# Patient Record
Sex: MALE | Race: ASIAN | NOT HISPANIC OR LATINO | URBAN - METROPOLITAN AREA
[De-identification: names, ages, dates, MRNs, and addresses within clinical notes are randomized per-mention and may not be internally consistent; named-entity substitution may affect disease eponyms.]

---

## 2018-11-01 ENCOUNTER — OUTPATIENT (OUTPATIENT)
Dept: OUTPATIENT SERVICES | Facility: HOSPITAL | Age: 58
LOS: 1 days | End: 2018-11-01
Payer: MEDICAID

## 2018-11-01 PROCEDURE — G9001: CPT

## 2018-11-04 ENCOUNTER — INPATIENT (INPATIENT)
Facility: HOSPITAL | Age: 58
LOS: 1 days | Discharge: HOME | End: 2018-11-06
Attending: INTERNAL MEDICINE | Admitting: INTERNAL MEDICINE
Payer: MEDICAID

## 2018-11-04 VITALS
SYSTOLIC BLOOD PRESSURE: 158 MMHG | DIASTOLIC BLOOD PRESSURE: 63 MMHG | RESPIRATION RATE: 16 BRPM | HEART RATE: 96 BPM | OXYGEN SATURATION: 100 % | TEMPERATURE: 98 F

## 2018-11-04 LAB
ALBUMIN SERPL ELPH-MCNC: 3.8 G/DL — SIGNIFICANT CHANGE UP (ref 3.3–5)
ALP SERPL-CCNC: 128 U/L — HIGH (ref 40–120)
ALT FLD-CCNC: 15 U/L — SIGNIFICANT CHANGE UP (ref 4–41)
APTT BLD: 30.6 SEC — SIGNIFICANT CHANGE UP (ref 27.5–36.3)
AST SERPL-CCNC: 21 U/L — SIGNIFICANT CHANGE UP (ref 4–40)
BASE EXCESS BLDV CALC-SCNC: 0.9 MMOL/L — SIGNIFICANT CHANGE UP
BASOPHILS # BLD AUTO: 0.03 K/UL — SIGNIFICANT CHANGE UP (ref 0–0.2)
BASOPHILS NFR BLD AUTO: 0.3 % — SIGNIFICANT CHANGE UP (ref 0–2)
BILIRUB SERPL-MCNC: 0.6 MG/DL — SIGNIFICANT CHANGE UP (ref 0.2–1.2)
BLOOD GAS VENOUS - CREATININE: 0.65 MG/DL — SIGNIFICANT CHANGE UP (ref 0.5–1.3)
BUN SERPL-MCNC: 19 MG/DL — SIGNIFICANT CHANGE UP (ref 7–23)
CALCIUM SERPL-MCNC: 10.1 MG/DL — SIGNIFICANT CHANGE UP (ref 8.4–10.5)
CHLORIDE BLDV-SCNC: 105 MMOL/L — SIGNIFICANT CHANGE UP (ref 96–108)
CHLORIDE SERPL-SCNC: 101 MMOL/L — SIGNIFICANT CHANGE UP (ref 98–107)
CO2 SERPL-SCNC: 22 MMOL/L — SIGNIFICANT CHANGE UP (ref 22–31)
CREAT SERPL-MCNC: 0.73 MG/DL — SIGNIFICANT CHANGE UP (ref 0.5–1.3)
EOSINOPHIL # BLD AUTO: 0.29 K/UL — SIGNIFICANT CHANGE UP (ref 0–0.5)
EOSINOPHIL NFR BLD AUTO: 2.9 % — SIGNIFICANT CHANGE UP (ref 0–6)
GAS PNL BLDV: 135 MMOL/L — LOW (ref 136–146)
GLUCOSE BLDV-MCNC: 108 — HIGH (ref 70–99)
GLUCOSE SERPL-MCNC: 110 MG/DL — HIGH (ref 70–99)
HCO3 BLDV-SCNC: 24 MMOL/L — SIGNIFICANT CHANGE UP (ref 20–27)
HCT VFR BLD CALC: 37.4 % — LOW (ref 39–50)
HCT VFR BLDV CALC: 36.8 % — LOW (ref 39–51)
HGB BLD-MCNC: 11.9 G/DL — LOW (ref 13–17)
HGB BLDV-MCNC: 12 G/DL — LOW (ref 13–17)
IMM GRANULOCYTES # BLD AUTO: 0.04 # — SIGNIFICANT CHANGE UP
IMM GRANULOCYTES NFR BLD AUTO: 0.4 % — SIGNIFICANT CHANGE UP (ref 0–1.5)
INR BLD: 1.42 — HIGH (ref 0.88–1.17)
LACTATE BLDV-MCNC: 2.6 MMOL/L — HIGH (ref 0.5–2)
LYMPHOCYTES # BLD AUTO: 2.04 K/UL — SIGNIFICANT CHANGE UP (ref 1–3.3)
LYMPHOCYTES # BLD AUTO: 20.2 % — SIGNIFICANT CHANGE UP (ref 13–44)
MCHC RBC-ENTMCNC: 27.3 PG — SIGNIFICANT CHANGE UP (ref 27–34)
MCHC RBC-ENTMCNC: 31.8 % — LOW (ref 32–36)
MCV RBC AUTO: 85.8 FL — SIGNIFICANT CHANGE UP (ref 80–100)
MONOCYTES # BLD AUTO: 0.83 K/UL — SIGNIFICANT CHANGE UP (ref 0–0.9)
MONOCYTES NFR BLD AUTO: 8.2 % — SIGNIFICANT CHANGE UP (ref 2–14)
NEUTROPHILS # BLD AUTO: 6.88 K/UL — SIGNIFICANT CHANGE UP (ref 1.8–7.4)
NEUTROPHILS NFR BLD AUTO: 68 % — SIGNIFICANT CHANGE UP (ref 43–77)
NRBC # FLD: 0 — SIGNIFICANT CHANGE UP
PCO2 BLDV: 39 MMHG — LOW (ref 41–51)
PH BLDV: 7.42 PH — SIGNIFICANT CHANGE UP (ref 7.32–7.43)
PLATELET # BLD AUTO: 189 K/UL — SIGNIFICANT CHANGE UP (ref 150–400)
PMV BLD: 10.1 FL — SIGNIFICANT CHANGE UP (ref 7–13)
PO2 BLDV: 22 MMHG — LOW (ref 35–40)
POTASSIUM BLDV-SCNC: 3.8 MMOL/L — SIGNIFICANT CHANGE UP (ref 3.4–4.5)
POTASSIUM SERPL-MCNC: 4.1 MMOL/L — SIGNIFICANT CHANGE UP (ref 3.5–5.3)
POTASSIUM SERPL-SCNC: 4.1 MMOL/L — SIGNIFICANT CHANGE UP (ref 3.5–5.3)
PROT SERPL-MCNC: 8.6 G/DL — HIGH (ref 6–8.3)
PROTHROM AB SERPL-ACNC: 15.9 SEC — HIGH (ref 9.8–13.1)
RBC # BLD: 4.36 M/UL — SIGNIFICANT CHANGE UP (ref 4.2–5.8)
RBC # FLD: 13.5 % — SIGNIFICANT CHANGE UP (ref 10.3–14.5)
SAO2 % BLDV: 29.4 % — LOW (ref 60–85)
SODIUM SERPL-SCNC: 137 MMOL/L — SIGNIFICANT CHANGE UP (ref 135–145)
TROPONIN T, HIGH SENSITIVITY: 7 NG/L — SIGNIFICANT CHANGE UP (ref ?–14)
TROPONIN T, HIGH SENSITIVITY: 8 NG/L — SIGNIFICANT CHANGE UP (ref ?–14)
TSH SERPL-MCNC: < 0.1 UIU/ML — LOW (ref 0.27–4.2)
WBC # BLD: 10.11 K/UL — SIGNIFICANT CHANGE UP (ref 3.8–10.5)
WBC # FLD AUTO: 10.11 K/UL — SIGNIFICANT CHANGE UP (ref 3.8–10.5)

## 2018-11-04 PROCEDURE — 70486 CT MAXILLOFACIAL W/O DYE: CPT | Mod: 26

## 2018-11-04 PROCEDURE — 70450 CT HEAD/BRAIN W/O DYE: CPT | Mod: 26

## 2018-11-04 PROCEDURE — 71046 X-RAY EXAM CHEST 2 VIEWS: CPT | Mod: 26

## 2018-11-04 PROCEDURE — 72170 X-RAY EXAM OF PELVIS: CPT | Mod: 26

## 2018-11-04 RX ORDER — KETOROLAC TROMETHAMINE 30 MG/ML
15 SYRINGE (ML) INJECTION ONCE
Qty: 0 | Refills: 0 | Status: DISCONTINUED | OUTPATIENT
Start: 2018-11-04 | End: 2018-11-04

## 2018-11-04 RX ORDER — SODIUM CHLORIDE 9 MG/ML
1000 INJECTION INTRAMUSCULAR; INTRAVENOUS; SUBCUTANEOUS ONCE
Qty: 0 | Refills: 0 | Status: COMPLETED | OUTPATIENT
Start: 2018-11-04 | End: 2018-11-04

## 2018-11-04 RX ADMIN — Medication 15 MILLIGRAM(S): at 23:24

## 2018-11-04 RX ADMIN — SODIUM CHLORIDE 1000 MILLILITER(S): 9 INJECTION INTRAMUSCULAR; INTRAVENOUS; SUBCUTANEOUS at 23:24

## 2018-11-04 RX ADMIN — Medication 15 MILLIGRAM(S): at 23:45

## 2018-11-04 NOTE — ED PROVIDER NOTE - OBJECTIVE STATEMENT
[age] yo M from [place] arrived with mechanical fall after tripping on curb.  Pt noted to hit face on ground with ? LOC according to daughter who witnessed the fall.  Patient was able to get up noted to sustain laceration on the lip and abrasion on the nose.  Pt noted to has short term memory loss, with repetitive questioning of the same questions.  Denies nausea, vomiting, chest pain, abdominal pain, knee or leg pain.    Of note patient has been having increasing weakness and lethargy over the past few weeks.  Was brought here by children for medical eval.  Has undergone multiple evaluations by neurology as eval for possible Parkinson's which has been ruled out.  Pt also noted to have ?anemia of unknown etiology.

## 2018-11-04 NOTE — ED ADULT NURSE NOTE - CHIEF COMPLAINT QUOTE
pt here for a mechanical fall and change in mental status. daughter was with pt when he fell. states fall was mechanical, questionable loc, not on any a/c. pt does not remember the fall, has short term memory loss, pt A&O2-3 but repeating the same phrases over and over in triage. has abrasion to nose and small lac to forehead. fs 106

## 2018-11-04 NOTE — ED ADULT TRIAGE NOTE - CHIEF COMPLAINT QUOTE
pt here for a mechanical fall and change in mental status. daughter was with pt when he fell. states fall was mechanical, questionable loc, not on any a/c. pt does not remember the fall, has short term memory loss, pt A&O2-3 but repeating the same phrases over and over in triage. fs 106 pt here for a mechanical fall and change in mental status. daughter was with pt when he fell. states fall was mechanical, questionable loc, not on any a/c. pt does not remember the fall, has short term memory loss, pt A&O2-3 but repeating the same phrases over and over in triage. has abrasion to nose and small lac to forehead. fs 106

## 2018-11-04 NOTE — ED PROVIDER NOTE - CARE PLAN
Principal Discharge DX:	Hyperthyroidism  Secondary Diagnosis:	Concussion without loss of consciousness, initial encounter

## 2018-11-04 NOTE — ED PROVIDER NOTE - MEDICAL DECISION MAKING DETAILS
Gong: s/p mechanical fall on no anticoagulants concern for possible sdh vs sah, vs concussion, given amnesia, will check labs, ct head and max facial, chest xray and pelvic xray  - re-eval  Discussed plan with family

## 2018-11-04 NOTE — ED ADULT NURSE NOTE - OBJECTIVE STATEMENT
pt on bed aox2, baseline aox3 per relatives, reports witnessed fall, claimed tumbled into wired fence and landed on his face, unknown LOC, noted abrasion, swelling on the nose,  lips, dry blood on the face and nasal nares, Pt unaware of the fall. noted 4 extremity unpurposeful tremors, repetitively asking if he fall, denies taking blood thinners on cm sinus rhythm PMHx Sleep Apnea Asthma, Parkinsons??, MD at bedside eval the pt, will monitor

## 2018-11-05 DIAGNOSIS — Z29.9 ENCOUNTER FOR PROPHYLACTIC MEASURES, UNSPECIFIED: ICD-10-CM

## 2018-11-05 DIAGNOSIS — E05.90 THYROTOXICOSIS, UNSPECIFIED WITHOUT THYROTOXIC CRISIS OR STORM: ICD-10-CM

## 2018-11-05 DIAGNOSIS — S06.0X9A CONCUSSION WITH LOSS OF CONSCIOUSNESS OF UNSPECIFIED DURATION, INITIAL ENCOUNTER: ICD-10-CM

## 2018-11-05 DIAGNOSIS — S02.2XXA FRACTURE OF NASAL BONES, INITIAL ENCOUNTER FOR CLOSED FRACTURE: ICD-10-CM

## 2018-11-05 DIAGNOSIS — I10 ESSENTIAL (PRIMARY) HYPERTENSION: ICD-10-CM

## 2018-11-05 DIAGNOSIS — K21.9 GASTRO-ESOPHAGEAL REFLUX DISEASE WITHOUT ESOPHAGITIS: ICD-10-CM
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
## 2018-11-05 DIAGNOSIS — R53.83 OTHER FATIGUE: ICD-10-CM

## 2018-11-05 DIAGNOSIS — J45.909 UNSPECIFIED ASTHMA, UNCOMPLICATED: ICD-10-CM

## 2018-11-05 DIAGNOSIS — D64.9 ANEMIA, UNSPECIFIED: ICD-10-CM

## 2018-11-05 DIAGNOSIS — Z90.49 ACQUIRED ABSENCE OF OTHER SPECIFIED PARTS OF DIGESTIVE TRACT: Chronic | ICD-10-CM

## 2018-11-05 LAB
APPEARANCE UR: SIGNIFICANT CHANGE UP
BACTERIA # UR AUTO: NEGATIVE — SIGNIFICANT CHANGE UP
BASE EXCESS BLDV CALC-SCNC: -0.5 MMOL/L — SIGNIFICANT CHANGE UP
BILIRUB UR-MCNC: NEGATIVE — SIGNIFICANT CHANGE UP
BLOOD GAS VENOUS - CREATININE: 0.56 MG/DL — SIGNIFICANT CHANGE UP (ref 0.5–1.3)
BLOOD UR QL VISUAL: NEGATIVE — SIGNIFICANT CHANGE UP
BUN SERPL-MCNC: 19 MG/DL — SIGNIFICANT CHANGE UP (ref 7–23)
CALCIUM SERPL-MCNC: 9.7 MG/DL — SIGNIFICANT CHANGE UP (ref 8.4–10.5)
CHLORIDE BLDV-SCNC: 110 MMOL/L — HIGH (ref 96–108)
CHLORIDE SERPL-SCNC: 104 MMOL/L — SIGNIFICANT CHANGE UP (ref 98–107)
CO2 SERPL-SCNC: 23 MMOL/L — SIGNIFICANT CHANGE UP (ref 22–31)
COLOR SPEC: YELLOW — SIGNIFICANT CHANGE UP
CREAT SERPL-MCNC: 0.67 MG/DL — SIGNIFICANT CHANGE UP (ref 0.5–1.3)
FERRITIN SERPL-MCNC: 515.5 NG/ML — HIGH (ref 30–400)
GAS PNL BLDV: 135 MMOL/L — LOW (ref 136–146)
GLUCOSE BLDV-MCNC: 98 — SIGNIFICANT CHANGE UP (ref 70–99)
GLUCOSE SERPL-MCNC: 97 MG/DL — SIGNIFICANT CHANGE UP (ref 70–99)
GLUCOSE UR-MCNC: NEGATIVE — SIGNIFICANT CHANGE UP
HBA1C BLD-MCNC: 5 % — SIGNIFICANT CHANGE UP (ref 4–5.6)
HCO3 BLDV-SCNC: 24 MMOL/L — SIGNIFICANT CHANGE UP (ref 20–27)
HCT VFR BLDV CALC: 31.5 % — LOW (ref 39–51)
HGB BLDV-MCNC: 10.2 G/DL — LOW (ref 13–17)
HYALINE CASTS # UR AUTO: SIGNIFICANT CHANGE UP
IRON SATN MFR SERPL: 195 UG/DL — SIGNIFICANT CHANGE UP (ref 155–535)
IRON SATN MFR SERPL: 47 UG/DL — SIGNIFICANT CHANGE UP (ref 45–165)
KETONES UR-MCNC: NEGATIVE — SIGNIFICANT CHANGE UP
LACTATE BLDV-MCNC: 1.4 MMOL/L — SIGNIFICANT CHANGE UP (ref 0.5–2)
LEUKOCYTE ESTERASE UR-ACNC: SIGNIFICANT CHANGE UP
NITRITE UR-MCNC: NEGATIVE — SIGNIFICANT CHANGE UP
PCO2 BLDV: 36 MMHG — LOW (ref 41–51)
PH BLDV: 7.43 PH — SIGNIFICANT CHANGE UP (ref 7.32–7.43)
PH UR: 6 — SIGNIFICANT CHANGE UP (ref 5–8)
PO2 BLDV: 49 MMHG — HIGH (ref 35–40)
POTASSIUM BLDV-SCNC: 3.7 MMOL/L — SIGNIFICANT CHANGE UP (ref 3.4–4.5)
POTASSIUM SERPL-MCNC: 4.2 MMOL/L — SIGNIFICANT CHANGE UP (ref 3.5–5.3)
POTASSIUM SERPL-SCNC: 4.2 MMOL/L — SIGNIFICANT CHANGE UP (ref 3.5–5.3)
PROT UR-MCNC: 20 — SIGNIFICANT CHANGE UP
RBC CASTS # UR COMP ASSIST: HIGH (ref 0–?)
SAO2 % BLDV: 82.1 % — SIGNIFICANT CHANGE UP (ref 60–85)
SODIUM SERPL-SCNC: 139 MMOL/L — SIGNIFICANT CHANGE UP (ref 135–145)
SP GR SPEC: 1.02 — SIGNIFICANT CHANGE UP (ref 1–1.04)
SQUAMOUS # UR AUTO: SIGNIFICANT CHANGE UP
T3 SERPL-MCNC: 413.5 NG/DL — HIGH (ref 80–200)
T4 AB SER-ACNC: 22.66 UG/DL — HIGH (ref 5.1–13)
T4 FREE SERPL-MCNC: 5.57 NG/DL — HIGH (ref 0.9–1.8)
UIBC SERPL-MCNC: 147.5 UG/DL — SIGNIFICANT CHANGE UP (ref 110–370)
UROBILINOGEN FLD QL: NORMAL — SIGNIFICANT CHANGE UP
WBC UR QL: HIGH (ref 0–?)

## 2018-11-05 PROCEDURE — 99222 1ST HOSP IP/OBS MODERATE 55: CPT | Mod: GC

## 2018-11-05 PROCEDURE — 70450 CT HEAD/BRAIN W/O DYE: CPT | Mod: 26

## 2018-11-05 PROCEDURE — 99223 1ST HOSP IP/OBS HIGH 75: CPT | Mod: GC

## 2018-11-05 RX ORDER — LISINOPRIL 2.5 MG/1
20 TABLET ORAL DAILY
Qty: 0 | Refills: 0 | Status: DISCONTINUED | OUTPATIENT
Start: 2018-11-05 | End: 2018-11-06

## 2018-11-05 RX ORDER — FOLIC ACID 0.8 MG
1 TABLET ORAL DAILY
Qty: 0 | Refills: 0 | Status: DISCONTINUED | OUTPATIENT
Start: 2018-11-05 | End: 2018-11-06

## 2018-11-05 RX ORDER — ALBUTEROL 90 UG/1
1 AEROSOL, METERED ORAL EVERY 4 HOURS
Qty: 0 | Refills: 0 | Status: DISCONTINUED | OUTPATIENT
Start: 2018-11-05 | End: 2018-11-06

## 2018-11-05 RX ORDER — HEPARIN SODIUM 5000 [USP'U]/ML
5000 INJECTION INTRAVENOUS; SUBCUTANEOUS EVERY 8 HOURS
Qty: 0 | Refills: 0 | Status: DISCONTINUED | OUTPATIENT
Start: 2018-11-05 | End: 2018-11-06

## 2018-11-05 RX ORDER — PANTOPRAZOLE SODIUM 20 MG/1
40 TABLET, DELAYED RELEASE ORAL
Qty: 0 | Refills: 0 | Status: DISCONTINUED | OUTPATIENT
Start: 2018-11-05 | End: 2018-11-06

## 2018-11-05 RX ORDER — ATENOLOL 25 MG/1
25 TABLET ORAL
Qty: 0 | Refills: 0 | Status: DISCONTINUED | OUTPATIENT
Start: 2018-11-05 | End: 2018-11-05

## 2018-11-05 RX ORDER — ACETAMINOPHEN 500 MG
650 TABLET ORAL EVERY 6 HOURS
Qty: 0 | Refills: 0 | Status: DISCONTINUED | OUTPATIENT
Start: 2018-11-05 | End: 2018-11-06

## 2018-11-05 RX ORDER — ATENOLOL 25 MG/1
50 TABLET ORAL DAILY
Qty: 0 | Refills: 0 | Status: DISCONTINUED | OUTPATIENT
Start: 2018-11-06 | End: 2018-11-06

## 2018-11-05 RX ORDER — BUDESONIDE AND FORMOTEROL FUMARATE DIHYDRATE 160; 4.5 UG/1; UG/1
2 AEROSOL RESPIRATORY (INHALATION)
Qty: 0 | Refills: 0 | Status: DISCONTINUED | OUTPATIENT
Start: 2018-11-05 | End: 2018-11-06

## 2018-11-05 RX ADMIN — Medication 650 MILLIGRAM(S): at 14:10

## 2018-11-05 RX ADMIN — HEPARIN SODIUM 5000 UNIT(S): 5000 INJECTION INTRAVENOUS; SUBCUTANEOUS at 06:43

## 2018-11-05 RX ADMIN — PANTOPRAZOLE SODIUM 40 MILLIGRAM(S): 20 TABLET, DELAYED RELEASE ORAL at 10:02

## 2018-11-05 RX ADMIN — LISINOPRIL 20 MILLIGRAM(S): 2.5 TABLET ORAL at 06:44

## 2018-11-05 RX ADMIN — BUDESONIDE AND FORMOTEROL FUMARATE DIHYDRATE 2 PUFF(S): 160; 4.5 AEROSOL RESPIRATORY (INHALATION) at 12:10

## 2018-11-05 RX ADMIN — HEPARIN SODIUM 5000 UNIT(S): 5000 INJECTION INTRAVENOUS; SUBCUTANEOUS at 14:11

## 2018-11-05 RX ADMIN — Medication 650 MILLIGRAM(S): at 14:50

## 2018-11-05 RX ADMIN — ATENOLOL 25 MILLIGRAM(S): 25 TABLET ORAL at 06:44

## 2018-11-05 RX ADMIN — Medication 1 MILLIGRAM(S): at 12:10

## 2018-11-05 NOTE — CONSULT NOTE ADULT - ATTENDING COMMENTS
Patient seen and examined with neurology team and above note reviewed and edited and I agree with assessment and plan as outlined. Patient presents after a fall and he cannot recall the events that led to the fall. He just remembers that his daughter and son in law took him to the hospital ER. He reports that over the past several months he has been feeling more tremulous and having difficulty with balance and gait. He was concerned that he may be developing Parkinson's and went to be evaluated by a neurologist and had a lot f testing done which we donot have or have access to. He has been having decreased appetite and weight loss the past year.   Exam shows that he has no signs of resting tremor, bradykinesia or rigidity or cogwheeling. He has no focal motor or sensory deficits. He does have a left> right postural tremor of his arms and hands. CT head showed no acute findings.  Post concussive amnesia vs structural lesion vs seizure. Will proceed with prolonged EEG and MRI brain without contrast.  Please obtain orthostatic TON, and send B12, folate, thiamine and RPR, TSH.   PT evaluation and continue medical management and supportive care. All questions answered.
58M with recently diagnosed hyperthyroidism (about 2 weeks ago by pcp) - he was sent for a "nuclear medicine scan" which turns out to be a sestamibi scan (parathyroid scan) which was normal. He did not have an uptake and scan. He was on atenolol prior to admission but states this is not a new medication. Patient with significant weight loss and tremors. Check thyroid antibodies, thyroid ultrasound. Would hold off on nuclear scan for now. Start methimazole 30mg daily, atenolol 50mg daily.

## 2018-11-05 NOTE — CONSULT NOTE ADULT - ASSESSMENT
58 M PMH of Asthma, HTN, ?hyperthyroidism, sleep apnea, GERD presented to ED s/p witness fall s/p mechanical fall vs syncope. Pt was also c/p fatigue, hands tremor, anxiety/ depression, wt loss 70lbs (From 270lbs to 200lbs since 11/2017) with loss of appetite.Poly hx of hyperthyroidism(mother and brother ). Pt denies palpitation, heat intolerance, hyperdefecation , urinary frequency. 58 M PMH of Asthma, HTN, ?hyperthyroidism, sleep apnea, GERD presented to ED s/p witness fall s/p mechanical fall vs syncope. Pt was also c/p fatigue, hands tremor, anxiety/ depression, wt loss 70lbs (From 270lbs to 200lbs since 11/2017) with loss of appetite. Positive family hx of hyperthyroidism (mother and brother ). Pt denies palpitation, heat intolerance, hyperdefecation , urinary frequency. Pt visited his PCP DR. Mcclellan ([phone] office ) and was sent nuclear thyroids scan 2 wks ago, unknown test report. 58 M PMH of Asthma, HTN, ?hyperthyroidism, sleep apnea, GERD presented to ED s/p witness fall s/p mechanical fall vs syncope. Pt also c/O fatigue, hands tremor, anxiety/ depression, wt loss 70lbs (From 270lbs to 200lbs since 11/2017) with loss of appetite. Positive family hx of hyperthyroidism (mother and brother ). Pt denies palpitation, heat intolerance, hyperdefecation , urinary frequency. Pt visited his PCP DR. Mcclellan ([phone] office ) and was sent to nuclear thyroid scan 2 wks ago, unknown test report. 58 M PMH of Asthma, HTN, ?hyperthyroidism, sleep apnea, GERD presented to ED s/p witness fall s/p mechanical fall vs syncope. Pt also c/O fatigue, hands tremor, anxiety/ depression, wt loss 70lbs (From 270lbs to 200lbs since 11/2017) with loss of appetite. Positive family hx of hyperthyroidism (mother and brother ). Pt denies palpitation, heat intolerance, radiation exposure during childhood,  hyperdefecation , urinary frequency.     Pt visited his PCP DR. Mcclellan ([phone] office ) and was sent to nuclear thyroid scan 2 wks ago, unknown test report. Called Dr. Mcclellan office, left message for getting report, primary care team please follow up. 58 M PMH of Asthma, HTN, ?hyperthyroidism, sleep apnea, GERD presented to ED s/p witness fall s/p mechanical fall vs syncope. Pt also c/o fatigue, hands tremor, sob, dyspnea on exertion, anxiety/ depression, wt loss 70lbs (From 270lbs to 200lbs since 11/2017) with loss of appetite. Positive family hx of hyperthyroidism (mother and brother ). Pt denies palpitation, heat intolerance, radiation exposure during childhood,  hyperdefecation , urinary frequency.     Pt visited his PCP DR. Mcclellan ([phone] office ) and was sent to nuclear thyroid scan 2 wks ago, unknown test report. Called Dr. Mcclellan office, left message for getting report, primary care team please follow up. 58 M PMH of Asthma, HTN, ?hyperthyroidism, sleep apnea, GERD presented to ED s/p witness fall s/p mechanical fall vs syncope. Pt also c/o fatigue, hands tremor, sob, dyspnea on exertion, anxiety/ depression, wt loss 70lbs (From 270lbs to 200lbs since 11/2017) with loss of appetite. Positive family hx of hyperthyroidism (mother and brother ). Pt denies palpitation, heat intolerance, radiation exposure during childhood,  hyperdefecation , urinary frequency.     Pt visited his PCP DR. Mcclellan ([phone] office ) and was sent to nuclear scan 2 wks ago. Pt has report noted for parathyroids scan on 10/17/18, result noted normal. 58 M PMH of Asthma, HTN, hyperthyroidism, sleep apnea, GERD presented to ED s/p witness fall s/p mechanical fall vs syncope. Pt also c/o fatigue, hands tremor, sob, dyspnea on exertion, anxiety/ depression, wt loss 70lbs (From 270lbs to 200lbs since 11/2017) with loss of appetite. Positive family hx of hyperthyroidism (mother and brother ). Pt denies palpitation, heat intolerance, radiation exposure during childhood,  hyperdefecation , urinary frequency.     Pt visited his PCP DR. Mcclellan ([phone] office ) and was sent to nuclear scan 2 wks ago. Pt has report noted for parathyroids scan on 10/17/18, result noted normal.

## 2018-11-05 NOTE — H&P ADULT - HISTORY OF PRESENT ILLNESS
History obtained from daughter and patient.  58M with PMhx of HTN, asthma, ?hyperthyroid and sleep apnea presents to the ED after a witness fall. Patient was walking on the sidewalk, and daughter is unsure if he tripped over uneven pavement or passed out. He was on the ground for 1 minute before he was helped up by his family. After the fall, patient could not remember how he got down there, and was disoriented to where he was. He repeatedly asked his daughter what happened and could not recall the events of the day. Patient also stated to his daughter he thought he broke is nose.    After coming the the emergency room, daughter states patient continued to ask the same questions over and over again, how he got to the hospital and what is going on, and after answering the questions, daughter states he would ask again. After she left the ED, he called her 3 times in 30 mins to ask where she went and if he took her wallet.    Daughter states her dad has been very sick for the past few months. He was living in [place] alone, until 4 months ago where he came to the [place]. She reports he has been complaining about fatigue and lethargy, and also rendorse getting tired and short of breath on exertion. [place], he has been evaluated by multiple doctors who have not come to the conclusion of what is going on. She says he even becomes shaky sometimes and was evaluated for parkinson's which was ruled out. Recently, his primary care stated he may have some problem with the thyroid, but no medications were started. Daughter states her brother also has hyperthyroid but the does not know the etiology. She also thinks patient may be depressed. Unclear at this time if he has had any cardiac workup. History obtained from daughter and patient.  58M with PMhx of HTN, asthma, ?hyperthyroid and sleep apnea presents to the ED after a witness fall. Patient was walking on the sidewalk, and daughter is unsure if he tripped over uneven pavement or passed out. He was on the ground for 1 minute before he was helped up by his family. After the fall, patient could not remember how he got down there, and was disoriented to where he was. He repeatedly asked his daughter what happened and could not recall the events of the day. Patient also stated to his daughter he thought he broke is nose.    After coming the the emergency room, daughter states patient continued to ask the same questions over and over again, how he got to the hospital and what is going on, and after answering the questions, daughter states he would ask again. After she left the ED, he called her 3 times in 30 mins to ask where she went and if he took her wallet.    Daughter states her dad has been very sick for the past few months. He was living in [place] alone, until 4 months ago where he came to the [place]. She reports he has been complaining about fatigue and lethargy, and also rendorse getting tired and short of breath on exertion. [place], he has been evaluated by multiple doctors who have not come to the conclusion of what is going on. She says he even becomes shaky sometimes and was evaluated for parkinson's which was ruled out. Recently, his primary care stated he may have some problem with the thyroid, but no medications were started. Daughter states her brother also has hyperthyroid but the does not know the etiology. She also thinks patient may be depressed. Unclear at this time if he has had any cardiac workup.    During evaluation by me, patient unclear how he fell and does not remember the events of the day. He continues to ask repetitive question.    In the ED, VS, 158/62; HR 96; RR16; T 98.4, O2 100% on room air. Patient underwent CT head which showed left nasal fracture deformity. History obtained from daughter and patient.  58M with PMhx of HTN, asthma, ?hyperthyroid and sleep apnea presents to the ED after a witness fall. Patient was walking on the sidewalk, and daughter is unsure if he tripped over uneven pavement or passed out. He was on the ground for 1 minute before he was helped up by his family. After the fall, patient could not remember how he got down there, and was disoriented to where he was. He repeatedly asked his daughter what happened and could not recall the events of the day. Patient also stated to his daughter he thought he broke is nose.    After coming the the emergency room, daughter states patient continued to ask the same questions over and over again, how he got to the hospital and what is going on, and after answering the questions, daughter states he would ask again. After she left the ED, he called her 3 times in 30 mins to ask where she went and if he took her wallet.    Daughter states her dad has been very sick for the past few months. He was living in [place] alone, until 4 months ago where he came to the [place]. She reports he has been complaining about fatigue and lethargy, and also rendorse getting tired and short of breath on exertion. [place] to [place], he has been evaluated by multiple doctors who have not come to the conclusion of what is going on. She says he even becomes shaky sometimes and was evaluated for parkinson's which was ruled out. Recently, his primary care stated he may have some problem with the thyroid, but no medications were started. Daughter states her brother also has hyperthyroid but the does not know the etiology. She also thinks patient may be depressed. Unclear at this time if he has had any cardiac workup.    During evaluation by me, patient unclear how he fell and does not remember the events of the day. He continues to ask repetitive questions. Patient showed a list of medications to provider, and 5 minutes later asked if I would like to see a list of his medications again. In addition, levofloxacin noted on the list, and patient states he was given it because he was having some difficulty breathing    In the ED, VS, 158/62; HR 96; RR16; T 98.4, O2 100% on room air. Patient underwent CT head which showed left nasal fracture deformity.

## 2018-11-05 NOTE — H&P ADULT - PROBLEM SELECTOR PLAN 3
- patient with TSH of .01 and elevated T3 and T4 (pending free T4)  - not on medications for thyroid, but was told he has borederline high thyroid  - will obtain free T4, TPO ab, Tg ab and TSI  - patient will require radionucleotide uptake scan  - unclear if graves vs hot nodule; no pretibial edema or proptosis on exam  - Schulte-Wartofsky Point Scale (BWPS) for Thyrotoxicosis score = 15- unlikely to be in thyroid storm   - endocrine consult in AM

## 2018-11-05 NOTE — H&P ADULT - PROBLEM SELECTOR PLAN 2
- unclear if fall was mechanical; pt with downtime on the ground as well  - will observe on tele to evaluate for arrythmia as unclear if patient with LOC and given uncontrolled hyperthyroidism, at risk for arrythmia  - EKG shows sinus tachycardia

## 2018-11-05 NOTE — CONSULT NOTE ADULT - ASSESSMENT
Patient is a 58 year old male, recently emigrated from [place], with PMHx of hyperthyroidism who was BIBEMS after mechanical fall. Patient does not remember incident, history obtained from daughter at bedside. Per daughter he was walking, tripped on sidewalk and fell face first, unknown if patient had LOC, was able to get up after short period of time, with abrasion on nose and lac on lip. He is not on any anticoagulation, and is currently being worked up for anemia of unknown etiology. He has short term memory loss, cannot remember details today from before or after the fall, repeatedly asking the same questions. Per family patient has also had increased weakness and lethargy for the past few weeks. No history of falls or instability.   CT head and facial were negative.   Neurologic exam notable to anterograde and retrograde amnesia, which per daughter is new and began right after the fall.  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 Patient does not remember incident, history obtained from daughter at bedside. Per daughter he was walking, tripped on sidewalk and fell face first, unknown if patient had LOC, was able to get up after short period of time, with abrasion on nose and lac on lip. He is not on any anticoagulation, and is currently being worked up for anemia of unknown etiology. He has short term memory loss, cannot remember details today from before or after the fall, repeatedly asking the same questions. Per family patient has also had increased weakness and lethargy for the past few weeks. No history of falls or instability.   CT head and facial were negative.   Neurologic exam notable to anterograde and retrograde amnesia, which per daughter is new and began right after the fall.  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

## 2018-11-05 NOTE — H&P ADULT - NSHPLABSRESULTS_GEN_ALL_CORE
11.9   10.11 )-----------( 189      ( 04 Nov 2018 20:50 )             37.4       11-04    137  |  101  |  19  ----------------------------<  110<H>  4.1   |  22  |  0.73    Ca    10.1      04 Nov 2018 20:50    TPro  8.6<H>  /  Alb  3.8  /  TBili  0.6  /  DBili  x   /  AST  21  /  ALT  15  /  AlkPhos  128<H>  11-04      PT/INR - ( 04 Nov 2018 20:50 )   PT: 15.9 SEC;   INR: 1.42          PTT - ( 04 Nov 2018 20:50 )  PTT:30.6 SEC          00:15 - VBG - pH: 7.43  | pCO2: 36    | pO2: 49    | Lactate: 1.4    20:50 - VBG - pH: 7.42  | pCO2: 39    | pO2: 22    | Lactate: 2.6            EKG:  sinus tachycardia    < from: CT Head No Cont (11.04.18 @ 22:56) >    MPRESSION:     CT BRAIN: No acute intracranial bleeding, mass effect, or shift.     CT FACE: Age-indeterminate left nasal fracture deformity. Correlate with   exam.

## 2018-11-05 NOTE — H&P ADULT - NSHPPHYSICALEXAM_GEN_ALL_CORE
General: appears stated age, laying in bed NAD  Neurology: A&Ox2, 5/5 strength in upper and lower extremities, CN II- XII in tact  Head:  Normocephalic, bruising over nasal bone and upper lip; left upper lip with swelling  ENT:  Mucosa moist, no ulcerations  Neck:  Supple, palpable thyroid- mildly enlarged  Lymphatic:  No palpable cervical, supraclavicular, axillary or inguinal adenopathy  Respiratory: clear to auscultation bilaterally  CV: RRR, S1S2, no murmurs or rubs  Abdominal: Soft, NT, ND no palpable mass  MSK: No edema, + peripheral pulses T(C): 36.7 (11-05-18 @ 06:10), Max: 37.8 (11-04-18 @ 21:24)  HR: 98 (11-05-18 @ 06:08) (96 - 102)  BP: 147/85 (11-05-18 @ 06:08) (147/85 - 158/63)  RR: 17 (11-05-18 @ 06:08) (16 - 18)  SpO2: 100% (11-05-18 @ 06:08) (99% - 100%)    General: appears stated age, laying in bed NAD  Neurology: A&Ox2, 5/5 strength in upper and lower extremities, CN II- XII in tact  Head:  Normocephalic, bruising over nasal bone and upper lip; left upper lip with swelling  ENT:  Mucosa moist, no ulcerations  Neck:  Supple, palpable thyroid- mildly enlarged  Lymphatic:  No palpable cervical, supraclavicular, axillary or inguinal adenopathy  Respiratory: clear to auscultation bilaterally  CV: RRR, S1S2, no murmurs or rubs  Abdominal: Soft, NT, ND no palpable mass  MSK: No edema, + peripheral pulses

## 2018-11-05 NOTE — H&P ADULT - PROBLEM SELECTOR PLAN 5
- patient fatigue and lethargy and generally feeling weak for months  - ddx includes untreated hyperthyroid vs depression vs sleep apnea vs other etiologies including malignancy or cardiac disease  - clarify outpatient workup with PMD in the AM as he has been worked up by a number of doctors

## 2018-11-05 NOTE — CONSULT NOTE ADULT - SUBJECTIVE AND OBJECTIVE BOX
Reason For Consult: Hyperthyroidism    HPI:  History obtained from daughter and patient.  58M with PMhx of HTN, asthma, ?hyperthyroid and sleep apnea presents to the ED after a witness fall. Patient was walking on the sidewalk, and daughter is unsure if he tripped over uneven pavement or passed out. He was on the ground for 1 minute before he was helped up by his family. After the fall, patient could not remember how he got down there, and was disoriented to where he was. He repeatedly asked his daughter what happened and could not recall the events of the day. Patient also stated to his daughter he thought he broke is nose.    After coming the the emergency room, daughter states patient continued to ask the same questions over and over again, how he got to the hospital and what is going on, and after answering the questions, daughter states he would ask again. After she left the ED, he called her 3 times in 30 mins to ask where she went and if he took her wallet.    Daughter states her dad has been very sick for the past few months. He was living in [place] alone, until 4 months ago where he came to the [place]. She reports he has been complaining about fatigue and lethargy, and also rendorse getting tired and short of breath on exertion. After coming to [place], he has been evaluated by multiple doctors who have not come to the conclusion of what is going on. She says he even becomes shaky sometimes and was evaluated for parkinson's which was ruled out. Recently, his primary care stated he may have some problem with the thyroid, but no medications were started. Daughter states her brother also has hyperthyroid but the does not know the etiology. She also thinks patient may be depressed. Unclear at this time if he has had any cardiac workup.    During evaluation by me, patient unclear how he fell and does not remember the events of the day. He continues to ask repetitive questions. Patient showed a list of medications to provider, and 5 minutes later asked if I would like to see a list of his medications again. In addition, levofloxacin noted on the list, and patient states he was given it because he was having some difficulty breathing    In the ED, VS, 158/62; HR 96; RR16; T 98.4, O2 100% on room air. Patient underwent CT head which showed left nasal fracture deformity. (05 Nov 2018 02:57)    Endocrine History:        PAST MEDICAL & SURGICAL HISTORY:  Sleep apnea  GERD (gastroesophageal reflux disease)  HTN (hypertension)  Asthma  History of appendectomy      FAMILY HISTORY:  Family history of hyperthyroidism (Child)      Social History:    Outpatient Medications:    Home Medications:   * Patient Currently Takes Medications as of 05-Nov-2018 03:12 documented in Structured Notes  · 	folic acid 0.4 mg oral tablet: 1 tab(s) orally once a day  · 	atenolol: 25 milligram(s) orally 2 times a day  · 	lisinopril 20 mg oral tablet: 1 tab(s) orally once a day  · 	Symbicort 80 mcg-4.5 mcg/inh inhalation aerosol: 2 puff(s) inhaled 2 times a day  · 	Ventolin: 1 puff(s) orally every 4 hours  · 	Vitamin D3 10,000 intl units oral capsule: 1 cap(s) orally once a week  · 	levoFLOXacin 750 mg oral tablet: 1 tab(s) orally every 24 hours    MEDICATIONS  (STANDING):  ATENolol  Tablet 25 milliGRAM(s) Oral two times a day  buDESOnide  80 MICROgram(s)/formoterol 4.5 MICROgram(s) Inhaler 2 Puff(s) Inhalation two times a day  folic acid 1 milliGRAM(s) Oral daily  heparin  Injectable 5000 Unit(s) SubCutaneous every 8 hours  lisinopril 20 milliGRAM(s) Oral daily  pantoprazole    Tablet 40 milliGRAM(s) Oral before breakfast    MEDICATIONS  (PRN):  acetaminophen   Tablet .. 650 milliGRAM(s) Oral every 6 hours PRN Mild Pain (1 - 3), Moderate Pain (4 - 6)  ALBUTerol    90 MICROgram(s) HFA Inhaler 1 Puff(s) Inhalation every 4 hours PRN Shortness of Breath and/or Wheezing      Allergies  No Known Allergies    Review of Systems:  Constitutional: No fever  Eyes: No blurry vision  Neuro: No tremors  HEENT: No pain  Cardiovascular: No chest pain, palpitations  Respiratory: No SOB, no cough  GI: No nausea, vomiting, abdominal pain  : No dysuria  Skin: no rash  Psych: no depression  Endocrine: no polyuria, polydipsia  Hem/lymph: no swelling  Osteoporosis: no fractures    ALL OTHER SYSTEMS REVIEWED AND NEGATIVE    PHYSICAL EXAM:  VITALS: T(C): 36.4 (11-05-18 @ 10:05)  T(F): 97.5 (11-05-18 @ 10:05), Max: 100.1 (11-04-18 @ 21:24)  HR: 77 (11-05-18 @ 10:05) (77 - 102)  BP: 120/56 (11-05-18 @ 10:05) (120/56 - 158/63)  RR:  (16 - 22)  SpO2:  (99% - 100%)  Wt(kg): --  GENERAL: NAD, well-groomed, well-developed  EYES: No proptosis, no lid lag, anicteric  HEENT:  Atraumatic, Normocephalic, moist mucous membranes  THYROID: Normal size, no palpable nodules  RESPIRATORY: Clear to auscultation bilaterally; No rales, rhonchi, wheezing, or rubs  CARDIOVASCULAR: Regular rate and rhythm; No murmurs; no peripheral edema  GI: Soft, nontender, non distended, normal bowel sounds  SKIN: Dry, intact, No rashes or lesions  MUSCULOSKELETAL: Full range of motion, normal strength  NEURO: sensation intact, extraocular movements intact, no tremor, normal reflexes  PSYCH: Alert and oriented x 3, normal affect, normal mood  CUSHING'S SIGNS: no striae    POCT Blood Glucose.: 106 mg/dL (11-04-18 @ 19:56)                            11.9   10.11 )-----------( 189      ( 04 Nov 2018 20:50 )             37.4       11-05    139  |  104  |  19  ----------------------------<  97  4.2   |  23  |  0.67    EGFR if : 122  EGFR if non : 105    Ca    9.7      11-05    TPro  8.6<H>  /  Alb  3.8  /  TBili  0.6  /  DBili  x   /  AST  21  /  ALT  15  /  AlkPhos  128<H>  11-04      Thyroid Function Tests:  11-04 @ 20:50   TSH < 0.10   FreeT4 5.57   T3 413.5   Anti TPO -- Anti Thyroglobulin Ab -- TSI --      Hemoglobin A1C, Whole Blood: 5.0 % [4.0 - 5.6] (11-05-18 @ 06:50)

## 2018-11-05 NOTE — H&P ADULT - PROBLEM SELECTOR PLAN 1
- patient s/p fall today hitting head on ground  - currently with anterograde and retrograde amnesia suggestive of post concussive  amnesia  - CTH without evidence of ICH- will repeat in AM per neuro recs  - neuro checks q4

## 2018-11-05 NOTE — OCCUPATIONAL THERAPY INITIAL EVALUATION ADULT - PERTINENT HX OF CURRENT PROBLEM, REHAB EVAL
58M with PMhx of HTN, asthma, ?hyperthyroid and sleep apnea presents to the ED after a witness fall, found to have markedly elevated free t3 and t4, and waxing and waning mental status, admitted for hyperthyroidism and likely post concussive amnesia.

## 2018-11-05 NOTE — H&P ADULT - PROBLEM SELECTOR PLAN 9
- c/w home symbicort  - has also been diagnosed with sleep apnea but does not use CPAP (per daughter- no sleep study was done, clarify with pmd in AM)

## 2018-11-05 NOTE — CONSULT NOTE ADULT - PROBLEM SELECTOR RECOMMENDATION 9
could be toxic adenoma vs toxic multinodular goiter vs Graves disease   TSH <0.1, free T4 5.57, total T3 413  f/u TPO Ab , TRAb, TSH Ig, Thyroglobulin Ab  f/u repeat TSH, free T4, free T3  primary care team please get thyroids scan report form PCP office   will start methimazole 10mg q8hrs could be toxic adenoma vs toxic multinodular goiter vs Graves disease   TSH <0.1, free T4 5.57, total T3 413  f/u TPO Ab , TRAb, TSI, TG Ab  f/u repeat TSH, free T4, free T3  primary care team please get thyroids scan report form PCP office   will start methimazole 10mg q8hrs DDx: toxic adenoma vs toxic multinodular goiter vs Graves disease vs thyroid Ca  TSH <0.1, free T4 5.57, total T3 413  f/u TPO Ab , TRAb, TSI, TG Ab  f/u repeat TSH, free T4, free T3  primary care team please get thyroids scan report form PCP office   will start methimazole 10mg q8hrs DDx: toxic adenoma vs toxic multinodular goiter vs Graves disease   TSH <0.1, free T4 5.57, total T3 413  f/u TPO Ab , TRAb, TSI, TG Ab  f/u repeat TSH, free T4, free T3  primary care team please get thyroids scan report form PCP office   will start methimazole 10mg q8hrs most likely Graves disease, could be toxic adenoma or toxic multinodular goiter  TSH <0.1, free T4 5.57, total T3 413  f/u TPO Ab , TRAb, TSI, TG Ab  f/u PTH level   f/u repeat TSH, free T4, free T3  started methimazole 30mg daily   changed atenolol to 50mg daily   f/u US thyroid  pt will need nuclear thyroid scan, can f/u at outpatient most likely Graves disease, could be toxic adenoma or toxic multinodular goiter  TSH <0.1, free T4 5.57, total T3 413  f/u TPO Ab , TRAb, TSI, TG Ab  f/u PTH level   f/u repeat TSH, free T4, free T3  started methimazole 30mg daily   changed atenolol to 50mg daily   f/u US thyroid  pt may need nuclear thyroid scan, can f/u at outpatient

## 2018-11-05 NOTE — H&P ADULT - PROBLEM SELECTOR PLAN 6
- normocytic anemia- may be 2/2 to thyroid disease  - iron studies in AM  - clarify colonoscopy status in AM

## 2018-11-05 NOTE — H&P ADULT - NSHPSOCIALHISTORY_GEN_ALL_CORE
Non smoker  Non drinker  Lives with brother  Not working at this time  Recently moved from [place]  ** clarify in AM as patient not reliable"

## 2018-11-05 NOTE — CONSULT NOTE ADULT - SUBJECTIVE AND OBJECTIVE BOX
HPI:  Patient is a 58 year old male, recently emigrated from [place], with PMHx of hyperthyroidism who was BIBEMS after mechanical fall. Patient does not remember incident, history obtained from daughter at bedside. Per daughter he was walking, tripped on sidewalk and fell face first, unknown if patient had LOC, was able to get up after short period of time, with abrasion on nose and lac on lip. He is not on any anticoagulation, and is currently being worked up for anemia of unknown etiology. He has short term memory loss, cannot remember details today from before or after the fall, repeatedly asking the same questions. Per family patient has also had increased weakness and lethargy for the past few weeks. No history of falls or instability.   CT head and facial were negative.     PAST MEDICAL & SURGICAL HISTORY:  Asthma    Allergies  No Known Allergies    SHx - No smoking, No ETOH, No drug abuse    Review of Systems:  CONSTITUTIONAL:  No weight loss, fever, chills, weakness or fatigue.  HEENT:  Eyes:  No visual loss, blurred vision, double vision or yellow sclerae. Ears, Nose, Throat:  No hearing loss, sneezing, congestion, runny nose or sore throat.  CARDIOVASCULAR:  No chest pain, chest pressure or chest discomfort. No palpitations or edema.  GASTROINTESTINAL:  No anorexia, nausea, vomiting or diarrhea. No abdominal pain or blood.  NEUROLOGICAL: See HPI  MUSCULOSKELETAL:  No muscle, back pain, joint pain or stiffness.  PSYCHIATRIC:  No history of depression or anxiety.    Vital Signs Last 24 Hrs  T(C): 37.8 (04 Nov 2018 21:24), Max: 37.8 (04 Nov 2018 21:24)  T(F): 100.1 (04 Nov 2018 21:24), Max: 100.1 (04 Nov 2018 21:24)  HR: 102 (04 Nov 2018 20:40) (96 - 102)  BP: 151/65 (04 Nov 2018 20:40) (151/65 - 158/63)  BP(mean): --  RR: 18 (04 Nov 2018 20:40) (16 - 18)  SpO2: 99% (04 Nov 2018 20:40) (99% - 100%)    General Exam:   General appearance: No acute distress, lac and abrasion on face as described above.                 Neurological Exam:  Mental Status: Orientated to self, date and place. Cannot recall events or symptoms prior to or after fall. does not know if he passed out.   No dysarthria, aphasia or neglect.      Cranial Nerves: CN I - not tested.  PERRL, EOMI, VFF, no nystagmus or diplopia.  No APD.    Fundi not visualized bilaterally.    No facial asymmetry.  Hearing intact to finger rub bilaterally.     Motor:   Tone: normal.                  Strength:     [] Upper extremity                      Delt       Bicep    Tricep                                                  R         5/5 5/5        5/5       5/5                                               L          5/5 5/5 5/5 5/5  [] Lower extremity                       HF          KE          KF        DF         PF                                               R        5/5 5/5        5/5 5/5       5/5                                               L         5/5 5/5 5/5 5/5        5/5  Pronator drift: none                 Dysmetria: BL NL FTN  No truncal ataxia.    Tremor: Mild action tremor on right hand, ?parkinsons has been worked up.     Sensation: intact to light touch    Deep Tendon Reflexes:   Right 2+ : BC, TC, BRD   Left 2+ : BC, TC, BRD  Right 2+ Knee, 1+ ankle  Left 2+ Knee, 1+ ankle    Toes flexor bilaterally  Gait: normal and stable.      Other:  Radiology  CT head and facial: negative

## 2018-11-05 NOTE — ED ADULT NURSE REASSESSMENT NOTE - NS ED NURSE REASSESS COMMENT FT1
Report rcvd from prior RN. Pt awake/alert, Aox3. Bruising/abrasion observed to bridge of nose. Pt ambulatory. 18g IV observed in place to R ac. NSR on cardiac monitor. Will continue to monitor.

## 2018-11-05 NOTE — CONSULT NOTE ADULT - SUBJECTIVE AND OBJECTIVE BOX
Patient is a 58y old  Male who presents with a chief complaint of Fall (05 Nov 2018 02:57)      HPI:  History obtained from daughter and patient.  58M with PMhx of HTN, asthma, ?hyperthyroid and sleep apnea presents to the ED after a witness fall. Patient was walking on the sidewalk, and daughter is unsure if he tripped over uneven pavement or passed out. He was on the ground for 1 minute before he was helped up by his family. After the fall, patient could not remember how he got down there, and was disoriented to where he was. He repeatedly asked his daughter what happened and could not recall the events of the day. Patient also stated to his daughter he thought he broke is nose.    After coming the the emergency room, daughter states patient continued to ask the same questions over and over again, how he got to the hospital and what is going on, and after answering the questions, daughter states he would ask again. After she left the ED, he called her 3 times in 30 mins to ask where she went and if he took her wallet.    Daughter states her dad has been very sick for the past few months. He was living in [place] alone, until 4 months ago where he came [place] the [place]. She reports he has been complaining about fatigue and lethargy, and also rendorse getting tired and short of breath on exertion. [place], he has been evaluated by multiple doctors who have not come to the conclusion of what is going on. She says he even becomes shaky sometimes and was evaluated for parkinson's which was ruled out. Recently, his primary care stated he may have some problem with the thyroid, but no medications were started. Daughter states her brother also has hyperthyroid but the does not know the etiology. She also thinks patient may be depressed. Unclear at this time if he has had any cardiac workup.    During evaluation by me, patient unclear how he fell and does not remember the events of the day. He continues to ask repetitive questions. Patient showed a list of medications to provider, and 5 minutes later asked if I would like to see a list of his medications again. In addition, levofloxacin noted on the list, and patient states he was given it because he was having some difficulty breathing    In the ED, VS, 158/62; HR 96; RR16; T 98.4, O2 100% on room air. Patient underwent CT head which showed left nasal fracture deformity. (05 Nov 2018 02:57)      PAST MEDICAL & SURGICAL HISTORY:  Sleep apnea  GERD (gastroesophageal reflux disease)  HTN (hypertension)  Asthma  History of appendectomy         MEDICATIONS  (STANDING):  ATENolol  Tablet 25 milliGRAM(s) Oral two times a day  buDESOnide  80 MICROgram(s)/formoterol 4.5 MICROgram(s) Inhaler 2 Puff(s) Inhalation two times a day  folic acid 1 milliGRAM(s) Oral daily  heparin  Injectable 5000 Unit(s) SubCutaneous every 8 hours  lisinopril 20 milliGRAM(s) Oral daily  pantoprazole    Tablet 40 milliGRAM(s) Oral before breakfast    MEDICATIONS  (PRN):  acetaminophen   Tablet .. 650 milliGRAM(s) Oral every 6 hours PRN Mild Pain (1 - 3), Moderate Pain (4 - 6)  ALBUTerol    90 MICROgram(s) HFA Inhaler 1 Puff(s) Inhalation every 4 hours PRN Shortness of Breath and/or Wheezing      FAMILY HISTORY:  Family history of hyperthyroidism (Child)      SOCIAL HISTORY:      REVIEW OF SYSTEMS:  CONSTITUTIONAL: weakness, fatigue  EYES: No eye pain, visual disturbances, or discharge  ENT:  No difficulty hearing, tinnitus, vertigo; No sinus or throat pain  NECK: No pain or stiffness  RESPIRATORY: No cough, wheezing, chills or hemoptysis; No Shortness of Breath  CARDIOVASCULAR: No chest pain, palpitations, passing out, dizziness, or leg swelling  GASTROINTESTINAL: No abdominal or epigastric pain. No nausea, vomiting, or hematemesis; No diarrhea or constipation. No melena or hematochezia.  GENITOURINARY: No dysuria, frequency, hematuria, or incontinence  NEUROLOGICAL: No headaches, memory loss, loss of strength, numbness, or tremors  SKIN: No itching, burning, rashes, or lesions   LYMPH Nodes: No enlarged glands  ENDOCRINE: No heat or cold intolerance; No hair loss  MUSCULOSKELETAL: No joint pain or swelling; No muscle, back, or extremity pain  PSYCHIATRIC: depression, anxiety  HEME/LYMPH: No easy bruising, or bleeding gums  ALLERGY AND IMMUNOLOGIC: No hives or eczema	        Vital Signs Last 24 Hrs  T(C): 36.4 (05 Nov 2018 10:05), Max: 37.8 (04 Nov 2018 21:24)  T(F): 97.5 (05 Nov 2018 10:05), Max: 100.1 (04 Nov 2018 21:24)  HR: 77 (05 Nov 2018 10:05) (77 - 102)  BP: 120/56 (05 Nov 2018 10:05) (120/56 - 158/63)  BP(mean): --  RR: 22 (05 Nov 2018 10:05) (16 - 22)  SpO2: 99% (05 Nov 2018 10:05) (99% - 100%)      Constitutional: NAD    HEENT: No Lid retraction, no lid lag    Neck:  thyromegaly b/l, right side thyroid nodule, No JVD    Respiratory:  Clear without rales or rhonchi    Cardiovascular:  RR without murmur, rub or gallop.    Gastrointestinal: Soft without hepatosplenomegaly.    Extremities: without cyanosis, clubbing or edema. b/l hands tremors     Skin: normal color, warm     Neurological:  AAOX3 . No gross sensory or motor defects.        LABS:                        11.9   10.11 )-----------( 189      ( 04 Nov 2018 20:50 )             37.4     11-05    139  |  104  |  19  ----------------------------<  97  4.2   |  23  |  0.67    Ca    9.7      05 Nov 2018 06:50    TPro  8.6<H>  /  Alb  3.8  /  TBili  0.6  /  DBili  x   /  AST  21  /  ALT  15  /  AlkPhos  128<H>  11-04        PT/INR - ( 04 Nov 2018 20:50 )   PT: 15.9 SEC;   INR: 1.42          PTT - ( 04 Nov 2018 20:50 )  PTT:30.6 SEC    CAPILLARY BLOOD GLUCOSE      POCT Blood Glucose.: 106 mg/dL (04 Nov 2018 19:56)    Thyroid Function Tests:  11-04 @ 20:50   TSH < 0.10   FreeT4 5.57   T3 413.5   Anti TPO -- Anti Thyroglobulin Ab -- TSI --      Hemoglobin A1C, Whole Blood: 5.0 % [4.0 - 5.6] (11-05-18 @ 06:50)      RADIOLOGY & ADDITIONAL STUDIES: Patient is a 58y old  Male who presents with a chief complaint of Fall (05 Nov 2018 02:57)      HPI:  History obtained from daughter and patient.  58M with PMhx of HTN, asthma, ?hyperthyroid and sleep apnea presents to the ED after a witness fall. Patient was walking on the sidewalk, and daughter is unsure if he tripped over uneven pavement or passed out. He was on the ground for 1 minute before he was helped up by his family. After the fall, patient could not remember how he got down there, and was disoriented to where he was. He repeatedly asked his daughter what happened and could not recall the events of the day. Patient also stated to his daughter he thought he broke is nose.    After coming the the emergency room, daughter states patient continued to ask the same questions over and over again, how he got to the hospital and what is going on, and after answering the questions, daughter states he would ask again. After she left the ED, he called her 3 times in 30 mins to ask where she went and if he took her wallet.    Daughter states her dad has been very sick for the past few months. He was living in [place] alone, until 4 months ago where he came to the [place]. She reports he has been complaining about fatigue and lethargy, and also rendorse getting tired and short of breath on exertion. [place], he has been evaluated by multiple doctors who have not come to the conclusion of what is going on. She says he even becomes shaky sometimes and was evaluated for parkinson's which was ruled out. Recently, his primary care stated he may have some problem with the thyroid, but no medications were started. Daughter states her brother also has hyperthyroid but the does not know the etiology. She also thinks patient may be depressed. Unclear at this time if he has had any cardiac workup.    During evaluation by me, patient unclear how he fell and does not remember the events of the day. He continues to ask repetitive questions. Patient showed a list of medications to provider, and 5 minutes later asked if I would like to see a list of his medications again. In addition, levofloxacin noted on the list, and patient states he was given it because he was having some difficulty breathing    In the ED, VS, 158/62; HR 96; RR16; T 98.4, O2 100% on room air. Patient underwent CT head which showed left nasal fracture deformity. (05 Nov 2018 02:57)      PAST MEDICAL & SURGICAL HISTORY:  Sleep apnea  GERD (gastroesophageal reflux disease)  HTN (hypertension)  Asthma  History of appendectomy         MEDICATIONS  (STANDING):  ATENolol  Tablet 25 milliGRAM(s) Oral two times a day  buDESOnide  80 MICROgram(s)/formoterol 4.5 MICROgram(s) Inhaler 2 Puff(s) Inhalation two times a day  folic acid 1 milliGRAM(s) Oral daily  heparin  Injectable 5000 Unit(s) SubCutaneous every 8 hours  lisinopril 20 milliGRAM(s) Oral daily  pantoprazole    Tablet 40 milliGRAM(s) Oral before breakfast    MEDICATIONS  (PRN):  acetaminophen   Tablet .. 650 milliGRAM(s) Oral every 6 hours PRN Mild Pain (1 - 3), Moderate Pain (4 - 6)  ALBUTerol    90 MICROgram(s) HFA Inhaler 1 Puff(s) Inhalation every 4 hours PRN Shortness of Breath and/or Wheezing      FAMILY HISTORY:  Family history of thyroid problems, unsure overactive or underactive       SOCIAL HISTORY:      REVIEW OF SYSTEMS:  CONSTITUTIONAL: weakness, fatigue  EYES: No eye pain, visual disturbances, or discharge  ENT:  No difficulty hearing, tinnitus, vertigo; No sinus or throat pain  NECK: No pain or stiffness  RESPIRATORY: No cough, wheezing, chills or hemoptysis; No Shortness of Breath  CARDIOVASCULAR: No chest pain, palpitations, passing out, dizziness, or leg swelling  GASTROINTESTINAL: No abdominal or epigastric pain. No nausea, vomiting, or hematemesis; No diarrhea or constipation. No melena or hematochezia.  GENITOURINARY: No dysuria, frequency, hematuria, or incontinence  NEUROLOGICAL: No headaches, memory loss, loss of strength, numbness, or tremors  SKIN: No itching, burning, rashes, or lesions   LYMPH Nodes: No enlarged glands  ENDOCRINE: No heat or cold intolerance; No hair loss  MUSCULOSKELETAL: No joint pain or swelling; No muscle, back, or extremity pain  PSYCHIATRIC: depression, anxiety  HEME/LYMPH: No easy bruising, or bleeding gums  ALLERGY AND IMMUNOLOGIC: No hives or eczema	        Vital Signs Last 24 Hrs  T(C): 36.4 (05 Nov 2018 10:05), Max: 37.8 (04 Nov 2018 21:24)  T(F): 97.5 (05 Nov 2018 10:05), Max: 100.1 (04 Nov 2018 21:24)  HR: 77 (05 Nov 2018 10:05) (77 - 102)  BP: 120/56 (05 Nov 2018 10:05) (120/56 - 158/63)  BP(mean): --  RR: 22 (05 Nov 2018 10:05) (16 - 22)  SpO2: 99% (05 Nov 2018 10:05) (99% - 100%)      Constitutional: NAD    HEENT: No Lid retraction, no lid lag    Neck:  thyromegaly b/l, right side thyroid nodule, No JVD    Respiratory:  Clear without rales or rhonchi    Cardiovascular:  RR without murmur, rub or gallop.    Gastrointestinal: Soft without hepatosplenomegaly.    Extremities: without cyanosis, clubbing or edema. b/l hands tremors     Skin: normal color, warm     Neurological:  AAOX3 . No gross sensory or motor defects.        LABS:                        11.9   10.11 )-----------( 189      ( 04 Nov 2018 20:50 )             37.4     11-05    139  |  104  |  19  ----------------------------<  97  4.2   |  23  |  0.67    Ca    9.7      05 Nov 2018 06:50    TPro  8.6<H>  /  Alb  3.8  /  TBili  0.6  /  DBili  x   /  AST  21  /  ALT  15  /  AlkPhos  128<H>  11-04        PT/INR - ( 04 Nov 2018 20:50 )   PT: 15.9 SEC;   INR: 1.42          PTT - ( 04 Nov 2018 20:50 )  PTT:30.6 SEC    CAPILLARY BLOOD GLUCOSE      POCT Blood Glucose.: 106 mg/dL (04 Nov 2018 19:56)    Thyroid Function Tests:  11-04 @ 20:50   TSH < 0.10   FreeT4 5.57   T3 413.5   Anti TPO -- Anti Thyroglobulin Ab -- TSI --      Hemoglobin A1C, Whole Blood: 5.0 % [4.0 - 5.6] (11-05-18 @ 06:50)      RADIOLOGY & ADDITIONAL STUDIES: Patient is a 58y old  Male who presents with a chief complaint of Fall (05 Nov 2018 02:57)      HPI:  History obtained from daughter and patient.  58M with PMhx of HTN, asthma, ?hyperthyroid and sleep apnea presents to the ED after a witness fall. Patient was walking on the sidewalk, and daughter is unsure if he tripped over uneven pavement or passed out. He was on the ground for 1 minute before he was helped up by his family. After the fall, patient could not remember how he got down there, and was disoriented to where he was. He repeatedly asked his daughter what happened and could not recall the events of the day. Patient also stated to his daughter he thought he broke is nose.    After coming the the emergency room, daughter states patient continued to ask the same questions over and over again, how he got to the hospital and what is going on, and after answering the questions, daughter states he would ask again. After she left the ED, he called her 3 times in 30 mins to ask where she went and if he took her wallet.    Daughter states her dad has been very sick for the past few months. He was living in [place] alone, until 4 months ago where he came [place] the [place]. She reports he has been complaining about fatigue and lethargy, and also rendorse getting tired and short of breath on exertion. [place], he has been evaluated by multiple doctors who have not come to the conclusion of what is going on. She says he even becomes shaky sometimes and was evaluated for parkinson's which was ruled out. Recently, his primary care stated he may have some problem with the thyroid, but no medications were started. Daughter states her brother also has hyperthyroid but the does not know the etiology. She also thinks patient may be depressed. Unclear at this time if he has had any cardiac workup.    During evaluation by me, patient unclear how he fell and does not remember the events of the day. He continues to ask repetitive questions. Patient showed a list of medications to provider, and 5 minutes later asked if I would like to see a list of his medications again. In addition, levofloxacin noted on the list, and patient states he was given it because he was having some difficulty breathing    In the ED, VS, 158/62; HR 96; RR16; T 98.4, O2 100% on room air. Patient underwent CT head which showed left nasal fracture deformity. (05 Nov 2018 02:57)    Endocrine history:  58 M PMH of Asthma, HTN, ?hyperthyroidism, sleep apnea, GERD presented to ED s/p witness fall s/p mechanical fall vs syncope. Pt also c/o fatigue, hands tremor, sob, dyspnea on exertion, anxiety/ depression, wt loss 70lbs (From 270lbs to 200lbs since 11/2017) with loss of appetite. Positive family hx of hyperthyroidism (mother and brother ). Pt denies palpitation, heat intolerance, radiation exposure during childhood,  hyperdefecation , urinary frequency.   Pt visited his PCP DR. Mcclellan ([phone] office ) and was sent to nuclear scan 2 wks ago. Pt has report noted for parathyroids scan on 10/17/18, result noted normal.       PAST MEDICAL & SURGICAL HISTORY:  Sleep apnea  GERD (gastroesophageal reflux disease)  HTN (hypertension)  Asthma  History of appendectomy         MEDICATIONS  (STANDING):  ATENolol  Tablet 25 milliGRAM(s) Oral two times a day  buDESOnide  80 MICROgram(s)/formoterol 4.5 MICROgram(s) Inhaler 2 Puff(s) Inhalation two times a day  folic acid 1 milliGRAM(s) Oral daily  heparin  Injectable 5000 Unit(s) SubCutaneous every 8 hours  lisinopril 20 milliGRAM(s) Oral daily  pantoprazole    Tablet 40 milliGRAM(s) Oral before breakfast    MEDICATIONS  (PRN):  acetaminophen   Tablet .. 650 milliGRAM(s) Oral every 6 hours PRN Mild Pain (1 - 3), Moderate Pain (4 - 6)  ALBUTerol    90 MICROgram(s) HFA Inhaler 1 Puff(s) Inhalation every 4 hours PRN Shortness of Breath and/or Wheezing      FAMILY HISTORY:  Family history of thyroid problems, unsure overactive or underactive       SOCIAL HISTORY:      REVIEW OF SYSTEMS:  CONSTITUTIONAL: weakness, fatigue  EYES: No eye pain, visual disturbances, or discharge  ENT:  No difficulty hearing, tinnitus, vertigo; No sinus or throat pain  NECK: No pain or stiffness  RESPIRATORY: No cough, wheezing, chills or hemoptysis; No Shortness of Breath  CARDIOVASCULAR: No chest pain, palpitations, passing out, dizziness, or leg swelling  GASTROINTESTINAL: No abdominal or epigastric pain. No nausea, vomiting, or hematemesis; No diarrhea or constipation. No melena or hematochezia.  GENITOURINARY: No dysuria, frequency, hematuria, or incontinence  NEUROLOGICAL: No headaches, memory loss, loss of strength, numbness, or tremors  SKIN: No itching, burning, rashes, or lesions   LYMPH Nodes: No enlarged glands  ENDOCRINE: No heat or cold intolerance; No hair loss  MUSCULOSKELETAL: No joint pain or swelling; No muscle, back, or extremity pain  PSYCHIATRIC: depression, anxiety  HEME/LYMPH: No easy bruising, or bleeding gums  ALLERGY AND IMMUNOLOGIC: No hives or eczema	        Vital Signs Last 24 Hrs  T(C): 36.4 (05 Nov 2018 10:05), Max: 37.8 (04 Nov 2018 21:24)  T(F): 97.5 (05 Nov 2018 10:05), Max: 100.1 (04 Nov 2018 21:24)  HR: 77 (05 Nov 2018 10:05) (77 - 102)  BP: 120/56 (05 Nov 2018 10:05) (120/56 - 158/63)  BP(mean): --  RR: 22 (05 Nov 2018 10:05) (16 - 22)  SpO2: 99% (05 Nov 2018 10:05) (99% - 100%)      Constitutional: NAD    HEENT: No Lid retraction, no lid lag    Neck:  thyromegaly b/l, right side thyroid nodule, No JVD    Respiratory:  Clear without rales or rhonchi    Cardiovascular:  RR without murmur, rub or gallop.    Gastrointestinal: Soft without hepatosplenomegaly.    Extremities: without cyanosis, clubbing or edema. b/l hands tremors     Skin: normal color, warm     Neurological:  AAOX3 . No gross sensory or motor defects.        LABS:                        11.9   10.11 )-----------( 189      ( 04 Nov 2018 20:50 )             37.4     11-05    139  |  104  |  19  ----------------------------<  97  4.2   |  23  |  0.67    Ca    9.7      05 Nov 2018 06:50    TPro  8.6<H>  /  Alb  3.8  /  TBili  0.6  /  DBili  x   /  AST  21  /  ALT  15  /  AlkPhos  128<H>  11-04        PT/INR - ( 04 Nov 2018 20:50 )   PT: 15.9 SEC;   INR: 1.42          PTT - ( 04 Nov 2018 20:50 )  PTT:30.6 SEC    CAPILLARY BLOOD GLUCOSE      POCT Blood Glucose.: 106 mg/dL (04 Nov 2018 19:56)    Thyroid Function Tests:  11-04 @ 20:50   TSH < 0.10   FreeT4 5.57   T3 413.5   Anti TPO -- Anti Thyroglobulin Ab -- TSI --      Hemoglobin A1C, Whole Blood: 5.0 % [4.0 - 5.6] (11-05-18 @ 06:50)      RADIOLOGY & ADDITIONAL STUDIES:

## 2018-11-06 ENCOUNTER — TRANSCRIPTION ENCOUNTER (OUTPATIENT)
Age: 58
End: 2018-11-06

## 2018-11-06 VITALS
OXYGEN SATURATION: 100 % | HEART RATE: 86 BPM | RESPIRATION RATE: 16 BRPM | SYSTOLIC BLOOD PRESSURE: 134 MMHG | TEMPERATURE: 98 F | DIASTOLIC BLOOD PRESSURE: 82 MMHG

## 2018-11-06 DIAGNOSIS — Z71.89 OTHER SPECIFIED COUNSELING: ICD-10-CM

## 2018-11-06 LAB
BUN SERPL-MCNC: 13 MG/DL — SIGNIFICANT CHANGE UP (ref 7–23)
CALCIUM SERPL-MCNC: 9.6 MG/DL — SIGNIFICANT CHANGE UP (ref 8.4–10.5)
CHLORIDE SERPL-SCNC: 104 MMOL/L — SIGNIFICANT CHANGE UP (ref 98–107)
CO2 SERPL-SCNC: 24 MMOL/L — SIGNIFICANT CHANGE UP (ref 22–31)
CREAT SERPL-MCNC: 0.56 MG/DL — SIGNIFICANT CHANGE UP (ref 0.5–1.3)
GLUCOSE SERPL-MCNC: 97 MG/DL — SIGNIFICANT CHANGE UP (ref 70–99)
HCT VFR BLD CALC: 33.3 % — LOW (ref 39–50)
HGB BLD-MCNC: 10.7 G/DL — LOW (ref 13–17)
MCHC RBC-ENTMCNC: 28.1 PG — SIGNIFICANT CHANGE UP (ref 27–34)
MCHC RBC-ENTMCNC: 32.1 % — SIGNIFICANT CHANGE UP (ref 32–36)
MCV RBC AUTO: 87.4 FL — SIGNIFICANT CHANGE UP (ref 80–100)
NRBC # FLD: 0 — SIGNIFICANT CHANGE UP
PLATELET # BLD AUTO: 148 K/UL — LOW (ref 150–400)
PMV BLD: 10.5 FL — SIGNIFICANT CHANGE UP (ref 7–13)
POTASSIUM SERPL-MCNC: 4 MMOL/L — SIGNIFICANT CHANGE UP (ref 3.5–5.3)
POTASSIUM SERPL-SCNC: 4 MMOL/L — SIGNIFICANT CHANGE UP (ref 3.5–5.3)
PTH-INTACT SERPL-MCNC: 12.06 PG/ML — LOW (ref 15–65)
RBC # BLD: 3.81 M/UL — LOW (ref 4.2–5.8)
RBC # FLD: 13.5 % — SIGNIFICANT CHANGE UP (ref 10.3–14.5)
SODIUM SERPL-SCNC: 140 MMOL/L — SIGNIFICANT CHANGE UP (ref 135–145)
T3FREE SERPL-MCNC: 14.03 PG/ML — HIGH (ref 1.8–4.6)
T4 FREE SERPL-MCNC: 4.66 NG/DL — HIGH (ref 0.9–1.8)
THYROGLOB AB FLD IA-ACNC: <20 IU/ML — SIGNIFICANT CHANGE UP (ref 0–40)
THYROGLOB AB SERPL-ACNC: <20 IU/ML — SIGNIFICANT CHANGE UP (ref 0–40)
THYROGLOB SERPL-MCNC: 91 NG/ML — HIGH (ref 1.6–59.9)
THYROPEROXIDASE AB SERPL-ACNC: 31.3 IU/ML — SIGNIFICANT CHANGE UP (ref 0–34.9)
TSH SERPL-MCNC: < 0.1 UIU/ML — LOW (ref 0.27–4.2)
WBC # BLD: 5.92 K/UL — SIGNIFICANT CHANGE UP (ref 3.8–10.5)
WBC # FLD AUTO: 5.92 K/UL — SIGNIFICANT CHANGE UP (ref 3.8–10.5)

## 2018-11-06 PROCEDURE — 76536 US EXAM OF HEAD AND NECK: CPT | Mod: 26

## 2018-11-06 PROCEDURE — 99232 SBSQ HOSP IP/OBS MODERATE 35: CPT | Mod: GC

## 2018-11-06 PROCEDURE — 70551 MRI BRAIN STEM W/O DYE: CPT | Mod: 26

## 2018-11-06 RX ORDER — ATENOLOL 25 MG/1
25 TABLET ORAL
Qty: 0 | Refills: 0 | COMMUNITY

## 2018-11-06 RX ORDER — CIPROFLOXACIN LACTATE 400MG/40ML
1 VIAL (ML) INTRAVENOUS
Qty: 0 | Refills: 0 | COMMUNITY

## 2018-11-06 RX ORDER — INFLUENZA VIRUS VACCINE 15; 15; 15; 15 UG/.5ML; UG/.5ML; UG/.5ML; UG/.5ML
0.5 SUSPENSION INTRAMUSCULAR ONCE
Qty: 0 | Refills: 0 | Status: DISCONTINUED | OUTPATIENT
Start: 2018-11-06 | End: 2018-11-06

## 2018-11-06 RX ORDER — ATENOLOL 25 MG/1
1 TABLET ORAL
Qty: 30 | Refills: 0
Start: 2018-11-06 | End: 2018-12-05

## 2018-11-06 RX ORDER — CHOLECALCIFEROL (VITAMIN D3) 125 MCG
1 CAPSULE ORAL
Qty: 0 | Refills: 0 | COMMUNITY

## 2018-11-06 RX ORDER — METHIMAZOLE 10 MG/1
3 TABLET ORAL
Qty: 0 | Refills: 0 | COMMUNITY
Start: 2018-11-06

## 2018-11-06 RX ORDER — METHIMAZOLE 10 MG/1
2 TABLET ORAL
Qty: 60 | Refills: 0
Start: 2018-11-06 | End: 2018-12-05

## 2018-11-06 RX ADMIN — HEPARIN SODIUM 5000 UNIT(S): 5000 INJECTION INTRAVENOUS; SUBCUTANEOUS at 14:14

## 2018-11-06 RX ADMIN — Medication 1 MILLIGRAM(S): at 12:09

## 2018-11-06 RX ADMIN — BUDESONIDE AND FORMOTEROL FUMARATE DIHYDRATE 2 PUFF(S): 160; 4.5 AEROSOL RESPIRATORY (INHALATION) at 09:07

## 2018-11-06 RX ADMIN — Medication 650 MILLIGRAM(S): at 09:07

## 2018-11-06 RX ADMIN — ATENOLOL 50 MILLIGRAM(S): 25 TABLET ORAL at 05:06

## 2018-11-06 RX ADMIN — BUDESONIDE AND FORMOTEROL FUMARATE DIHYDRATE 2 PUFF(S): 160; 4.5 AEROSOL RESPIRATORY (INHALATION) at 00:43

## 2018-11-06 RX ADMIN — Medication 650 MILLIGRAM(S): at 09:37

## 2018-11-06 RX ADMIN — PANTOPRAZOLE SODIUM 40 MILLIGRAM(S): 20 TABLET, DELAYED RELEASE ORAL at 07:12

## 2018-11-06 RX ADMIN — LISINOPRIL 20 MILLIGRAM(S): 2.5 TABLET ORAL at 05:06

## 2018-11-06 RX ADMIN — Medication 650 MILLIGRAM(S): at 19:21

## 2018-11-06 RX ADMIN — HEPARIN SODIUM 5000 UNIT(S): 5000 INJECTION INTRAVENOUS; SUBCUTANEOUS at 05:06

## 2018-11-06 RX ADMIN — HEPARIN SODIUM 5000 UNIT(S): 5000 INJECTION INTRAVENOUS; SUBCUTANEOUS at 00:42

## 2018-11-06 NOTE — DISCHARGE NOTE ADULT - HOSPITAL COURSE
History obtained from daughter and patient.  58M with PMhx of HTN, asthma, ?hyperthyroid and sleep apnea presents to the ED after a witness fall. Patient was walking on the sidewalk, and daughter is unsure if he tripped over uneven pavement or passed out. He was on the ground for 1 minute before he was helped up by his family. After the fall, patient could not remember how he got down there, and was disoriented to where he was. He repeatedly asked his daughter what happened and could not recall the events of the day. Patient also stated to his daughter he thought he broke is nose.    After coming the the emergency room, daughter states patient continued to ask the same questions over and over again, how he got to the hospital and what is going on, and after answering the questions, daughter states he would ask again. After she left the ED, he called her 3 times in 30 mins to ask where she went and if he took her wallet.    Daughter states her dad has been very sick for the past few months. He was living in [place] alone, until 4 months ago where he came [place] the [place]. She reports he has been complaining about fatigue and lethargy, and also rendorse getting tired and short of breath on exertion. [place], he has been evaluated by multiple doctors who have not come to the conclusion of what is going on. She says he even becomes shaky sometimes and was evaluated for parkinson's which was ruled out. Recently, his primary care stated he may have some problem with the thyroid, but no medications were started. Daughter states her brother also has hyperthyroid but the does not know the etiology. She also thinks patient may be depressed. Unclear at this time if he has had any cardiac workup.    During evaluation by me, patient unclear how he fell and does not remember the events of the day. He continues to ask repetitive questions. Patient showed a list of medications to provider, and 5 minutes later asked if I would like to see a list of his medications again. In addition, levofloxacin noted on the list, and patient states he was given it because he was having some difficulty breathing    In the ED, VS, 158/62; HR 96; RR16; T 98.4, O2 100% on room air. Patient underwent CT head which showed left nasal fracture deformity. (05 Nov 2018 02:57)    58M with recently diagnosed hyperthyroidism (about 2 weeks ago by pcp) - he was sent for a "nuclear medicine scan" which turns out to be a sestamibi scan (parathyroid scan) which was normal. He did not have an uptake and scan. He was on atenolol prior to admission but states this is not a new medication. Patient with significant weight loss and tremors. Check thyroid antibodies, thyroid ultrasound. Would hold off on nuclear scan for now. Start methimazole 30mg daily, atenolol 50mg daily.   Methimazole decrased to 20 qD for disharge. advsied to have outpt follow up.

## 2018-11-06 NOTE — DISCHARGE NOTE ADULT - MEDICATION SUMMARY - MEDICATIONS TO CHANGE
I will SWITCH the dose or number of times a day I take the medications listed below when I get home from the hospital:    atenolol  -- 25 milligram(s) by mouth 2 times a day

## 2018-11-06 NOTE — PROGRESS NOTE ADULT - SUBJECTIVE AND OBJECTIVE BOX
SUBJECTIVE / OVERNIGHT EVENTS: pt denies chest pain, shortness of breath, nausea,v      MEDICATIONS  (STANDING):  ATENolol  Tablet 50 milliGRAM(s) Oral daily  buDESOnide  80 MICROgram(s)/formoterol 4.5 MICROgram(s) Inhaler 2 Puff(s) Inhalation two times a day  folic acid 1 milliGRAM(s) Oral daily  heparin  Injectable 5000 Unit(s) SubCutaneous every 8 hours  influenza   Vaccine 0.5 milliLiter(s) IntraMuscular once  lisinopril 20 milliGRAM(s) Oral daily  methimazole 30 milliGRAM(s) Oral daily  pantoprazole    Tablet 40 milliGRAM(s) Oral before breakfast    MEDICATIONS  (PRN):  acetaminophen   Tablet .. 650 milliGRAM(s) Oral every 6 hours PRN Mild Pain (1 - 3), Moderate Pain (4 - 6)  ALBUTerol    90 MICROgram(s) HFA Inhaler 1 Puff(s) Inhalation every 4 hours PRN Shortness of Breath and/or Wheezing    Vital Signs Last 24 Hrs  T(C): 36.7 (06 Nov 2018 20:03), Max: 36.7 (06 Nov 2018 12:19)  T(F): 98 (06 Nov 2018 20:03), Max: 98 (06 Nov 2018 12:19)  HR: 86 (06 Nov 2018 20:03) (82 - 94)  BP: 134/82 (06 Nov 2018 20:03) (127/61 - 141/72)  BP(mean): --  RR: 16 (06 Nov 2018 20:03) (16 - 17)  SpO2: 100% (06 Nov 2018 20:03) (100% - 100%)    CAPILLARY BLOOD GLUCOSE        I&O's Summary      Constitutional: No fever, fatigue  Skin: No rash.  Eyes: No recent vision problems or eye pain.  ENT: No congestion, ear pain, or sore throat.  Cardiovascular: No chest pain or palpation.  Respiratory: No cough, shortness of breath, congestion, or wheezing.  Gastrointestinal: No abdominal pain, nausea, vomiting, or diarrhea.  Genitourinary: No dysuria.  Musculoskeletal: No joint swelling.  Neurologic: No headache.    PHYSICAL EXAM:  GENERAL: NAD  EYES: EOMI, PERRLA  NECK: Supple, No JVD  CHEST/LUNG: cta hardik  HEART:  S1 , S2 +  ABDOMEN: soft, bs+  EXTREMITIES:  no edema  NEUROLOGY:alert awake oriented       LABS:                        10.7   5.92  )-----------( 148      ( 06 Nov 2018 06:30 )             33.3     11-06    140  |  104  |  13  ----------------------------<  97  4.0   |  24  |  0.56    Ca    9.6      06 Nov 2018 06:30                RADIOLOGY & ADDITIONAL TESTS:    Imaging Personally Reviewed:    Consultant(s) Notes Reviewed:      Care Discussed with Consultants/Other Providers:

## 2018-11-06 NOTE — DISCHARGE NOTE ADULT - PATIENT PORTAL LINK FT
You can access the [facility] Patient Portal, offered by [facility], by registering with the following website: [email or web address]

## 2018-11-06 NOTE — PROGRESS NOTE ADULT - ATTENDING COMMENTS
None
58M with new hyperthyroidism which likely explains the majority of his symptoms (weight loss, tremors).  Ok to dc on medical therapy with methimazole 20mg daily and Atenolol 50mg daily and close outpatient endocrine follow up [phone].

## 2018-11-06 NOTE — PROGRESS NOTE ADULT - PROBLEM SELECTOR PLAN 2
- unclear if fall was mechanical; pt with downtime on the ground as well  - will observe on tele to evaluate for arrythmia as unclear if patient with LOC and given uncontrolled hyperthyroidism, at risk for arrythmia  - EKG shows sinus tachycardia - improved after BB

## 2018-11-06 NOTE — PROGRESS NOTE ADULT - SUBJECTIVE AND OBJECTIVE BOX
chief complains: 59 yo M presented to ED c/o witnessed fall and memory loss    Overnight events :     History:      58 M PMH of Asthma, HTN, hyperthyroidism, sleep apnea, GERD presented to ED c/o fall s/p mechanical fall vs syncope. Pt also c/o fatigue, hands tremor, sob, dyspnea on exertion, anxiety/ depression, wt loss 70lbs (From 270lbs to 200lbs since 11/2017) with loss of appetite. Positive family hx of hyperthyroidism. Endo consulted for hyperthyroidism.      MEDICATIONS  (STANDING):  ATENolol  Tablet 50 milliGRAM(s) Oral daily  buDESOnide  80 MICROgram(s)/formoterol 4.5 MICROgram(s) Inhaler 2 Puff(s) Inhalation two times a day  folic acid 1 milliGRAM(s) Oral daily  heparin  Injectable 5000 Unit(s) SubCutaneous every 8 hours  lisinopril 20 milliGRAM(s) Oral daily  methimazole 30 milliGRAM(s) Oral daily  pantoprazole    Tablet 40 milliGRAM(s) Oral before breakfast    MEDICATIONS  (PRN):  acetaminophen   Tablet .. 650 milliGRAM(s) Oral every 6 hours PRN Mild Pain (1 - 3), Moderate Pain (4 - 6)  ALBUTerol    90 MICROgram(s) HFA Inhaler 1 Puff(s) Inhalation every 4 hours PRN Shortness of Breath and/or Wheezing      Allergies    No Known Allergies    Intolerances      Review of Systems:  Constitutional: No fever  Eyes: No blurry vision  Neuro: No tremors  HEENT: No pain  Cardiovascular: No chest pain, palpitations  Respiratory: No SOB, no cough  GI: No nausea, vomiting, abdominal pain  : No dysuria  Skin: no rash  Psych: no depression  Endocrine: no polyuria, polydipsia  Hem/lymph: no swelling  Osteoporosis: no fractures    ALL OTHER SYSTEMS REVIEWED AND NEGATIVE    UNABLE TO OBTAIN    PHYSICAL EXAM:  VITALS: T(C): 36.6 (11-05-18 @ 22:13)  T(F): 97.8 (11-05-18 @ 22:13), Max: 97.8 (11-05-18 @ 22:13)  HR: 90 (11-06-18 @ 05:04) (77 - 94)  BP: 127/61 (11-06-18 @ 05:04) (120/56 - 152/66)  RR:  (14 - 22)  SpO2:  (98% - 100%)  Wt(kg): --  GENERAL: NAD, well-groomed, well-developed  EYES: No proptosis, no lid lag, anicteric  HEENT:  Atraumatic, Normocephalic, moist mucous membranes  THYROID: Normal size, no palpable nodules  RESPIRATORY: Clear to auscultation bilaterally; No rales, rhonchi, wheezing  CARDIOVASCULAR: Regular rate and rhythm; No murmurs; no peripheral edema  GI: Soft, nontender, non distended  SKIN: Dry, intact, No rashes or lesions  MUSCULOSKELETAL: Full range of motion, normal strength  NEURO: extraocular movements intact, no tremor  PSYCH: Alert and oriented x 3, normal affect, normal mood      CAPILLARY BLOOD GLUCOSE          11-06    140  |  104  |  13  ----------------------------<  97  4.0   |  24  |  0.56    EGFR if : 131  EGFR if non : 113    Ca    9.6      11-06    TPro  8.6<H>  /  Alb  3.8  /  TBili  0.6  /  DBili  x   /  AST  21  /  ALT  15  /  AlkPhos  128<H>  11-04          Thyroid Function Tests:  11-06 @ 06:30 TSH < 0.10 FreeT4 4.66 T3 -- Anti TPO -- Anti Thyroglobulin Ab -- TSI --  11-04 @ 20:50 TSH < 0.10 FreeT4 5.57 T3 413.5 Anti TPO -- Anti Thyroglobulin Ab -- TSI --      Hemoglobin A1C, Whole Blood: 5.0 % [4.0 - 5.6] (11-05-18 @ 06:50) chief complains: 57 yo M presented to ED c/o witnessed fall and memory loss    Overnight events : Pt was seen and examined at bedside, no new overnight events, still has b/l hand tremors, no change. Denies palpitation, diaphoresis. US thyroid noted diffusely heterogeneous gland with 9 mm left upper pole echogenic nodule.    History:      58 M PMH of Asthma, HTN, hyperthyroidism, sleep apnea, GERD presented to ED c/o fall s/p mechanical fall vs syncope. Pt also c/o fatigue, hands tremor, sob, dyspnea on exertion, anxiety/ depression, wt loss 70lbs (From 270lbs to 200lbs since 11/2017) with loss of appetite. Positive family hx of hyperthyroidism. Endo consulted for hyperthyroidism.      MEDICATIONS  (STANDING):  ATENolol  Tablet 50 milliGRAM(s) Oral daily  buDESOnide  80 MICROgram(s)/formoterol 4.5 MICROgram(s) Inhaler 2 Puff(s) Inhalation two times a day  folic acid 1 milliGRAM(s) Oral daily  heparin  Injectable 5000 Unit(s) SubCutaneous every 8 hours  lisinopril 20 milliGRAM(s) Oral daily  methimazole 30 milliGRAM(s) Oral daily  pantoprazole    Tablet 40 milliGRAM(s) Oral before breakfast    MEDICATIONS  (PRN):  acetaminophen   Tablet .. 650 milliGRAM(s) Oral every 6 hours PRN Mild Pain (1 - 3), Moderate Pain (4 - 6)  ALBUTerol    90 MICROgram(s) HFA Inhaler 1 Puff(s) Inhalation every 4 hours PRN Shortness of Breath and/or Wheezing      Allergies    No Known Allergies    Intolerances      Review of Systems:  Constitutional: No fever  Eyes: No blurry vision  Neuro: No tremors  HEENT: No pain  Cardiovascular: No chest pain, palpitations  Respiratory: No SOB, no cough  GI: No nausea, vomiting, abdominal pain  : No dysuria  Skin: no rash  Psych: no depression  Endocrine: no polyuria, polydipsia  Hem/lymph: no swelling  Osteoporosis: no fractures    ALL OTHER SYSTEMS REVIEWED AND NEGATIVE    UNABLE TO OBTAIN    PHYSICAL EXAM:  VITALS: T(C): 36.6 (11-05-18 @ 22:13)  T(F): 97.8 (11-05-18 @ 22:13), Max: 97.8 (11-05-18 @ 22:13)  HR: 90 (11-06-18 @ 05:04) (77 - 94)  BP: 127/61 (11-06-18 @ 05:04) (120/56 - 152/66)  RR:  (14 - 22)  SpO2:  (98% - 100%)  Wt(kg): --  GENERAL: NAD, well-groomed, well-developed  EYES: No proptosis, no lid lag, anicteric  HEENT:  Atraumatic, Normocephalic, moist mucous membranes  THYROID: Normal size, no palpable nodules  RESPIRATORY: Clear to auscultation bilaterally; No rales, rhonchi, wheezing  CARDIOVASCULAR: Regular rate and rhythm; No murmurs; no peripheral edema  GI: Soft, nontender, non distended  SKIN: Dry, intact, No rashes or lesions  MUSCULOSKELETAL: Full range of motion, normal strength  NEURO: extraocular movements intact, no tremor  PSYCH: Alert and oriented x 3, normal affect, normal mood      CAPILLARY BLOOD GLUCOSE          11-06    140  |  104  |  13  ----------------------------<  97  4.0   |  24  |  0.56    EGFR if : 131  EGFR if non : 113    Ca    9.6      11-06    TPro  8.6<H>  /  Alb  3.8  /  TBili  0.6  /  DBili  x   /  AST  21  /  ALT  15  /  AlkPhos  128<H>  11-04          Thyroid Function Tests:  11-06 @ 06:30 TSH < 0.10 FreeT4 4.66 T3 -- Anti TPO -- Anti Thyroglobulin Ab -- TSI --  11-04 @ 20:50 TSH < 0.10 FreeT4 5.57 T3 413.5 Anti TPO -- Anti Thyroglobulin Ab -- TSI --      Hemoglobin A1C, Whole Blood: 5.0 % [4.0 - 5.6] (11-05-18 @ 06:50)    < from: US Thyroid + Parathyroid (11.06.18 @ 10:43) >  FINDINGS:    Right Lobe: 4.5 x 2.3 x 1.9 cm. Heterogeneous without discrete nodule.    Left Lobe: 4.5 x 2.2 x 1.5 cm. Heterogeneous with 9 x 7 x 9 mm upper pole   echogenic nodule.    Isthmus: 3 mm. Within normal limits.    Cervical Lymph Nodes: No enlarged or abnormal morphology cervical nodes.    < end of copied text > chief complains: 57 yo M presented to ED c/o witnessed fall and memory loss    Overnight events : Pt was seen and examined at bedside, no new overnight events, still has b/l hand tremors, no change. Denies palpitation, diaphoresis. US thyroid noted diffusely heterogeneous gland with 9 mm left upper pole echogenic nodule.    History:      58 M PMH of Asthma, HTN, hyperthyroidism, sleep apnea, GERD presented to ED c/o fall s/p mechanical fall vs syncope. Pt also c/o fatigue, hands tremor, sob, dyspnea on exertion, anxiety/ depression, wt loss 70lbs (From 270lbs to 200lbs since 11/2017) with loss of appetite. Positive family hx of hyperthyroidism. Endo consulted for hyperthyroidism.      MEDICATIONS  (STANDING):  ATENolol  Tablet 50 milliGRAM(s) Oral daily  buDESOnide  80 MICROgram(s)/formoterol 4.5 MICROgram(s) Inhaler 2 Puff(s) Inhalation two times a day  folic acid 1 milliGRAM(s) Oral daily  heparin  Injectable 5000 Unit(s) SubCutaneous every 8 hours  lisinopril 20 milliGRAM(s) Oral daily  methimazole 30 milliGRAM(s) Oral daily  pantoprazole    Tablet 40 milliGRAM(s) Oral before breakfast    MEDICATIONS  (PRN):  acetaminophen   Tablet .. 650 milliGRAM(s) Oral every 6 hours PRN Mild Pain (1 - 3), Moderate Pain (4 - 6)  ALBUTerol    90 MICROgram(s) HFA Inhaler 1 Puff(s) Inhalation every 4 hours PRN Shortness of Breath and/or Wheezing      Allergies    No Known Allergies    Intolerances      Review of Systems:  Constitutional: No fever  Eyes: No blurry vision  Neuro: No tremors  HEENT: No pain  Cardiovascular: No chest pain, palpitations  Respiratory: No SOB, no cough  GI: No nausea, vomiting, abdominal pain  : No dysuria  Skin: no rash  Psych: no depression  Endocrine: no polyuria, polydipsia  Hem/lymph: no swelling  Osteoporosis: no fractures    ALL OTHER SYSTEMS REVIEWED AND NEGATIVE    UNABLE TO OBTAIN    PHYSICAL EXAM:  VITALS: T(C): 36.6 (11-05-18 @ 22:13)  T(F): 97.8 (11-05-18 @ 22:13), Max: 97.8 (11-05-18 @ 22:13)  HR: 90 (11-06-18 @ 05:04) (77 - 94)  BP: 127/61 (11-06-18 @ 05:04) (120/56 - 152/66)  RR:  (14 - 22)  SpO2:  (98% - 100%)  Wt(kg): --  GENERAL: NAD,   HEENT:   Normocephalic, moist mucous membranes  THYROID: thyroid enlarged b/l  RESPIRATORY: Clear to auscultation bilaterally; No rales, rhonchi, wheezing  CARDIOVASCULAR: Regular rate and rhythm; No murmurs; no peripheral edema  GI: Soft, nontender, non distended  SKIN: Dry, intact, No rashes or lesions  MUSCULOSKELETAL: Full range of motion, normal strength  NEURO: extraocular movements intact, no tremor  PSYCH: Alert and oriented x 3, normal affect, normal mood      CAPILLARY BLOOD GLUCOSE          11-06    140  |  104  |  13  ----------------------------<  97  4.0   |  24  |  0.56    EGFR if : 131  EGFR if non : 113    Ca    9.6      11-06    TPro  8.6<H>  /  Alb  3.8  /  TBili  0.6  /  DBili  x   /  AST  21  /  ALT  15  /  AlkPhos  128<H>  11-04          Thyroid Function Tests:  11-06 @ 06:30 TSH < 0.10 FreeT4 4.66 T3 -- Anti TPO -- Anti Thyroglobulin Ab -- TSI --  11-04 @ 20:50 TSH < 0.10 FreeT4 5.57 T3 413.5 Anti TPO -- Anti Thyroglobulin Ab -- TSI --      Hemoglobin A1C, Whole Blood: 5.0 % [4.0 - 5.6] (11-05-18 @ 06:50)    < from: US Thyroid + Parathyroid (11.06.18 @ 10:43) >  FINDINGS:    Right Lobe: 4.5 x 2.3 x 1.9 cm. Heterogeneous without discrete nodule.    Left Lobe: 4.5 x 2.2 x 1.5 cm. Heterogeneous with 9 x 7 x 9 mm upper pole   echogenic nodule.    Isthmus: 3 mm. Within normal limits.    Cervical Lymph Nodes: No enlarged or abnormal morphology cervical nodes.    < end of copied text > chief complains: 59 yo M presented to ED c/o witnessed fall and memory loss    Overnight events : Pt was seen and examined at bedside, no new overnight events, still has b/l hand tremors, no change. Denies palpitation, diaphoresis. US thyroid noted diffusely heterogeneous gland with 9 mm left upper pole echogenic nodule.    History:      58 M PMH of Asthma, HTN, hyperthyroidism, sleep apnea, GERD presented to ED c/o fall s/p mechanical fall vs syncope. Pt also c/o fatigue, hands tremor, sob, dyspnea on exertion, anxiety/ depression, wt loss 70lbs (From 270lbs to 200lbs since 11/2017) with loss of appetite. Positive family hx of hyperthyroidism. Endo consulted for hyperthyroidism.      MEDICATIONS  (STANDING):  ATENolol  Tablet 50 milliGRAM(s) Oral daily  buDESOnide  80 MICROgram(s)/formoterol 4.5 MICROgram(s) Inhaler 2 Puff(s) Inhalation two times a day  folic acid 1 milliGRAM(s) Oral daily  heparin  Injectable 5000 Unit(s) SubCutaneous every 8 hours  lisinopril 20 milliGRAM(s) Oral daily  methimazole 30 milliGRAM(s) Oral daily  pantoprazole    Tablet 40 milliGRAM(s) Oral before breakfast    MEDICATIONS  (PRN):  acetaminophen   Tablet .. 650 milliGRAM(s) Oral every 6 hours PRN Mild Pain (1 - 3), Moderate Pain (4 - 6)  ALBUTerol    90 MICROgram(s) HFA Inhaler 1 Puff(s) Inhalation every 4 hours PRN Shortness of Breath and/or Wheezing      Allergies    No Known Allergies    Intolerances      Review of Systems:  Constitutional: No fever  Eyes: No blurry vision  Neuro: No tremors  HEENT: No pain  Cardiovascular: No chest pain, palpitations  Respiratory: No SOB, no cough  GI: No nausea, vomiting, abdominal pain  : No dysuria  Skin: no rash  Psych: no depression  Endocrine: no polyuria, polydipsia  Hem/lymph: no swelling  Osteoporosis: no fractures    ALL OTHER SYSTEMS REVIEWED AND NEGATIVE      PHYSICAL EXAM:  VITALS: T(C): 36.6 (11-05-18 @ 22:13)  T(F): 97.8 (11-05-18 @ 22:13), Max: 97.8 (11-05-18 @ 22:13)  HR: 90 (11-06-18 @ 05:04) (77 - 94)  BP: 127/61 (11-06-18 @ 05:04) (120/56 - 152/66)  RR:  (14 - 22)  SpO2:  (98% - 100%)  Wt(kg): --  GENERAL: NAD,   HEENT:   Normocephalic, moist mucous membranes  THYROID: thyroid enlarged b/l  RESPIRATORY: Clear to auscultation bilaterally; No rales, rhonchi, wheezing  CARDIOVASCULAR: Regular rate and rhythm; No murmurs; no peripheral edema  GI: Soft, nontender, non distended  SKIN: Dry, intact, No rashes or lesions  MUSCULOSKELETAL: Full range of motion, normal strength  NEURO: extraocular movements intact, no tremor  PSYCH: Alert and oriented x 3, normal affect, normal mood      CAPILLARY BLOOD GLUCOSE          11-06    140  |  104  |  13  ----------------------------<  97  4.0   |  24  |  0.56    EGFR if : 131  EGFR if non : 113    Ca    9.6      11-06    TPro  8.6<H>  /  Alb  3.8  /  TBili  0.6  /  DBili  x   /  AST  21  /  ALT  15  /  AlkPhos  128<H>  11-04          Thyroid Function Tests:  11-06 @ 06:30 TSH < 0.10 FreeT4 4.66 T3 -- Anti TPO -- Anti Thyroglobulin Ab -- TSI --  11-04 @ 20:50 TSH < 0.10 FreeT4 5.57 T3 413.5 Anti TPO -- Anti Thyroglobulin Ab -- TSI --      Hemoglobin A1C, Whole Blood: 5.0 % [4.0 - 5.6] (11-05-18 @ 06:50)    < from: US Thyroid + Parathyroid (11.06.18 @ 10:43) >  FINDINGS:    Right Lobe: 4.5 x 2.3 x 1.9 cm. Heterogeneous without discrete nodule.    Left Lobe: 4.5 x 2.2 x 1.5 cm. Heterogeneous with 9 x 7 x 9 mm upper pole   echogenic nodule.    Isthmus: 3 mm. Within normal limits.    Cervical Lymph Nodes: No enlarged or abnormal morphology cervical nodes.    < end of copied text >

## 2018-11-06 NOTE — DISCHARGE NOTE ADULT - CARE PROVIDER_API CALL
Omar Merrill), EndocrinologyMetabDiabetes; Internal Medicine  [address] [place] [address]  Phone: [phone]  Fax: [phone]

## 2018-11-06 NOTE — DISCHARGE NOTE ADULT - PLAN OF CARE
Improvement in thyroid fucntion Your fall was likely related to your overactive thyroid. Please call to make an appointment with the Endocrinology office, information below for Dr. Merrill, you may call her office and see any of the endocrinologists there.  Please take Methimazole 20mg daily as well as atenolol 50mg daily. Thyroid uptake scan may be needed as an outpatient.

## 2018-11-06 NOTE — PROGRESS NOTE ADULT - ASSESSMENT
57 yo M presented to ED c/o witnessed fall and memory loss. Pt was found has marked elevated free T4 and T3, suppression TSH in ED. Endo consulted for hyperthyroidism.

## 2018-11-06 NOTE — DISCHARGE NOTE ADULT - CARE PLAN
Principal Discharge DX:	Hyperthyroidism  Goal:	Improvement in thyroid fucntion  Assessment and plan of treatment:	Your fall was likely related to your overactive thyroid. Please call to make an appointment with the Endocrinology office, information below for Dr. Merrill, you may call her office and see any of the endocrinologists there.  Please take Methimazole 20mg daily as well as atenolol 50mg daily. Thyroid uptake scan may be needed as an outpatient.

## 2018-11-06 NOTE — PROGRESS NOTE ADULT - PROBLEM SELECTOR PLAN 1
most likely Graves disease, could be toxic adenoma or toxic multinodular goiter  TSH <0.1, free T4 5.57, total T3 413  f/u TPO Ab , TRAb, TSI, TG Ab  c/w methimazole 30mg daily and atenolol to 50mg daily   f/u US thyroid  pt may need nuclear thyroid scan, can f/u at outpatient likely due to toxic adenoma vs Grave disease   TSH <0.1, free T4 5.57-->4.66  f/u TPO Ab , TRAb, TSI, TG Ab  c/w methimazole 30mg daily and atenolol to 50mg daily   US thyroid noted diffusely heterogeneous gland with 9 mm left upper pole echogenic nodule  pt can f/u at outpatient for nuclear thyroid scan likely due to toxic adenoma vs Grave disease   TSH <0.1, free T4 5.57-->4.66  f/u TPO Ab , TRAb, TSI, TG Ab  c/w methimazole 30mg daily and atenolol to 50mg daily   US thyroid noted diffusely heterogeneous gland with 9 mm left upper pole echogenic nodule  pt can f/u at outpatient for nuclear thyroid scan  will d/c on methimazole 20mg daily and atenolol 50mg daily likely due to toxic adenoma vs Grave disease   TSH <0.1, free T4 5.57-->4.66  f/u TPO Ab , TRAb, TSI, TG Ab  US thyroid noted diffusely heterogeneous gland with 9 mm left upper pole echogenic nodule  pt can f/u with endocrinology [phone].  will d/c home today on methimazole 20mg daily and atenolol 50mg daily

## 2018-11-06 NOTE — DISCHARGE NOTE ADULT - MEDICATION SUMMARY - MEDICATIONS TO TAKE
I will START or STAY ON the medications listed below when I get home from the hospital:    lisinopril 20 mg oral tablet  -- 1 tab(s) by mouth once a day  -- Indication: For HTN (hypertension)    Tapazole 10 mg oral tablet  -- 2 tab(s) by mouth once a day  -- Indication: For Hyperthyroidism    atenolol 50 mg oral tablet  -- 1 tab(s) by mouth once a day  -- Indication: For Hyperthyroidism    Symbicort 80 mcg-4.5 mcg/inh inhalation aerosol  -- 2 puff(s) inhaled 2 times a day  -- Indication: For Asthma    Ventolin  -- 1 puff(s) by mouth every 4 hours  -- Indication: For Asthma    omeprazole 20 mg oral delayed release tablet  -- 1 tab(s) by mouth once a day  -- Indication: For GERD (gastroesophageal reflux disease)    folic acid 0.4 mg oral tablet  -- 1 tab(s) by mouth once a day  -- Indication: For Supplement

## 2018-11-06 NOTE — PATIENT PROFILE ADULT - NSPROEDALEARNPREF_GEN_A_NUR
group instruction/pictorial/verbal instruction/video/written material/computer/internet/individual instruction/skill demonstration

## 2018-11-08 LAB
TSH RECEP AB FLD-ACNC: 5.61 IU/L — HIGH (ref 0–1.75)
TSI ACT/NOR SER: 1.44 IU/L — HIGH (ref 0–0.55)

## 2023-05-30 NOTE — ED PROVIDER NOTE - ATTESTATION, MLM
I have reviewed and confirmed nurses' notes for patient's medications, allergies, medical history, and surgical history. Rituxan Counseling:  I discussed with the patient the risks of Rituxan infusions. Side effects can include infusion reactions, severe drug rashes including mucocutaneous reactions, reactivation of latent hepatitis and other infections and rarely progressive multifocal leukoencephalopathy.  All of the patient's questions and concerns were addressed.

## 2024-05-17 ENCOUNTER — INPATIENT (INPATIENT)
Facility: HOSPITAL | Age: 64
LOS: 13 days | Discharge: HOME | End: 2024-05-31
Attending: SURGERY | Admitting: SURGERY
Payer: MEDICAID

## 2024-05-17 VITALS
TEMPERATURE: 97 F | OXYGEN SATURATION: 98 % | SYSTOLIC BLOOD PRESSURE: 129 MMHG | DIASTOLIC BLOOD PRESSURE: 82 MMHG | WEIGHT: 220.02 LBS | HEART RATE: 82 BPM | RESPIRATION RATE: 24 BRPM

## 2024-05-17 DIAGNOSIS — K86.89 OTHER SPECIFIED DISEASES OF PANCREAS: ICD-10-CM

## 2024-05-17 DIAGNOSIS — Z90.49 ACQUIRED ABSENCE OF OTHER SPECIFIED PARTS OF DIGESTIVE TRACT: Chronic | ICD-10-CM

## 2024-05-17 PROBLEM — K21.9 GASTRO-ESOPHAGEAL REFLUX DISEASE WITHOUT ESOPHAGITIS: Chronic | Status: ACTIVE | Noted: 2018-11-05

## 2024-05-17 PROBLEM — G47.30 SLEEP APNEA, UNSPECIFIED: Chronic | Status: ACTIVE | Noted: 2018-11-05

## 2024-05-17 PROBLEM — J45.909 UNSPECIFIED ASTHMA, UNCOMPLICATED: Chronic | Status: ACTIVE | Noted: 2018-11-04

## 2024-05-17 PROBLEM — I10 ESSENTIAL (PRIMARY) HYPERTENSION: Chronic | Status: ACTIVE | Noted: 2018-11-05

## 2024-05-17 LAB
ADD ON TEST-SPECIMEN IN LAB: SIGNIFICANT CHANGE UP
ALBUMIN SERPL ELPH-MCNC: 3.3 G/DL — SIGNIFICANT CHANGE UP (ref 3.3–5)
ALP SERPL-CCNC: 532 U/L — HIGH (ref 40–120)
ALT FLD-CCNC: 38 U/L — SIGNIFICANT CHANGE UP (ref 4–41)
ANION GAP SERPL CALC-SCNC: 18 MMOL/L — HIGH (ref 7–14)
APPEARANCE UR: CLEAR — SIGNIFICANT CHANGE UP
AST SERPL-CCNC: 71 U/L — HIGH (ref 4–40)
B-OH-BUTYR SERPL-SCNC: <0 MMOL/L — SIGNIFICANT CHANGE UP (ref 0–0.4)
BACTERIA # UR AUTO: NEGATIVE /HPF — SIGNIFICANT CHANGE UP
BASE EXCESS BLDV CALC-SCNC: -2 MMOL/L — SIGNIFICANT CHANGE UP (ref -2–3)
BASOPHILS # BLD AUTO: 0.04 K/UL — SIGNIFICANT CHANGE UP (ref 0–0.2)
BASOPHILS NFR BLD AUTO: 0.4 % — SIGNIFICANT CHANGE UP (ref 0–2)
BILIRUB DIRECT SERPL-MCNC: >20 MG/DL — HIGH (ref 0–0.3)
BILIRUB INDIRECT FLD-MCNC: <9.9 MG/DL — HIGH (ref 0–1)
BILIRUB SERPL-MCNC: 29.5 MG/DL — HIGH (ref 0.2–1.2)
BILIRUB SERPL-MCNC: 29.9 MG/DL — HIGH (ref 0.2–1.2)
BILIRUB UR-MCNC: ABNORMAL
BLOOD GAS VENOUS COMPREHENSIVE RESULT: SIGNIFICANT CHANGE UP
BUN SERPL-MCNC: 21 MG/DL — SIGNIFICANT CHANGE UP (ref 7–23)
CALCIUM SERPL-MCNC: 9.5 MG/DL — SIGNIFICANT CHANGE UP (ref 8.4–10.5)
CAST: 2 /LPF — SIGNIFICANT CHANGE UP (ref 0–4)
CHLORIDE BLDV-SCNC: 95 MMOL/L — LOW (ref 96–108)
CHLORIDE SERPL-SCNC: 93 MMOL/L — LOW (ref 98–107)
CO2 BLDV-SCNC: 25 MMOL/L — SIGNIFICANT CHANGE UP (ref 22–26)
CO2 SERPL-SCNC: 19 MMOL/L — LOW (ref 22–31)
COLOR SPEC: SIGNIFICANT CHANGE UP
CREAT SERPL-MCNC: 0.86 MG/DL — SIGNIFICANT CHANGE UP (ref 0.5–1.3)
DIFF PNL FLD: ABNORMAL
EGFR: 97 ML/MIN/1.73M2 — SIGNIFICANT CHANGE UP
EOSINOPHIL # BLD AUTO: 0.09 K/UL — SIGNIFICANT CHANGE UP (ref 0–0.5)
EOSINOPHIL NFR BLD AUTO: 0.8 % — SIGNIFICANT CHANGE UP (ref 0–6)
GAS PNL BLDV: 126 MMOL/L — LOW (ref 136–145)
GAS PNL BLDV: SIGNIFICANT CHANGE UP
GLUCOSE BLDC GLUCOMTR-MCNC: 214 MG/DL — HIGH (ref 70–99)
GLUCOSE BLDV-MCNC: 418 MG/DL — HIGH (ref 70–99)
GLUCOSE SERPL-MCNC: 364 MG/DL — HIGH (ref 70–99)
GLUCOSE UR QL: >=1000 MG/DL
HCO3 BLDV-SCNC: 24 MMOL/L — SIGNIFICANT CHANGE UP (ref 22–29)
HCT VFR BLD CALC: 39.5 % — SIGNIFICANT CHANGE UP (ref 39–50)
HCT VFR BLDA CALC: 43 % — SIGNIFICANT CHANGE UP (ref 39–51)
HGB BLD CALC-MCNC: 14.2 G/DL — SIGNIFICANT CHANGE UP (ref 12.6–17.4)
HGB BLD-MCNC: 14.1 G/DL — SIGNIFICANT CHANGE UP (ref 13–17)
IANC: 8.71 K/UL — HIGH (ref 1.8–7.4)
IMM GRANULOCYTES NFR BLD AUTO: 1 % — HIGH (ref 0–0.9)
KETONES UR-MCNC: NEGATIVE MG/DL — SIGNIFICANT CHANGE UP
LACTATE BLDV-MCNC: 3.1 MMOL/L — HIGH (ref 0.5–2)
LEUKOCYTE ESTERASE UR-ACNC: ABNORMAL
LIDOCAIN IGE QN: 26 U/L — SIGNIFICANT CHANGE UP (ref 7–60)
LYMPHOCYTES # BLD AUTO: 1.34 K/UL — SIGNIFICANT CHANGE UP (ref 1–3.3)
LYMPHOCYTES # BLD AUTO: 12.4 % — LOW (ref 13–44)
MCHC RBC-ENTMCNC: 30.9 PG — SIGNIFICANT CHANGE UP (ref 27–34)
MCHC RBC-ENTMCNC: 35.7 GM/DL — SIGNIFICANT CHANGE UP (ref 32–36)
MCV RBC AUTO: 86.4 FL — SIGNIFICANT CHANGE UP (ref 80–100)
MONOCYTES # BLD AUTO: 0.54 K/UL — SIGNIFICANT CHANGE UP (ref 0–0.9)
MONOCYTES NFR BLD AUTO: 5 % — SIGNIFICANT CHANGE UP (ref 2–14)
NEUTROPHILS # BLD AUTO: 8.71 K/UL — HIGH (ref 1.8–7.4)
NEUTROPHILS NFR BLD AUTO: 80.4 % — HIGH (ref 43–77)
NITRITE UR-MCNC: NEGATIVE — SIGNIFICANT CHANGE UP
NRBC # BLD: 0 /100 WBCS — SIGNIFICANT CHANGE UP (ref 0–0)
NRBC # FLD: 0 K/UL — SIGNIFICANT CHANGE UP (ref 0–0)
PCO2 BLDV: 43 MMHG — SIGNIFICANT CHANGE UP (ref 42–55)
PH BLDV: 7.35 — SIGNIFICANT CHANGE UP (ref 7.32–7.43)
PH UR: 6 — SIGNIFICANT CHANGE UP (ref 5–8)
PLATELET # BLD AUTO: 232 K/UL — SIGNIFICANT CHANGE UP (ref 150–400)
PO2 BLDV: <20 MMHG — LOW (ref 25–45)
POTASSIUM BLDV-SCNC: 5.4 MMOL/L — HIGH (ref 3.5–5.1)
POTASSIUM SERPL-MCNC: 4.5 MMOL/L — SIGNIFICANT CHANGE UP (ref 3.5–5.3)
POTASSIUM SERPL-SCNC: 4.5 MMOL/L — SIGNIFICANT CHANGE UP (ref 3.5–5.3)
PROT SERPL-MCNC: 8 G/DL — SIGNIFICANT CHANGE UP (ref 6–8.3)
PROT UR-MCNC: SIGNIFICANT CHANGE UP MG/DL
RBC # BLD: 4.57 M/UL — SIGNIFICANT CHANGE UP (ref 4.2–5.8)
RBC # FLD: 22.5 % — HIGH (ref 10.3–14.5)
RBC CASTS # UR COMP ASSIST: 6 /HPF — HIGH (ref 0–4)
SAO2 % BLDV: 18.8 % — LOW (ref 67–88)
SODIUM SERPL-SCNC: 130 MMOL/L — LOW (ref 135–145)
SP GR SPEC: 1.05 — HIGH (ref 1–1.03)
SQUAMOUS # UR AUTO: 1 /HPF — SIGNIFICANT CHANGE UP (ref 0–5)
TSH SERPL-MCNC: 0.7 UIU/ML — SIGNIFICANT CHANGE UP (ref 0.27–4.2)
UROBILINOGEN FLD QL: 0.2 MG/DL — SIGNIFICANT CHANGE UP (ref 0.2–1)
WBC # BLD: 10.83 K/UL — HIGH (ref 3.8–10.5)
WBC # FLD AUTO: 10.83 K/UL — HIGH (ref 3.8–10.5)
WBC UR QL: 1 /HPF — SIGNIFICANT CHANGE UP (ref 0–5)

## 2024-05-17 PROCEDURE — 99285 EMERGENCY DEPT VISIT HI MDM: CPT

## 2024-05-17 PROCEDURE — 71275 CT ANGIOGRAPHY CHEST: CPT | Mod: 26,MC

## 2024-05-17 PROCEDURE — 99223 1ST HOSP IP/OBS HIGH 75: CPT

## 2024-05-17 PROCEDURE — 74177 CT ABD & PELVIS W/CONTRAST: CPT | Mod: 26,MC

## 2024-05-17 PROCEDURE — 71046 X-RAY EXAM CHEST 2 VIEWS: CPT | Mod: 26

## 2024-05-17 RX ORDER — DEXTROSE 10 % IN WATER 10 %
125 INTRAVENOUS SOLUTION INTRAVENOUS ONCE
Refills: 0 | Status: DISCONTINUED | OUTPATIENT
Start: 2024-05-17 | End: 2024-05-19

## 2024-05-17 RX ORDER — INSULIN LISPRO 100/ML
VIAL (ML) SUBCUTANEOUS AT BEDTIME
Refills: 0 | Status: DISCONTINUED | OUTPATIENT
Start: 2024-05-17 | End: 2024-05-19

## 2024-05-17 RX ORDER — INSULIN GLARGINE 100 [IU]/ML
40 INJECTION, SOLUTION SUBCUTANEOUS ONCE
Refills: 0 | Status: COMPLETED | OUTPATIENT
Start: 2024-05-17 | End: 2024-05-17

## 2024-05-17 RX ORDER — INSULIN GLARGINE 100 [IU]/ML
40 INJECTION, SOLUTION SUBCUTANEOUS AT BEDTIME
Refills: 0 | Status: DISCONTINUED | OUTPATIENT
Start: 2024-05-18 | End: 2024-05-18

## 2024-05-17 RX ORDER — SODIUM CHLORIDE 9 MG/ML
1000 INJECTION, SOLUTION INTRAVENOUS
Refills: 0 | Status: DISCONTINUED | OUTPATIENT
Start: 2024-05-17 | End: 2024-05-19

## 2024-05-17 RX ORDER — DEXTROSE 50 % IN WATER 50 %
12.5 SYRINGE (ML) INTRAVENOUS ONCE
Refills: 0 | Status: DISCONTINUED | OUTPATIENT
Start: 2024-05-17 | End: 2024-05-19

## 2024-05-17 RX ORDER — DEXTROSE 50 % IN WATER 50 %
15 SYRINGE (ML) INTRAVENOUS ONCE
Refills: 0 | Status: DISCONTINUED | OUTPATIENT
Start: 2024-05-17 | End: 2024-05-19

## 2024-05-17 RX ORDER — CHOLESTYRAMINE 4 G/9G
4 POWDER, FOR SUSPENSION ORAL ONCE
Refills: 0 | Status: COMPLETED | OUTPATIENT
Start: 2024-05-17 | End: 2024-05-17

## 2024-05-17 RX ORDER — GLUCAGON INJECTION, SOLUTION 0.5 MG/.1ML
1 INJECTION, SOLUTION SUBCUTANEOUS ONCE
Refills: 0 | Status: DISCONTINUED | OUTPATIENT
Start: 2024-05-17 | End: 2024-05-19

## 2024-05-17 RX ORDER — DIPHENHYDRAMINE HCL 50 MG
50 CAPSULE ORAL ONCE
Refills: 0 | Status: COMPLETED | OUTPATIENT
Start: 2024-05-17 | End: 2024-05-17

## 2024-05-17 RX ORDER — INSULIN LISPRO 100/ML
VIAL (ML) SUBCUTANEOUS
Refills: 0 | Status: DISCONTINUED | OUTPATIENT
Start: 2024-05-17 | End: 2024-05-19

## 2024-05-17 RX ORDER — SODIUM CHLORIDE 9 MG/ML
1000 INJECTION INTRAMUSCULAR; INTRAVENOUS; SUBCUTANEOUS ONCE
Refills: 0 | Status: COMPLETED | OUTPATIENT
Start: 2024-05-17 | End: 2024-05-17

## 2024-05-17 RX ORDER — DEXTROSE 50 % IN WATER 50 %
25 SYRINGE (ML) INTRAVENOUS ONCE
Refills: 0 | Status: DISCONTINUED | OUTPATIENT
Start: 2024-05-17 | End: 2024-05-19

## 2024-05-17 RX ADMIN — SODIUM CHLORIDE 1000 MILLILITER(S): 9 INJECTION INTRAMUSCULAR; INTRAVENOUS; SUBCUTANEOUS at 18:24

## 2024-05-17 RX ADMIN — Medication 50 MILLIGRAM(S): at 21:53

## 2024-05-17 RX ADMIN — CHOLESTYRAMINE 4 GRAM(S): 4 POWDER, FOR SUSPENSION ORAL at 23:44

## 2024-05-17 NOTE — ED ADULT TRIAGE NOTE - CHIEF COMPLAINT QUOTE
Patient c/o abdominal pain, diarrhea and SOB x 1 month. Also reports nausea and vomiting last night. Patient jaundiced. Denies chest pain, denies fever. Appears dyspneic with exertion, not in respiratory distress. Phx DM2, HTN, HLD

## 2024-05-17 NOTE — ED PROVIDER NOTE - NSICDXFAMILYHX_GEN_ALL_CORE_FT
FAMILY HISTORY:  Child  Still living? Unknown  Family history of hyperthyroidism, Age at diagnosis: Age Unknown

## 2024-05-17 NOTE — ED PROVIDER NOTE - PROGRESS NOTE DETAILS
Natasha Lanza MD (PGY3): CT with pancreatic mass. discussed reuslts with patient and offered support. will admit for further work up.

## 2024-05-17 NOTE — ED PROVIDER NOTE - CLINICAL SUMMARY MEDICAL DECISION MAKING FREE TEXT BOX
62 y/o M w/ hx of DM, HTN who presents to the ED with jaundice x 1 week. Also c/o changes in the caliber of his stool over past month, started when he was in [place]. States it seems "frothy". Also c/o non-exterional sob, unintentional weight loss of 20 pounds. Has nbnb emesis last night. Hx of cholecystecomy. no hx of malignancy. No tylenol, ingestions, etoh use. 62 y/o M w/ hx of DM, HTN who presents to the ED with jaundice x 1 week. Also c/o changes in the caliber of his stool over past month, started when he was in [place]. States it seems "frothy". Also c/o non-exterional sob, unintentional weight loss of 20 pounds. Has nbnb emesis last night.Hx of cholecystecomy. no hx of malignancy. No tylenol, ingestions, etoh use. No chest pain, abd pain, urinary or bowel changes. N    CONSTITUTIONAL: No fevers, no chills, no lightheadedness, no dizziness  EYES: no visual changes, no eye pain  EARS: no ear drainage, no ear pain, no change in hearing  NOSE: no nasal congestion  MOUTH/THROAT: no sore throat  CV: No chest pain, no palpitations  RESP: see HPI   GI: see HPI   : no dysuria, no hematuria, no flank pain  MSK: no back pain, no extremity pain  SKIN: no rashes  NEURO: no headache, no focal weakness, no decreased sensation/parasthesias   PSYCHIATRIC: no known mental health issues     General: Alert and Orientated x 3. No apparent distress.  Head: Normocephalic and atraumatic.  Eyes: PERRLA with EOMI. Juandice b/l.   Neck: Supple. Trachea midline.   Cardiac: Normal S1 and S2 w/ RRR. No murmurs appreciated. No JVD appreciated.  Pulmonary: CTA bilaterally. some increased. WOB. No wheezes or crackles.  Abdominal: Soft, mild epigastric ttp. (+) bowel sounds appreciated in all 4 quadrants. No hepatosplenomegaly.   Neurologic: No focal sensory or motor deficits.  Musculoskeletal: Strength appropriate in all 4 extremities for age with no limited ROM.  Skin: Color appropriate for race. Intact, warm, and well-perfused.  Psychiatric: Appropriate mood and affect. No apparent risk to self or others.     MDM: 62 y/o M w/ hx of DM, HTN who presents to the ED with jaundice x 1 week, and 1 month of unintentional weight loss, diarrhea, sob. C/f mass (pancreatitic). Hx of radha however choleducolithiasis also on differential. given recent travel hepatitis also possible. With c/f malinancy and sob, will eval for PE as well. Will get labs, ua, cxr, CTA Chest and CT A/P. Dispo pending labs and imaging,.

## 2024-05-17 NOTE — ED PROVIDER NOTE - NSICDXPASTMEDICALHX_GEN_ALL_CORE_FT
PAST MEDICAL HISTORY:  Asthma     GERD (gastroesophageal reflux disease)     HTN (hypertension)     Sleep apnea

## 2024-05-17 NOTE — ED PROVIDER NOTE - ATTENDING CONTRIBUTION TO CARE
Keanu Brooks DO:  patient seen and evaluated with the resident.  I was present for key portions of the History & Physical, and I agree with the Impression & Plan. 62 yo m pmh DM, HTN, pw malaise. Reports 1 month of symptoms.  [place] so he was unable to seek care.  Reports SOB, even at rest.  Worse with exertion.  Reports diffuse abdominal pain.  Reports yellowing of eyes.  Denies F/C, N/V, cough or congestion.  Patient arrives HDS, ill-appearing however stable.  Does have scleral icterus.  No masses palpated in abdomen.  Concern for possible occult malignancy.  Will check labs, imaging, reassess.  Patient denies drug and alcohol use.

## 2024-05-17 NOTE — ED ADULT NURSE NOTE - OBJECTIVE STATEMENT
Pt received c/o abdominal pain, SOB, and diarrhaea x 1 month. Pt appears jaundiced. Vitally stable at this time. Pt is not reporting SOB at present, but states it has been intermittent x 1 month. Denies chest pain. 20g IV placed in L AC, labs drawn as ordered. Awaiting ct scan. Report endorsed to SHAYAN Llamas.

## 2024-05-17 NOTE — ED ADULT NURSE REASSESSMENT NOTE - NS ED NURSE REASSESS COMMENT FT1
RN handoff provided via telephone to 9S SHAYAN Motley 2305  Pt pending transport to [facility]
Pt received from previous RN on stretcher, A/Ox4 answers all questions appropriately. Pt denies chest pain and SOB during reassessment, breathing is even and unlabored on room air. Abdomen is soft and non-distended, non-tender to touch. Pt ambulates independently at baseline, moves all four extremities on command, skin is intact. Pt resting comfortably at the bedside, no visible apparent acute distress noted on reassessment. Vital signs stable, NKA to medications. Pending admission

## 2024-05-18 DIAGNOSIS — E11.9 TYPE 2 DIABETES MELLITUS WITHOUT COMPLICATIONS: ICD-10-CM

## 2024-05-18 DIAGNOSIS — Z29.9 ENCOUNTER FOR PROPHYLACTIC MEASURES, UNSPECIFIED: ICD-10-CM

## 2024-05-18 DIAGNOSIS — G47.33 OBSTRUCTIVE SLEEP APNEA (ADULT) (PEDIATRIC): ICD-10-CM

## 2024-05-18 DIAGNOSIS — E05.90 THYROTOXICOSIS, UNSPECIFIED WITHOUT THYROTOXIC CRISIS OR STORM: ICD-10-CM

## 2024-05-18 DIAGNOSIS — R17 UNSPECIFIED JAUNDICE: ICD-10-CM

## 2024-05-18 DIAGNOSIS — I10 ESSENTIAL (PRIMARY) HYPERTENSION: ICD-10-CM

## 2024-05-18 DIAGNOSIS — E87.29 OTHER ACIDOSIS: ICD-10-CM

## 2024-05-18 DIAGNOSIS — D72.829 ELEVATED WHITE BLOOD CELL COUNT, UNSPECIFIED: ICD-10-CM

## 2024-05-18 DIAGNOSIS — E78.5 HYPERLIPIDEMIA, UNSPECIFIED: ICD-10-CM

## 2024-05-18 DIAGNOSIS — J45.909 UNSPECIFIED ASTHMA, UNCOMPLICATED: ICD-10-CM

## 2024-05-18 DIAGNOSIS — K21.9 GASTRO-ESOPHAGEAL REFLUX DISEASE WITHOUT ESOPHAGITIS: ICD-10-CM

## 2024-05-18 DIAGNOSIS — K86.89 OTHER SPECIFIED DISEASES OF PANCREAS: ICD-10-CM

## 2024-05-18 LAB
A1C WITH ESTIMATED AVERAGE GLUCOSE RESULT: 9 % — HIGH (ref 4–5.6)
ESTIMATED AVERAGE GLUCOSE: 212 — SIGNIFICANT CHANGE UP
GLUCOSE BLDC GLUCOMTR-MCNC: 133 MG/DL — HIGH (ref 70–99)
GLUCOSE BLDC GLUCOMTR-MCNC: 155 MG/DL — HIGH (ref 70–99)
GLUCOSE BLDC GLUCOMTR-MCNC: 187 MG/DL — HIGH (ref 70–99)
GLUCOSE BLDC GLUCOMTR-MCNC: 209 MG/DL — HIGH (ref 70–99)
GLUCOSE BLDC GLUCOMTR-MCNC: 304 MG/DL — HIGH (ref 70–99)
HAV IGM SER-ACNC: SIGNIFICANT CHANGE UP
HBV CORE IGM SER-ACNC: SIGNIFICANT CHANGE UP
HBV SURFACE AG SER-ACNC: SIGNIFICANT CHANGE UP
HCV AB S/CO SERPL IA: 0.15 S/CO — SIGNIFICANT CHANGE UP (ref 0–0.99)
HCV AB SERPL-IMP: SIGNIFICANT CHANGE UP
MRSA PCR RESULT.: SIGNIFICANT CHANGE UP
S AUREUS DNA NOSE QL NAA+PROBE: SIGNIFICANT CHANGE UP

## 2024-05-18 PROCEDURE — 99233 SBSQ HOSP IP/OBS HIGH 50: CPT | Mod: GC

## 2024-05-18 PROCEDURE — 99232 SBSQ HOSP IP/OBS MODERATE 35: CPT | Mod: GC

## 2024-05-18 RX ORDER — IPRATROPIUM/ALBUTEROL SULFATE 18-103MCG
3 AEROSOL WITH ADAPTER (GRAM) INHALATION EVERY 6 HOURS
Refills: 0 | Status: DISCONTINUED | OUTPATIENT
Start: 2024-05-18 | End: 2024-05-31

## 2024-05-18 RX ORDER — CHOLESTYRAMINE 4 G/9G
4 POWDER, FOR SUSPENSION ORAL DAILY
Refills: 0 | Status: DISCONTINUED | OUTPATIENT
Start: 2024-05-18 | End: 2024-05-31

## 2024-05-18 RX ORDER — FOLIC ACID 0.8 MG
1 TABLET ORAL
Qty: 0 | Refills: 0 | DISCHARGE

## 2024-05-18 RX ORDER — INSULIN GLARGINE 100 [IU]/ML
40 INJECTION, SOLUTION SUBCUTANEOUS AT BEDTIME
Refills: 0 | Status: DISCONTINUED | OUTPATIENT
Start: 2024-05-18 | End: 2024-05-19

## 2024-05-18 RX ORDER — ATENOLOL 25 MG/1
50 TABLET ORAL DAILY
Refills: 0 | Status: DISCONTINUED | OUTPATIENT
Start: 2024-05-18 | End: 2024-05-31

## 2024-05-18 RX ORDER — ACETAMINOPHEN 500 MG
650 TABLET ORAL EVERY 6 HOURS
Refills: 0 | Status: DISCONTINUED | OUTPATIENT
Start: 2024-05-18 | End: 2024-05-31

## 2024-05-18 RX ORDER — OMEPRAZOLE 10 MG/1
1 CAPSULE, DELAYED RELEASE ORAL
Qty: 0 | Refills: 0 | DISCHARGE

## 2024-05-18 RX ORDER — ATORVASTATIN CALCIUM 80 MG/1
40 TABLET, FILM COATED ORAL AT BEDTIME
Refills: 0 | Status: DISCONTINUED | OUTPATIENT
Start: 2024-05-18 | End: 2024-05-24

## 2024-05-18 RX ORDER — ALBUTEROL 90 UG/1
1 AEROSOL, METERED ORAL
Qty: 0 | Refills: 0 | DISCHARGE

## 2024-05-18 RX ORDER — ENOXAPARIN SODIUM 100 MG/ML
40 INJECTION SUBCUTANEOUS EVERY 24 HOURS
Refills: 0 | Status: DISCONTINUED | OUTPATIENT
Start: 2024-05-18 | End: 2024-05-20

## 2024-05-18 RX ORDER — FAMOTIDINE 10 MG/ML
1 INJECTION INTRAVENOUS
Refills: 0 | DISCHARGE

## 2024-05-18 RX ORDER — HYDROXYZINE HCL 10 MG
25 TABLET ORAL EVERY 8 HOURS
Refills: 0 | Status: DISCONTINUED | OUTPATIENT
Start: 2024-05-18 | End: 2024-05-31

## 2024-05-18 RX ORDER — FAMOTIDINE 10 MG/ML
40 INJECTION INTRAVENOUS DAILY
Refills: 0 | Status: DISCONTINUED | OUTPATIENT
Start: 2024-05-18 | End: 2024-05-31

## 2024-05-18 RX ORDER — URSODIOL 250 MG/1
250 TABLET, FILM COATED ORAL
Refills: 0 | Status: DISCONTINUED | OUTPATIENT
Start: 2024-05-18 | End: 2024-05-31

## 2024-05-18 RX ORDER — BUDESONIDE AND FORMOTEROL FUMARATE DIHYDRATE 160; 4.5 UG/1; UG/1
2 AEROSOL RESPIRATORY (INHALATION)
Qty: 0 | Refills: 0 | DISCHARGE

## 2024-05-18 RX ADMIN — Medication 25 MILLIGRAM(S): at 04:31

## 2024-05-18 RX ADMIN — CHOLESTYRAMINE 4 GRAM(S): 4 POWDER, FOR SUSPENSION ORAL at 09:57

## 2024-05-18 RX ADMIN — Medication 2: at 12:55

## 2024-05-18 RX ADMIN — FAMOTIDINE 40 MILLIGRAM(S): 10 INJECTION INTRAVENOUS at 12:57

## 2024-05-18 RX ADMIN — Medication 25 MILLIGRAM(S): at 18:24

## 2024-05-18 RX ADMIN — Medication 2: at 18:10

## 2024-05-18 RX ADMIN — ATORVASTATIN CALCIUM 40 MILLIGRAM(S): 80 TABLET, FILM COATED ORAL at 22:32

## 2024-05-18 RX ADMIN — INSULIN GLARGINE 40 UNIT(S): 100 INJECTION, SOLUTION SUBCUTANEOUS at 22:32

## 2024-05-18 RX ADMIN — INSULIN GLARGINE 40 UNIT(S): 100 INJECTION, SOLUTION SUBCUTANEOUS at 01:31

## 2024-05-18 NOTE — H&P ADULT - PROBLEM SELECTOR PLAN 7
Pt states that he now takes Lantus 50 u qHS and Admelog 50 u qAM before breakfast at home.  - C/w Lantus but at lower dose of 40 u qHS. Adjust dose as needed.  - Start moderate-dose ISS and FS checks qAC TID and qHS  - Check A1c Pt states that he now takes Lantus 50 u qHS and Admelog 50 u qAM before breakfast at home.  - C/w Lantus but at lower dose of 40 u qHS. Adjust dose as needed.  - Start moderate-dose ISS and FS checks qAC TID and qHS  - Check A1c  - Corrected serum Na for hyperglycemia 136, monitor serum Na

## 2024-05-18 NOTE — CONSULT NOTE ADULT - SUBJECTIVE AND OBJECTIVE BOX
Chief Complaint:  Patient is a 63y old  Male who presents with a chief complaint of Jaundice, pancreatic mass (18 May 2024 07:34)      HPI:  63M with history of HTN, HLD, type 2 DM (on insulin), hyperthyroidism, asthma, GERD, and RAMANA (not on CPAP) presents with 1 week of painless jaundice. Pt states that he started noticing yellowing of his eyes, then his skin ~1 weeks ago, associated with diffuse pruritus. Pt also has been having a 25-lb weight loss and dark urine for the past 1 month. Pt denies any fever, chills, abd pain, nausea, vomiting. No significant NSAID use history.    Allergies:  No Known Allergies      Home Medications:    Hospital Medications:  acetaminophen     Tablet .. 650 milliGRAM(s) Oral every 6 hours PRN  albuterol/ipratropium for Nebulization 3 milliLiter(s) Nebulizer every 6 hours PRN  atenolol  Tablet 50 milliGRAM(s) Oral daily  atorvastatin 40 milliGRAM(s) Oral at bedtime  cholestyramine Powder (Sugar-Free) 4 Gram(s) Oral daily  dextrose 10% Bolus 125 milliLiter(s) IV Bolus once  dextrose 5%. 1000 milliLiter(s) IV Continuous <Continuous>  dextrose 5%. 1000 milliLiter(s) IV Continuous <Continuous>  dextrose 50% Injectable 12.5 Gram(s) IV Push once  dextrose 50% Injectable 25 Gram(s) IV Push once  dextrose Oral Gel 15 Gram(s) Oral once PRN  enoxaparin Injectable 40 milliGRAM(s) SubCutaneous every 24 hours  famotidine    Tablet 40 milliGRAM(s) Oral daily  glucagon  Injectable 1 milliGRAM(s) IntraMuscular once  hydrOXYzine hydrochloride 25 milliGRAM(s) Oral every 8 hours PRN  insulin glargine Injectable (LANTUS) 40 Unit(s) SubCutaneous at bedtime  insulin lispro (ADMELOG) corrective regimen sliding scale   SubCutaneous three times a day before meals  insulin lispro (ADMELOG) corrective regimen sliding scale   SubCutaneous at bedtime      PMHX/PSHX:  Asthma    HTN (hypertension)    GERD (gastroesophageal reflux disease)    Sleep apnea    Hyperlipidemia    Type 2 diabetes mellitus    History of cholecystectomy    Hyperthyroidism    History of appendectomy    Family history:  Family history of hyperthyroidism (Child)    ROS:  General:  (+) wt loss, No fevers, chills  ENT:  No dysphagia  CV:  No pain, palpitations  Pulm:  No dyspnea, cough  GI:  No pain, No nausea, No vomiting, No diarrhea, No constipation, No rectal bleeding, No tarry stools, No dysphagia,  Muscle:  No pain, weakness  Neuro:  No weakness  Heme:  No ecchymosis  Skin:  No rash    PHYSICAL EXAM:     GENERAL:  No acute distress  HEENT:  (+) scleral icterus  CHEST:  no accessory muscle use  HEART:  Regular rate and rhythm  ABDOMEN:  Soft, non-tender, non-distended  EXTREMITIES: No edema  SKIN:  No rash/ecchymoses  NEURO:  Alert and oriented x 3    Vital Signs:  Vital Signs Last 24 Hrs  T(C): 36.3 (18 May 2024 05:01), Max: 36.4 (18 May 2024 00:05)  T(F): 97.3 (18 May 2024 05:01), Max: 97.6 (18 May 2024 00:05)  HR: 82 (18 May 2024 05:01) (75 - 87)  BP: 132/76 (18 May 2024 05:01) (129/82 - 146/86)  BP(mean): 107 (17 May 2024 20:45) (100 - 107)  RR: 18 (18 May 2024 05:01) (17 - 24)  SpO2: 98% (18 May 2024 05:01) (98% - 100%)    Parameters below as of 18 May 2024 05:01  Patient On (Oxygen Delivery Method): room air      Daily Height in cm: 177.8 (18 May 2024 05:01)    Daily     LABS:                        14.1   10.83 )-----------( 232      ( 17 May 2024 16:22 )             39.5     Mean Cell Volume: 86.4 fL (-24 @ 16:22)        130<L>  |  93<L>  |  21  ----------------------------<  364<H>  4.5   |  19<L>  |  0.86    Ca    9.5      17 May 2024 16:22    TPro  8.0  /  Alb  3.3  /  TBili  29.5<H>  /  DBili  >20.0<H>  /  AST  71<H>  /  ALT  38  /  AlkPhos  532<H>  05-17    LIVER FUNCTIONS - ( 17 May 2024 16:22 )  Alb: 3.3 g/dL / Pro: 8.0 g/dL / ALK PHOS: 532 U/L / ALT: 38 U/L / AST: 71 U/L / GGT: x             Urinalysis Basic - ( 17 May 2024 20:35 )    Color: Dark Yellow / Appearance: Clear / S.054 / pH: x  Gluc: x / Ketone: Negative mg/dL  / Bili: Large / Urobili: 0.2 mg/dL   Blood: x / Protein: Trace mg/dL / Nitrite: Negative   Leuk Esterase: Trace / RBC: 6 /HPF / WBC 1 /HPF   Sq Epi: x / Non Sq Epi: 1 /HPF / Bacteria: Negative /HPF      Amylase Serum--      Lipase serum26       Ammonia--                          14.1   10.83 )-----------( 232      ( 17 May 2024 16:22 )             39.5       Imaging:    FINDINGS:  CHEST:  LUNGS AND LARGE AIRWAYS: No endobronchial lesions. The lungs are clear.   No suspicious pulmonary nodules.  PLEURA: No pleural effusion.  VESSELS: No pulmonary embolism. Atherosclerotic changes of the coronary   arteries and aorta.  HEART: Heart size is normal. No pericardial effusion.  MEDIASTINUM AND DAR: No lymphadenopathy.  CHEST WALL AND LOWER NECK: Within normal limits.    ABDOMEN AND PELVIS:  LIVER: Within normal limits.  BILE DUCTS: Extrahepatic and intrahepatic biliary ductal dilatation.The   common bile duct measures 2.2 cm with abrupt narrowing to the level of   the pancreatic mass.  GALLBLADDER: Cholecystectomy.  SPLEEN: Within normal limits.  PANCREAS: A 3.5 x 4.5 x 3.0 cm heterogenous solid mass in the pancreatic   head/uncinate process (303-52 and 604-70) with upstream main pancreatic   ductal dilatation. The mass abuts the superior mesenteric vein and second   portion of the duodenum. Mass also contacts the proximal SMA along the   left than 180 degrees of its circumference.  ADRENALS: Within normal limits.  KIDNEYS/URETERS: No hydronephrosis. Right renal cysts and subcentimeter   hypodensity too small to characterize.    BLADDER: Mild circumferential wall thickening.  REPRODUCTIVE ORGANS: Prostate within normal limits.    BOWEL: No bowel obstruction. Appendix is not visualized. No evidence of   inflammation in the pericecal region.  PERITONEUM: No ascites.  VESSELS: Atherosclerotic changes.  RETROPERITONEUM/LYMPH NODES: No lymphadenopathy.  ABDOMINAL WALL: Small bilateral fat-containing inguinal hernias.  BONES: Degenerative changes.

## 2024-05-18 NOTE — PROGRESS NOTE ADULT - PROBLEM SELECTOR PLAN 7
Pt states that he now takes Lantus 50 u qHS and Admelog 50 u qAM before breakfast at home.  - C/w Lantus but at lower dose of 40 u qHS. Adjust dose as needed.  - Start moderate-dose ISS and FS checks qAC TID and qHS  - Check A1c

## 2024-05-18 NOTE — CONSULT NOTE ADULT - ASSESSMENT
63M with history of HTN, HLD, type 2 DM (on insulin), hyperthyroidism, asthma, GERD, and RAMANA (not on CPAP) presents with 1 week of painless jaundice    Impression  #obstructive jaundice 2/2 pancreatic head/uncinate mass  #weight loss; 25lbs over past 1 month  - afebrile; no leukocytosis; abd exam benign without tenderness of distension  - CT showing 3.5 x 4.5 x 3.0 cm heterogenous solid mass in the pancreatic head/uncinate process with upstream main PD dilatation. Mass abuts the SMV and second portion of the duodenum. Mass also contacts the proximal SMA; extrahepatic/intrahepatic biliary dilation with CBD 2.2cm with abrupt narrowing to level of the pancreatic mass    Recommendations  - plan for EGD/EUS/ERCP Monday; or sooner if clinically indicated  - NPO after midnight Sunday for procedure Monday  - trend CBC, CMP  - monitor for fevers; if present, start empiric antibiotics  - monitor hemodynamics; if unstable, please reach out to GI team    Note and recommendations are incomplete until reviewed and attested by attending.    Juan Rojas MD  GI/Hepatology Fellow, PGY4  [employer] Pager [phone] or [email or web address] Pager [phone]  Teams preferred (7AM to 5PM); after 5PM, call GI fellow on call    On Weekends/Holidays (All Day) and Weekdays after 5PM to 8AM  For non-urgent consults, please email [email or web address] and [email or web address]  For urgent consults, please contact on call GI team. See Amion schedule ([facility]), [facility] paging system ([facility]), or call hospital  ([facility]) 63M with history of HTN, HLD, type 2 DM (on insulin), hyperthyroidism, asthma, GERD, and RAMANA (not on CPAP) presents with 1 week of painless jaundice    Impression  #obstructive jaundice 2/2 pancreatic head/uncinate mass  #weight loss; 25lbs over past 1 month  - afebrile; no leukocytosis; abd exam benign without tenderness of distension  - CT showing 3.5 x 4.5 x 3.0 cm heterogenous solid mass in the pancreatic head/uncinate process with upstream main PD dilatation. Mass abuts the SMV and second portion of the duodenum. Mass also contacts the proximal SMA; extrahepatic/intrahepatic biliary dilation with CBD 2.2cm with abrupt narrowing to level of the pancreatic mass    Recommendations  - plan for EGD/EUS/ERCP Monday; or sooner if clinically indicated  - obtain INR; goal <1.8  - NPO after midnight Sunday for procedure Monday  - trend CBC, CMP  - monitor for fevers; if present, start empiric antibiotics  - monitor hemodynamics; if unstable, please reach out to GI team    Note and recommendations are incomplete until reviewed and attested by attending.    Juan Rojas MD  GI/Hepatology Fellow, PGY4  [employer] Pager [phone] or [facility] Pager [phone]  Teams preferred (7AM to 5PM); after 5PM, call GI fellow on call    On Weekends/Holidays (All Day) and Weekdays after 5PM to 8AM  For non-urgent consults, please email [email or web address] and [email or web address]  For urgent consults, please contact on call GI team. See Amion schedule ([facility]), [employer] paging system ([facility]), or call hospital  ([facility]) 63M with history of HTN, HLD, type 2 DM (on insulin), hyperthyroidism, asthma, GERD, and RAMANA (not on CPAP) presents with 1 week of painless jaundice    Impression  #obstructive jaundice 2/2 pancreatic head/uncinate mass  #weight loss; 25lbs over past 1 month  - afebrile; no leukocytosis; abd exam benign without tenderness of distension  - CT showing 3.5 x 4.5 x 3.0 cm heterogenous solid mass in the pancreatic head/uncinate process with upstream main PD dilatation. Mass abuts the SMV and second portion of the duodenum. Mass also contacts the proximal SMA; extrahepatic/intrahepatic biliary dilation with CBD 2.2cm with abrupt narrowing to level of the pancreatic mass    Recommendations  - plan for EGD/EUS/ERCP Monday; or sooner if clinically indicated  - obtain INR; goal <1.8  - NPO after midnight Sunday for procedure Monday  - trend CBC, CMP  - monitor for fevers; if present, start empiric antibiotics and page GI  - monitor hemodynamics; if unstable, please reach out to GI team    Note and recommendations are incomplete until reviewed and attested by attending.    Juan Rojas MD  GI/Hepatology Fellow, PGY4  [employer] Pager [phone] or [facility] Pager [phone]  Teams preferred (7AM to 5PM); after 5PM, call GI fellow on call    On Weekends/Holidays (All Day) and Weekdays after 5PM to 8AM  For non-urgent consults, please email [email or web address] and [email or web address]  For urgent consults, please contact on call GI team. See Amion schedule ([facility]), [employer] paging system ([facility]), or call hospital  ([facility])

## 2024-05-18 NOTE — H&P ADULT - PROBLEM SELECTOR PLAN 4
WBC 10.83 on admission. Pt afebrile, did not meet SIRS criteria. Possibly from malignancy.   - Trend CBC with diff  - Low threshold to start antibiotics if pt spikes fever, has worsening leukocytosis, or increasing hyperbilirubinemia due to concern for cholangitis given CBD dilatation

## 2024-05-18 NOTE — H&P ADULT - NSHPREVIEWOFSYSTEMS_GEN_ALL_CORE
Constitutional: +Weight loss. No generalized weakness, fever, or chills.  Eyes: +Scleral icterus. No visual changes, double vision, or eye pain.  Ears, Nose, Mouth, Throat: No runny nose, sinus pain, ear pain, tinnitus, sore throat, dysphagia, or odynophagia  Cardiovascular: No chest pain, palpitations, or LE edema  Respiratory: No cough, wheezing, hemoptysis, or shortness of breath  Gastrointestinal: +Intermittent nausea. +1 episode of vomiting last night. +Frothy, foul-smelling feces. No abdominal pain, hematemesis, melena, or BRBPR.  Genitourinary: +Foamy dark urine. No dysuria, frequency, urgency, or hematuria.  Musculoskeletal: No neck pain or back pain. No joint pain, swelling, or decreased ROM.  Skin: +Jaundice and pruritus  Neurologic: No syncope, seizures, headache, paresthesias, numbness, or limb weakness  Psychiatric: No depression, anxiety, difficulty concentrating, anhedonia, or lack of energy  Endocrine: No heat/cold intolerance, mood swings, sweats, polydipsia, or polyuria  Hematologic/lymphatic: No purpura, petechia, or prolonged or excessive bleeding after dental extraction / injury  Allergic/Immunologic: No anaphylaxis or allergic response to materials, foods, animals    Positives and pertinent negatives noted and all other systems negative.

## 2024-05-18 NOTE — PROGRESS NOTE ADULT - ASSESSMENT
64 yo man with history of HTN, HLD, type 2 DM (on insulin), hyperthyroidism, asthma, GERD, and RAMANA (not on CPAP) presents with 2 weeks of painless jaundice, found to have  a 3.5 x 4.5 x 3.0 cm heterogenous solid mass in the pancreatic head/uncinate process with extrahepatic and intrahepatic biliary dilatation.

## 2024-05-18 NOTE — H&P ADULT - PROBLEM SELECTOR PLAN 9
Pt on Ventolin nebulizer PRN at home. No S/S of exacerbation at this time.  - Duonebs PRN for SOB and/or wheezing ordered

## 2024-05-18 NOTE — H&P ADULT - NSICDXPASTMEDICALHX_GEN_ALL_CORE_FT
PAST MEDICAL HISTORY:  Asthma     GERD (gastroesophageal reflux disease)     History of cholecystectomy     HTN (hypertension)     Hyperlipidemia     Hyperthyroidism     Sleep apnea     Type 2 diabetes mellitus

## 2024-05-18 NOTE — H&P ADULT - NSHPPHYSICALEXAM_GEN_ALL_CORE
Vital Signs Last 24 Hrs  T(C): 36.4 (18 May 2024 01:02), Max: 36.4 (18 May 2024 00:05)  T(F): 97.5 (18 May 2024 01:02), Max: 97.6 (18 May 2024 00:05)  HR: 86 (18 May 2024 01:02) (75 - 87)  BP: 138/88 (18 May 2024 01:02) (129/82 - 146/86)  BP(mean): 107 (17 May 2024 20:45) (100 - 107)  RR: 18 (18 May 2024 01:02) (17 - 24)  SpO2: 100% (18 May 2024 01:02) (98% - 100%)    PHYSICAL EXAM:  General: Awake and alert.  No acute distress.  Head: Normocephalic, atraumatic.    Eyes: PERRL.  EOMI.  Scleral icterus.  No conjunctival pallor.  Mouth: +Sublingual jaundice.  Mildly dry MM.  No oropharyngeal exudates.    Neck: Supple.  Full range of motion.  No JVD.  No LAD.  No thyromegaly.  Trachea midline.    Heart: RRR.  Normal S1 and S2.  No murmurs, rubs, or gallops.  No LE edema b/l.   Lungs: Nonlabored breathing.  Good inspiratory effort.  CTAB.  No wheezes, crackles, or rhonchi.    Abdomen: BS+, soft, nontender with no rebound or guarding, nondistended.  No hepatomegaly.   Skin: Warm and dry.  No rashes.  Scattered abrasions from scratching in UE and LE b/l.   Extremities: No cyanosis.  2+ peripheral pulses b/l.  Musculoskeletal: No joint deformities.  No spinal or paraspinal tenderness.  Neuro: A&Ox3.  CN II-XII intact.  5/5 motor strength in UE and LE b/l.  Tactile sensation intact in UE and LE b/l.  No focal deficits.  Psychiatric: Normal mood, full affect.

## 2024-05-18 NOTE — H&P ADULT - PROBLEM SELECTOR PLAN 8
Pt no longer on methimazole for hyperthyroidism, only on atenolol 50 mg daily. TSH on admission 0.70.  - C/w atenolol 50 mg daily  - C/w outpatient follow-up visits with endocrine

## 2024-05-18 NOTE — PROGRESS NOTE ADULT - PROBLEM SELECTOR PLAN 1
Pt with 2 weeks of painless jaundice, associated with 25-lb weight loss, foamy dark urine, and changes to his bowel habits and stool caliber for the past 1 month. LFTs significant for t bili 29.5, direct bili >20, alk phos 532, AST 71, and ALT 38, consistent with cholestasis in setting of pancreatic mass causing extrahepatic and intrahepatic biliary ductal dilatation, including CBD dilation to 2.2 cm.  - GI consult emailed for possible biliary stent placement this admission  - Monitor LFTs  - Low threshold to start antibiotics if pt spikes fever, has worsening leukocytosis, or increasing hyperbilirubinemia due to concern for cholangitis given CBD dilatation   - Start Atarax 25 mg q8hrs PRN for itching and cholestyramine 4 g daily  - C/w famotidine 40 mg daily Pt with 2 weeks of painless jaundice, associated with 25-lb weight loss, foamy dark urine, and changes to his bowel habits and stool caliber for the past 1 month. LFTs significant for t bili 29.5, direct bili >20, alk phos 532, AST 71, and ALT 38, consistent with cholestasis in setting of pancreatic mass causing extrahepatic and intrahepatic biliary ductal dilatation, including CBD dilation to 2.2 cm.  - f/u GI recs  - daily LFTs  - Low threshold to start antibiotics if pt spikes fever, has worsening leukocytosis, or increasing hyperbilirubinemia due to concern for cholangitis given CBD dilatation   - Start Atarax 25 mg q8hrs PRN for itching and cholestyramine 4 g daily  - C/w famotidine 40 mg daily

## 2024-05-18 NOTE — H&P ADULT - PROBLEM SELECTOR PLAN 3
Pt with serum HCO3 19 with AG 18 on admission. On VBG, pH 7.35 and lactate 3.1. Possibly from type B lactic acidosis in setting of pancreatic malignancy. Unlikely to be DKA, as BHB negative and UA negative for ketones.   - Monitor CMP and repeat VBG with lactate in AM

## 2024-05-18 NOTE — CONSULT NOTE ADULT - ATTENDING COMMENTS
63M, admitted w jaundice, weight loss   Found to have elevated ALP and tbili   CT abd/pelvis -- panc head/uncinate mass    Plan for egd/eus/ercp on monday   trend labs   monitor for signs/symptoms of cholangitis     GI to follow, please call w questions

## 2024-05-18 NOTE — PATIENT PROFILE ADULT - STATED REASON FOR ADMISSION
Because of feeling really sick, jaundice set in, SOB, could not stand up, diarrhea, weakness, difficulty absorbing food, severe itching

## 2024-05-18 NOTE — H&P ADULT - PROBLEM SELECTOR PLAN 1
Pt with 2 weeks of painless jaundice, associated with 25-lb weight loss, foamy dark urine, and changes to his bowel habits and stool caliber for the past 1 month. LFTs significant for t bili 29.5, direct bili >20, alk phos 532, AST 71, and ALT 38, consistent with cholestasis in setting of pancreatic mass causing extrahepatic and intrahepatic biliary ductal dilatation, including CBD dilation to 2.2 cm.  - GI consult emailed for possible biliary stent placement this admission  - Monitor LFTs  - Low threshold to start antibiotics if pt spikes fever, has worsening leukocytosis, or increasing hyperbilirubinemia due to concern for cholangitis given CBD dilatation   - Start Atarax 25 mg q8hrs PRN for itching and cholestyramine 4 g daily  - C/w famotidine 40 mg daily

## 2024-05-18 NOTE — H&P ADULT - HISTORY OF PRESENT ILLNESS
64 yo man with history of HTN, HLD, type 2 DM (on insulin), hyperthyroidism, asthma, GERD, and RAMANA (not on CPAP) presents with 2 weeks of painless jaundice. Pt states that he started noticing yellowing of his eyes, then his skin ~2 weeks ago, associated with diffuse pruritus. Pt also has been having  64 yo man with history of HTN, HLD, type 2 DM (on insulin), hyperthyroidism, asthma, GERD, and RAMANA (not on CPAP) presents with 2 weeks of painless jaundice. Pt states that he started noticing yellowing of his eyes, then his skin ~2 weeks ago, associated with diffuse pruritus. Pt also has been having a 25-lb weight loss, foamy dark urine, and changes to his bowel habits and stool caliber for the past 1 month. Pt notes that whenever he eats a meal, he would develop abdominal cramping along the mid-section of his abdomen, then have a BM with frothy, foul-smelling feces. Pt denies any fever, chills  64 yo man with history of HTN, HLD, type 2 DM (on insulin), hyperthyroidism, asthma, GERD, and RAMANA (not on CPAP) presents with 2 weeks of painless jaundice. Pt states that he started noticing yellowing of his eyes, then his skin ~2 weeks ago, associated with diffuse pruritus. Pt also has been having a 25-lb weight loss, foamy dark urine, and changes to his bowel habits and stool caliber for the past 1 month. Pt notes that whenever he eats a meal, he would develop abdominal cramping along the mid-section of his abdomen, then have a BM with frothy, foul-smelling feces. Pt denies any fever or chills but reports having intermittent nausea recently with 1 episode of NBNB vomiting last night. No additional vomiting since then. Pt had been in [place] for the last 1.5 months, arriving back [place] the [place] due to his symptoms. Pt denies any chest pain, shortness of breath, palpitations, abdominal pain, melena, BRBPR, dysuria, or LE pain/swelling. Pt has significant family history of malignancy, including in his maternal grandfather, mother, and sister.    In the ED,   T 97.4-97.6, HR 75-87, -146/78-92, RR 17-24, SpO2 % RA.  T bili 29.5, direct bili >20, alk phos 532

## 2024-05-18 NOTE — H&P ADULT - ASSESSMENT
62 yo man with history of HTN, HLD, type 2 DM (on insulin), hyperthyroidism, asthma, GERD, and RAMANA (not on CPAP) presents with 2 weeks of painless jaundice, found to have  a 3.5 x 4.5 x 3.0 cm heterogenous solid mass in the pancreatic head/uncinate process with extrahepatic and intrahepatic biliary dilatation.

## 2024-05-18 NOTE — PROGRESS NOTE ADULT - SUBJECTIVE AND OBJECTIVE BOX
*******************************  Ava Helm MD (PGY-1)  Internal Medicine  Contact via [employer] TEAMS  *******************************    ADA COX  63y  Male    Patient is a 63y old  Male who presents with a chief complaint of Jaundice, pancreatic mass (18 May 2024 00:12)      Subjective:    Objective:  T(C): 36.4 (05-18-24 @ 01:02), Max: 36.4 (05-18-24 @ 00:05)  HR: 86 (05-18-24 @ 01:02) (75 - 87)  BP: 138/88 (05-18-24 @ 01:02) (129/82 - 146/86)  RR: 18 (05-18-24 @ 01:02) (17 - 24)  SpO2: 100% (05-18-24 @ 01:02) (98% - 100%)  I&O's Summary    17 May 2024 07:01  -  18 May 2024 07:00  --------------------------------------------------------  IN: 0 mL / OUT: 0 mL / NET: 0 mL        PHYSICAL EXAM:  GENERAL: NAD  HEAD:  Atraumatic, Normocephalic  EYES: EOMI, PERRLA, conjunctiva and sclera clear  ENMT: Moist mucous membranes  NECK: Supple, No JVD, trachea midline   NERVOUS SYSTEM:  Alert & Oriented X3, Good concentration; Motor Strength 5/5 B/L upper and lower extremities; DTRs 2+ intact and symmetric  CHEST/LUNG: Clear to auscultation bilaterally; No rales, rhonchi, wheezing, or rubs  HEART: Regular rate and rhythm; No murmurs, rubs, or gallops  ABDOMEN: Soft, Nontender, Nondistended; Bowel sounds present  EXTREMITIES:  2+ Peripheral Pulses, No clubbing, cyanosis, or edema  LYMPH: No lymphadenopathy noted  SKIN: No rashes or lesions    MEDICATIONS  (STANDING):  atenolol  Tablet 50 milliGRAM(s) Oral daily  atorvastatin 40 milliGRAM(s) Oral at bedtime  cholestyramine Powder (Sugar-Free) 4 Gram(s) Oral daily  dextrose 10% Bolus 125 milliLiter(s) IV Bolus once  dextrose 5%. 1000 milliLiter(s) (100 mL/Hr) IV Continuous <Continuous>  dextrose 5%. 1000 milliLiter(s) (50 mL/Hr) IV Continuous <Continuous>  dextrose 50% Injectable 12.5 Gram(s) IV Push once  dextrose 50% Injectable 25 Gram(s) IV Push once  enoxaparin Injectable 40 milliGRAM(s) SubCutaneous every 24 hours  famotidine    Tablet 40 milliGRAM(s) Oral daily  glucagon  Injectable 1 milliGRAM(s) IntraMuscular once  insulin glargine Injectable (LANTUS) 40 Unit(s) SubCutaneous at bedtime  insulin lispro (ADMELOG) corrective regimen sliding scale   SubCutaneous three times a day before meals  insulin lispro (ADMELOG) corrective regimen sliding scale   SubCutaneous at bedtime    MEDICATIONS  (PRN):  acetaminophen     Tablet .. 650 milliGRAM(s) Oral every 6 hours PRN Temp greater or equal to 38C (100.4F), Mild Pain (1 - 3), Moderate Pain (4 - 6), Severe Pain (7 - 10)  albuterol/ipratropium for Nebulization 3 milliLiter(s) Nebulizer every 6 hours PRN Shortness of Breath and/or Wheezing  dextrose Oral Gel 15 Gram(s) Oral once PRN Blood Glucose LESS THAN 70 milliGRAM(s)/deciliter  hydrOXYzine hydrochloride 25 milliGRAM(s) Oral every 8 hours PRN Itching      LABS:        CAPILLARY BLOOD GLUCOSE      POCT Blood Glucose.: 304 mg/dL (18 May 2024 01:00)  POCT Blood Glucose.: 214 mg/dL (17 May 2024 22:24)  POCT Blood Glucose.: 349 mg/dL (17 May 2024 14:53)      RADIOLOGY & ADDITIONAL TESTS:               *******************************  Ava Helm MD (PGY-1)  Internal Medicine  Contact via [employer] TEAMS  *******************************    ADA COX  63y  Male    Patient is a 63y old  Male who presents with a chief complaint of Jaundice, pancreatic mass (18 May 2024 00:12)    Subjective: No acute events overnight. Patient seen at bedside this morning. Endorsing generalized itching. Denied any fever, chills, chest pain, sob, ab pain, n/v.     Objective:  T(C): 36.4 (05-18-24 @ 01:02), Max: 36.4 (05-18-24 @ 00:05)  HR: 86 (05-18-24 @ 01:02) (75 - 87)  BP: 138/88 (05-18-24 @ 01:02) (129/82 - 146/86)  RR: 18 (05-18-24 @ 01:02) (17 - 24)  SpO2: 100% (05-18-24 @ 01:02) (98% - 100%)  I&O's Summary    17 May 2024 07:01  -  18 May 2024 07:00  --------------------------------------------------------  IN: 0 mL / OUT: 0 mL / NET: 0 mL    PHYSICAL EXAM:  GENERAL: NAD  HEAD:  Atraumatic, Normocephalic  EYES: EOMI, PERRLA, scleral icterus   ENMT: Moist mucous membranes  NECK: Supple, No JVD, trachea midline   NERVOUS SYSTEM:  Alert & Oriented X3, Good concentration, CN II-XII grossly intact   CHEST/LUNG: Clear to auscultation bilaterally; No rales, rhonchi, wheezing, or rubs  HEART: Regular rate and rhythm; No murmurs, rubs, or gallops  ABDOMEN: Soft, Nontender, Nondistended; Bowel sounds present  EXTREMITIES:  2+ Peripheral Pulses, No clubbing, cyanosis, or edema  SKIN: jaundice     MEDICATIONS  (STANDING):  atenolol  Tablet 50 milliGRAM(s) Oral daily  atorvastatin 40 milliGRAM(s) Oral at bedtime  cholestyramine Powder (Sugar-Free) 4 Gram(s) Oral daily  dextrose 10% Bolus 125 milliLiter(s) IV Bolus once  dextrose 5%. 1000 milliLiter(s) (100 mL/Hr) IV Continuous <Continuous>  dextrose 5%. 1000 milliLiter(s) (50 mL/Hr) IV Continuous <Continuous>  dextrose 50% Injectable 12.5 Gram(s) IV Push once  dextrose 50% Injectable 25 Gram(s) IV Push once  enoxaparin Injectable 40 milliGRAM(s) SubCutaneous every 24 hours  famotidine    Tablet 40 milliGRAM(s) Oral daily  glucagon  Injectable 1 milliGRAM(s) IntraMuscular once  insulin glargine Injectable (LANTUS) 40 Unit(s) SubCutaneous at bedtime  insulin lispro (ADMELOG) corrective regimen sliding scale   SubCutaneous three times a day before meals  insulin lispro (ADMELOG) corrective regimen sliding scale   SubCutaneous at bedtime    MEDICATIONS  (PRN):  acetaminophen     Tablet .. 650 milliGRAM(s) Oral every 6 hours PRN Temp greater or equal to 38C (100.4F), Mild Pain (1 - 3), Moderate Pain (4 - 6), Severe Pain (7 - 10)  albuterol/ipratropium for Nebulization 3 milliLiter(s) Nebulizer every 6 hours PRN Shortness of Breath and/or Wheezing  dextrose Oral Gel 15 Gram(s) Oral once PRN Blood Glucose LESS THAN 70 milliGRAM(s)/deciliter  hydrOXYzine hydrochloride 25 milliGRAM(s) Oral every 8 hours PRN Itching    LABS:             14.1   10.83 )-----------( 232      ( 17 May 2024 16:22 )             39.5     05-17    130<L>  |  93<L>  |  21  ----------------------------<  364<H>  4.5   |  19<L>  |  0.86    Ca    9.5      17 May 2024 16:22    TPro  8.0  /  Alb  3.3  /  TBili  29.5<H>  /  DBili  >20.0<H>  /  AST  71<H>  /  ALT  38  /  AlkPhos  532<H>  05-17    CAPILLARY BLOOD GLUCOSE  POCT Blood Glucose.: 133 mg/dL (18 May 2024 08:50)  POCT Blood Glucose.: 304 mg/dL (18 May 2024 01:00)  POCT Blood Glucose.: 214 mg/dL (17 May 2024 22:24)  POCT Blood Glucose.: 349 mg/dL (17 May 2024 14:53)    RADIOLOGY & ADDITIONAL TESTS:

## 2024-05-18 NOTE — H&P ADULT - PROBLEM SELECTOR PLAN 6
- C/w atorvastatin 40 mg qHS as therapeutic interchange for rosuvastatin 10 mg qHS  - Check lipid profile

## 2024-05-18 NOTE — H&P ADULT - TIME BILLING
I had a face to face encounter with this patient. I spent 80 total minutes on chart review, bedside interview and physical exam, orders, interpretation of results, documentation, and coordination of care for this patient. P seen and examined on 5/17/24.  I had a face to face encounter with this patient. I spent 80 total minutes on chart review, bedside interview and physical exam, orders, interpretation of results, documentation, and coordination of care for this patient.

## 2024-05-18 NOTE — H&P ADULT - PROBLEM SELECTOR PLAN 2
CTAP with IV contrast demonstrating a 3.5 x 4.5 x 3.0 cm heterogenous solid mass in the pancreatic head/uncinate process with upstream main pancreatic ductal dilatation as well as extrahepatic and intrahepatic biliary ductal dilatation, including CBD dilation to 2.2 cm. Suspicious for pancreatic malignancy.  - F/u GI consult for possible ERCP/EUS with FNA biopsy  - Surgery consult to be called in AM  - Check CA 19-9 and CEA  - Check IgG4 to r/o autoimmune pancreatitis  - Plan as above for cholestatic jaundice  - Oncology evaluation once tissue diagnosis obtained

## 2024-05-18 NOTE — H&P ADULT - PROBLEM SELECTOR PLAN 5
BPs in acceptable range on admission. Pt on atenolol 50 mg daily and some other BP med that pt cannot remember the name of at this time (?lisinopril 20 mg daily).  - C/w atenolol 50 mg daily   - Complete Med Rec in AM with pt's family and/or pharmacy   - Monitor VS q4hrs

## 2024-05-19 LAB
ALBUMIN SERPL ELPH-MCNC: 2.8 G/DL — LOW (ref 3.3–5)
ALP SERPL-CCNC: 474 U/L — HIGH (ref 40–120)
ALT FLD-CCNC: 32 U/L — SIGNIFICANT CHANGE UP (ref 4–41)
ANION GAP SERPL CALC-SCNC: 13 MMOL/L — SIGNIFICANT CHANGE UP (ref 7–14)
AST SERPL-CCNC: 58 U/L — HIGH (ref 4–40)
BASOPHILS # BLD AUTO: 0.04 K/UL — SIGNIFICANT CHANGE UP (ref 0–0.2)
BASOPHILS NFR BLD AUTO: 0.6 % — SIGNIFICANT CHANGE UP (ref 0–2)
BILIRUB SERPL-MCNC: 27.8 MG/DL — HIGH (ref 0.2–1.2)
BUN SERPL-MCNC: 14 MG/DL — SIGNIFICANT CHANGE UP (ref 7–23)
CALCIUM SERPL-MCNC: 9.1 MG/DL — SIGNIFICANT CHANGE UP (ref 8.4–10.5)
CANCER AG125 SERPL-ACNC: 12 U/ML — SIGNIFICANT CHANGE UP
CANCER AG19-9 SERPL-ACNC: 2142 U/ML — HIGH
CEA SERPL-MCNC: 14.5 NG/ML — HIGH (ref 0–3.8)
CHLORIDE SERPL-SCNC: 97 MMOL/L — LOW (ref 98–107)
CO2 SERPL-SCNC: 23 MMOL/L — SIGNIFICANT CHANGE UP (ref 22–31)
CREAT SERPL-MCNC: 0.86 MG/DL — SIGNIFICANT CHANGE UP (ref 0.5–1.3)
CULTURE RESULTS: SIGNIFICANT CHANGE UP
EGFR: 97 ML/MIN/1.73M2 — SIGNIFICANT CHANGE UP
EOSINOPHIL # BLD AUTO: 0.19 K/UL — SIGNIFICANT CHANGE UP (ref 0–0.5)
EOSINOPHIL NFR BLD AUTO: 2.7 % — SIGNIFICANT CHANGE UP (ref 0–6)
GLUCOSE BLDC GLUCOMTR-MCNC: 115 MG/DL — HIGH (ref 70–99)
GLUCOSE BLDC GLUCOMTR-MCNC: 120 MG/DL — HIGH (ref 70–99)
GLUCOSE BLDC GLUCOMTR-MCNC: 141 MG/DL — HIGH (ref 70–99)
GLUCOSE BLDC GLUCOMTR-MCNC: 93 MG/DL — SIGNIFICANT CHANGE UP (ref 70–99)
GLUCOSE SERPL-MCNC: 155 MG/DL — HIGH (ref 70–99)
HCT VFR BLD CALC: 37.2 % — LOW (ref 39–50)
HGB BLD-MCNC: 13.4 G/DL — SIGNIFICANT CHANGE UP (ref 13–17)
IANC: 5.36 K/UL — SIGNIFICANT CHANGE UP (ref 1.8–7.4)
IMM GRANULOCYTES NFR BLD AUTO: 0.7 % — SIGNIFICANT CHANGE UP (ref 0–0.9)
LYMPHOCYTES # BLD AUTO: 1.09 K/UL — SIGNIFICANT CHANGE UP (ref 1–3.3)
LYMPHOCYTES # BLD AUTO: 15.3 % — SIGNIFICANT CHANGE UP (ref 13–44)
MAGNESIUM SERPL-MCNC: 2 MG/DL — SIGNIFICANT CHANGE UP (ref 1.6–2.6)
MCHC RBC-ENTMCNC: 30.9 PG — SIGNIFICANT CHANGE UP (ref 27–34)
MCHC RBC-ENTMCNC: 36 GM/DL — SIGNIFICANT CHANGE UP (ref 32–36)
MCV RBC AUTO: 85.9 FL — SIGNIFICANT CHANGE UP (ref 80–100)
MONOCYTES # BLD AUTO: 0.41 K/UL — SIGNIFICANT CHANGE UP (ref 0–0.9)
MONOCYTES NFR BLD AUTO: 5.7 % — SIGNIFICANT CHANGE UP (ref 2–14)
NEUTROPHILS # BLD AUTO: 5.36 K/UL — SIGNIFICANT CHANGE UP (ref 1.8–7.4)
NEUTROPHILS NFR BLD AUTO: 75 % — SIGNIFICANT CHANGE UP (ref 43–77)
NRBC # BLD: 0 /100 WBCS — SIGNIFICANT CHANGE UP (ref 0–0)
NRBC # FLD: 0 K/UL — SIGNIFICANT CHANGE UP (ref 0–0)
PHOSPHATE SERPL-MCNC: 2.4 MG/DL — LOW (ref 2.5–4.5)
PLATELET # BLD AUTO: 191 K/UL — SIGNIFICANT CHANGE UP (ref 150–400)
POTASSIUM SERPL-MCNC: 4.1 MMOL/L — SIGNIFICANT CHANGE UP (ref 3.5–5.3)
POTASSIUM SERPL-SCNC: 4.1 MMOL/L — SIGNIFICANT CHANGE UP (ref 3.5–5.3)
PROT SERPL-MCNC: 7.1 G/DL — SIGNIFICANT CHANGE UP (ref 6–8.3)
RBC # BLD: 4.33 M/UL — SIGNIFICANT CHANGE UP (ref 4.2–5.8)
RBC # FLD: 22.6 % — HIGH (ref 10.3–14.5)
SODIUM SERPL-SCNC: 133 MMOL/L — LOW (ref 135–145)
SPECIMEN SOURCE: SIGNIFICANT CHANGE UP
WBC # BLD: 7.14 K/UL — SIGNIFICANT CHANGE UP (ref 3.8–10.5)
WBC # FLD AUTO: 7.14 K/UL — SIGNIFICANT CHANGE UP (ref 3.8–10.5)

## 2024-05-19 PROCEDURE — 99233 SBSQ HOSP IP/OBS HIGH 50: CPT | Mod: GC

## 2024-05-19 RX ORDER — DEXTROSE 50 % IN WATER 50 %
15 SYRINGE (ML) INTRAVENOUS ONCE
Refills: 0 | Status: DISCONTINUED | OUTPATIENT
Start: 2024-05-19 | End: 2024-05-31

## 2024-05-19 RX ORDER — DEXTROSE 10 % IN WATER 10 %
125 INTRAVENOUS SOLUTION INTRAVENOUS ONCE
Refills: 0 | Status: DISCONTINUED | OUTPATIENT
Start: 2024-05-19 | End: 2024-05-29

## 2024-05-19 RX ORDER — SODIUM CHLORIDE 9 MG/ML
1000 INJECTION, SOLUTION INTRAVENOUS
Refills: 0 | Status: DISCONTINUED | OUTPATIENT
Start: 2024-05-19 | End: 2024-05-29

## 2024-05-19 RX ORDER — GLUCAGON INJECTION, SOLUTION 0.5 MG/.1ML
1 INJECTION, SOLUTION SUBCUTANEOUS ONCE
Refills: 0 | Status: DISCONTINUED | OUTPATIENT
Start: 2024-05-19 | End: 2024-05-28

## 2024-05-19 RX ORDER — INSULIN GLARGINE 100 [IU]/ML
20 INJECTION, SOLUTION SUBCUTANEOUS AT BEDTIME
Refills: 0 | Status: DISCONTINUED | OUTPATIENT
Start: 2024-05-19 | End: 2024-05-20

## 2024-05-19 RX ORDER — DEXTROSE 50 % IN WATER 50 %
25 SYRINGE (ML) INTRAVENOUS ONCE
Refills: 0 | Status: DISCONTINUED | OUTPATIENT
Start: 2024-05-19 | End: 2024-05-31

## 2024-05-19 RX ORDER — ONDANSETRON 8 MG/1
4 TABLET, FILM COATED ORAL ONCE
Refills: 0 | Status: COMPLETED | OUTPATIENT
Start: 2024-05-19 | End: 2024-05-19

## 2024-05-19 RX ORDER — DEXTROSE 50 % IN WATER 50 %
12.5 SYRINGE (ML) INTRAVENOUS ONCE
Refills: 0 | Status: DISCONTINUED | OUTPATIENT
Start: 2024-05-19 | End: 2024-05-31

## 2024-05-19 RX ORDER — INSULIN LISPRO 100/ML
VIAL (ML) SUBCUTANEOUS EVERY 6 HOURS
Refills: 0 | Status: DISCONTINUED | OUTPATIENT
Start: 2024-05-19 | End: 2024-05-21

## 2024-05-19 RX ADMIN — Medication 25 MILLIGRAM(S): at 07:28

## 2024-05-19 RX ADMIN — ONDANSETRON 4 MILLIGRAM(S): 8 TABLET, FILM COATED ORAL at 07:24

## 2024-05-19 RX ADMIN — URSODIOL 250 MILLIGRAM(S): 250 TABLET, FILM COATED ORAL at 17:59

## 2024-05-19 RX ADMIN — INSULIN GLARGINE 20 UNIT(S): 100 INJECTION, SOLUTION SUBCUTANEOUS at 22:24

## 2024-05-19 RX ADMIN — URSODIOL 250 MILLIGRAM(S): 250 TABLET, FILM COATED ORAL at 07:25

## 2024-05-19 RX ADMIN — FAMOTIDINE 40 MILLIGRAM(S): 10 INJECTION INTRAVENOUS at 12:01

## 2024-05-19 RX ADMIN — Medication 25 MILLIGRAM(S): at 22:23

## 2024-05-19 RX ADMIN — ATENOLOL 50 MILLIGRAM(S): 25 TABLET ORAL at 07:25

## 2024-05-19 RX ADMIN — CHOLESTYRAMINE 4 GRAM(S): 4 POWDER, FOR SUSPENSION ORAL at 09:07

## 2024-05-19 RX ADMIN — ATORVASTATIN CALCIUM 40 MILLIGRAM(S): 80 TABLET, FILM COATED ORAL at 22:24

## 2024-05-19 RX ADMIN — ENOXAPARIN SODIUM 40 MILLIGRAM(S): 100 INJECTION SUBCUTANEOUS at 12:01

## 2024-05-19 NOTE — PROGRESS NOTE ADULT - PROBLEM SELECTOR PROBLEM 8
Hyperthyroidism Pt ambulatory to restroom with steady gait and use of walker. Pt asking to eat. Awaiting completed cardiac workup prior to eating. Pt verbalized understanding.       Tru Bradley RN  01/27/20 [id number]

## 2024-05-19 NOTE — PROGRESS NOTE ADULT - SUBJECTIVE AND OBJECTIVE BOX
Sudhir Stafford MD   Internal Medicine, PGY 2  Contact via TEAMS.     SUBJECTIVE / OVERNIGHT EVENTS:  - Pt seen and examined at bedside  - [facility]ILL    MEDICATIONS  (STANDING):  atenolol  Tablet 50 milliGRAM(s) Oral daily  atorvastatin 40 milliGRAM(s) Oral at bedtime  cholestyramine Powder (Sugar-Free) 4 Gram(s) Oral daily  dextrose 10% Bolus 125 milliLiter(s) IV Bolus once  dextrose 5%. 1000 milliLiter(s) (100 mL/Hr) IV Continuous <Continuous>  dextrose 5%. 1000 milliLiter(s) (50 mL/Hr) IV Continuous <Continuous>  dextrose 50% Injectable 12.5 Gram(s) IV Push once  dextrose 50% Injectable 25 Gram(s) IV Push once  enoxaparin Injectable 40 milliGRAM(s) SubCutaneous every 24 hours  famotidine    Tablet 40 milliGRAM(s) Oral daily  glucagon  Injectable 1 milliGRAM(s) IntraMuscular once  insulin glargine Injectable (LANTUS) 40 Unit(s) SubCutaneous at bedtime  insulin lispro (ADMELOG) corrective regimen sliding scale   SubCutaneous at bedtime  insulin lispro (ADMELOG) corrective regimen sliding scale   SubCutaneous three times a day before meals  ursodiol Tablet 250 milliGRAM(s) Oral two times a day    MEDICATIONS  (PRN):  acetaminophen     Tablet .. 650 milliGRAM(s) Oral every 6 hours PRN Temp greater or equal to 38C (100.4F), Mild Pain (1 - 3), Moderate Pain (4 - 6), Severe Pain (7 - 10)  albuterol/ipratropium for Nebulization 3 milliLiter(s) Nebulizer every 6 hours PRN Shortness of Breath and/or Wheezing  dextrose Oral Gel 15 Gram(s) Oral once PRN Blood Glucose LESS THAN 70 milliGRAM(s)/deciliter  hydrOXYzine hydrochloride 25 milliGRAM(s) Oral every 8 hours PRN Itching        05-18-24 @ 07:01  -  05-19-24 @ 07:00  --------------------------------------------------------  IN: 200 mL / OUT: 700 mL / NET: -500 mL        PHYSICAL EXAM:  Vital Signs Last 24 Hrs  T(C): 36.6 (19 May 2024 05:16), Max: 36.7 (18 May 2024 21:41)  T(F): 97.9 (19 May 2024 05:16), Max: 98.1 (18 May 2024 21:41)  HR: 86 (19 May 2024 07:20) (71 - 89)  BP: 139/83 (19 May 2024 07:20) (135/75 - 149/91)  BP(mean): --  RR: 18 (19 May 2024 05:16) (18 - 18)  SpO2: 100% (19 May 2024 05:16) (100% - 100%)    Parameters below as of 19 May 2024 05:16  Patient On (Oxygen Delivery Method): room air        CAPILLARY BLOOD GLUCOSE      POCT Blood Glucose.: 209 mg/dL (18 May 2024 21:56)  POCT Blood Glucose.: 155 mg/dL (18 May 2024 17:55)  POCT Blood Glucose.: 187 mg/dL (18 May 2024 12:35)  POCT Blood Glucose.: 133 mg/dL (18 May 2024 08:50)    I&O's Summary    18 May 2024 07:01  -  19 May 2024 07:00  --------------------------------------------------------  IN: 200 mL / OUT: 700 mL / NET: -500 mL        CONSTITUTIONAL: NAD  HEENT: NC/AT  RESPIRATORY: Normal respiratory effort; lungs are clear to auscultation bilaterally  CARDIOVASCULAR: Regular rate and rhythm, normal S1 and S2, no murmur/rub/gallop; No lower extremity edema; Peripheral pulses are 2+ bilaterally  ABDOMEN: Nontender to palpation, normoactive bowel sounds, no rebound/guarding; No hepatosplenomegaly  MUSCLOSKELETAL: no clubbing or cyanosis of digits; no joint swelling or tenderness to palpation  EXTREMITIES: no peripheral edema, distal pulses intact   NEURO: no focal deficits   PSYCH: A+O to person, place, and time; affect appropriate    LABS:                        13.4   7.14  )-----------( 191      ( 19 May 2024 06:03 )             37.2     05-19    133<L>  |  97<L>  |  14  ----------------------------<  155<H>  4.1   |  23  |  0.86    Ca    9.1      19 May 2024 06:03  Phos  2.4     05-19  Mg     2.00     05-19    TPro  7.1  /  Alb  2.8<L>  /  TBili  27.8<H>  /  DBili  x   /  AST  58<H>  /  ALT  32  /  AlkPhos  474<H>  05-19          Urinalysis Basic - ( 19 May 2024 06:03 )    Color: x / Appearance: x / SG: x / pH: x  Gluc: 155 mg/dL / Ketone: x  / Bili: x / Urobili: x   Blood: x / Protein: x / Nitrite: x   Leuk Esterase: x / RBC: x / WBC x   Sq Epi: x / Non Sq Epi: x / Bacteria: x          IMAGING:    [X] All pertinent imaging reviewed by me

## 2024-05-19 NOTE — PROGRESS NOTE ADULT - PROBLEM SELECTOR PLAN 1
Pt with 2 weeks of painless jaundice, associated with 25-lb weight loss, foamy dark urine, and changes to his bowel habits and stool caliber for the past 1 month. LFTs significant for t bili 29.5, direct bili >20, alk phos 532, AST 71, and ALT 38, consistent with cholestasis in setting of pancreatic mass causing extrahepatic and intrahepatic biliary ductal dilatation, including CBD dilation to 2.2 cm.  - f/u GI recs  - daily LFTs  - Low threshold to start antibiotics if pt spikes fever, has worsening leukocytosis, or increasing hyperbilirubinemia due to concern for cholangitis given CBD dilatation   - Start Atarax 25 mg q8hrs PRN for itching and cholestyramine 4 g daily  - C/w famotidine 40 mg daily

## 2024-05-19 NOTE — PROGRESS NOTE ADULT - PROBLEM SELECTOR PLAN 2
CTAP with IV contrast demonstrating a 3.5 x 4.5 x 3.0 cm heterogenous solid mass in the pancreatic head/uncinate process with upstream main pancreatic ductal dilatation as well as extrahepatic and intrahepatic biliary ductal dilatation, including CBD dilation to 2.2 cm. Suspicious for pancreatic malignancy.    - F/u GI consult for possible ERCP/EUS with FNA biopsy  - Surgery consult to be called in AM  - Check CA 19-9,  and CEA  - Check IgG4 to r/o autoimmune pancreatitis  - Plan as above for cholestatic jaundice  - Oncology evaluation once tissue diagnosis obtained

## 2024-05-19 NOTE — CHART NOTE - NSCHARTNOTEFT_GEN_A_CORE
Tentatively scheduled for EGD/EUS/FNB/ERCP tomorrow. Keep NPO after midnight. 5AM labs.    Juan Rojas MD  GI/Hepatology Fellow, PGY4  [place] Pager [phone] or [facility] Pager [phone]  Teams preferred (7AM to 5PM); after 5PM, call GI fellow on call    On Weekends/Holidays (All Day) and Weekdays after 5PM to 8AM  For non-urgent consults, please email [email or web address] and [email or web address]  For urgent consults, please contact on call GI team. See Amion schedule ([facility]), [employer] paging system ([facility]), or call hospital  ([facility])

## 2024-05-20 LAB
ALBUMIN SERPL ELPH-MCNC: 2.5 G/DL — LOW (ref 3.3–5)
ALP SERPL-CCNC: 457 U/L — HIGH (ref 40–120)
ALT FLD-CCNC: 29 U/L — SIGNIFICANT CHANGE UP (ref 4–41)
ANION GAP SERPL CALC-SCNC: 13 MMOL/L — SIGNIFICANT CHANGE UP (ref 7–14)
APTT BLD: 42.2 SEC — HIGH (ref 24.5–35.6)
AST SERPL-CCNC: 56 U/L — HIGH (ref 4–40)
BILIRUB SERPL-MCNC: 25.6 MG/DL — HIGH (ref 0.2–1.2)
BLD GP AB SCN SERPL QL: NEGATIVE — SIGNIFICANT CHANGE UP
BUN SERPL-MCNC: 17 MG/DL — SIGNIFICANT CHANGE UP (ref 7–23)
CALCIUM SERPL-MCNC: 8.8 MG/DL — SIGNIFICANT CHANGE UP (ref 8.4–10.5)
CHLORIDE SERPL-SCNC: 100 MMOL/L — SIGNIFICANT CHANGE UP (ref 98–107)
CO2 SERPL-SCNC: 20 MMOL/L — LOW (ref 22–31)
CREAT SERPL-MCNC: 0.92 MG/DL — SIGNIFICANT CHANGE UP (ref 0.5–1.3)
EGFR: 93 ML/MIN/1.73M2 — SIGNIFICANT CHANGE UP
GLUCOSE BLDC GLUCOMTR-MCNC: 100 MG/DL — HIGH (ref 70–99)
GLUCOSE BLDC GLUCOMTR-MCNC: 104 MG/DL — HIGH (ref 70–99)
GLUCOSE BLDC GLUCOMTR-MCNC: 127 MG/DL — HIGH (ref 70–99)
GLUCOSE BLDC GLUCOMTR-MCNC: 132 MG/DL — HIGH (ref 70–99)
GLUCOSE BLDC GLUCOMTR-MCNC: 55 MG/DL — LOW (ref 70–99)
GLUCOSE BLDC GLUCOMTR-MCNC: 56 MG/DL — LOW (ref 70–99)
GLUCOSE BLDC GLUCOMTR-MCNC: 87 MG/DL — SIGNIFICANT CHANGE UP (ref 70–99)
GLUCOSE BLDC GLUCOMTR-MCNC: 93 MG/DL — SIGNIFICANT CHANGE UP (ref 70–99)
GLUCOSE SERPL-MCNC: 65 MG/DL — LOW (ref 70–99)
HCT VFR BLD CALC: 39 % — SIGNIFICANT CHANGE UP (ref 39–50)
HGB BLD-MCNC: 13.9 G/DL — SIGNIFICANT CHANGE UP (ref 13–17)
IGG FLD-MCNC: 1470 MG/DL — SIGNIFICANT CHANGE UP (ref 610–1660)
IGG1 SER-MCNC: 719 MG/DL — SIGNIFICANT CHANGE UP (ref 240–1118)
IGG2 SER-MCNC: 534 MG/DL — SIGNIFICANT CHANGE UP (ref 124–549)
IGG3 SER-MCNC: 43.4 MG/DL — SIGNIFICANT CHANGE UP (ref 15–102)
IGG4 SER-MCNC: 98.1 MG/DL — SIGNIFICANT CHANGE UP (ref 1–123)
INR BLD: 1.94 RATIO — HIGH (ref 0.85–1.18)
INR BLD: 2.21 RATIO — HIGH (ref 0.85–1.18)
INR BLD: 2.28 RATIO — HIGH (ref 0.85–1.18)
MAGNESIUM SERPL-MCNC: 1.9 MG/DL — SIGNIFICANT CHANGE UP (ref 1.6–2.6)
MCHC RBC-ENTMCNC: 30.5 PG — SIGNIFICANT CHANGE UP (ref 27–34)
MCHC RBC-ENTMCNC: 35.6 GM/DL — SIGNIFICANT CHANGE UP (ref 32–36)
MCV RBC AUTO: 85.5 FL — SIGNIFICANT CHANGE UP (ref 80–100)
NRBC # BLD: 0 /100 WBCS — SIGNIFICANT CHANGE UP (ref 0–0)
NRBC # FLD: 0 K/UL — SIGNIFICANT CHANGE UP (ref 0–0)
PHOSPHATE SERPL-MCNC: 3.3 MG/DL — SIGNIFICANT CHANGE UP (ref 2.5–4.5)
PLATELET # BLD AUTO: 196 K/UL — SIGNIFICANT CHANGE UP (ref 150–400)
POTASSIUM SERPL-MCNC: 3.6 MMOL/L — SIGNIFICANT CHANGE UP (ref 3.5–5.3)
POTASSIUM SERPL-SCNC: 3.6 MMOL/L — SIGNIFICANT CHANGE UP (ref 3.5–5.3)
PROT SERPL-MCNC: 6.8 G/DL — SIGNIFICANT CHANGE UP (ref 6–8.3)
PROTHROM AB SERPL-ACNC: 21.5 SEC — HIGH (ref 9.5–13)
PROTHROM AB SERPL-ACNC: 24.4 SEC — HIGH (ref 9.5–13)
PROTHROM AB SERPL-ACNC: 25.1 SEC — HIGH (ref 9.5–13)
RBC # BLD: 4.56 M/UL — SIGNIFICANT CHANGE UP (ref 4.2–5.8)
RBC # FLD: 22.8 % — HIGH (ref 10.3–14.5)
RH IG SCN BLD-IMP: POSITIVE — SIGNIFICANT CHANGE UP
RH IG SCN BLD-IMP: POSITIVE — SIGNIFICANT CHANGE UP
SODIUM SERPL-SCNC: 133 MMOL/L — LOW (ref 135–145)
WBC # BLD: 8.08 K/UL — SIGNIFICANT CHANGE UP (ref 3.8–10.5)
WBC # FLD AUTO: 8.08 K/UL — SIGNIFICANT CHANGE UP (ref 3.8–10.5)

## 2024-05-20 PROCEDURE — 99223 1ST HOSP IP/OBS HIGH 75: CPT

## 2024-05-20 PROCEDURE — 99233 SBSQ HOSP IP/OBS HIGH 50: CPT | Mod: GC

## 2024-05-20 RX ORDER — DEXTROSE 50 % IN WATER 50 %
12.5 SYRINGE (ML) INTRAVENOUS ONCE
Refills: 0 | Status: COMPLETED | OUTPATIENT
Start: 2024-05-20 | End: 2024-05-20

## 2024-05-20 RX ORDER — INSULIN GLARGINE 100 [IU]/ML
10 INJECTION, SOLUTION SUBCUTANEOUS AT BEDTIME
Refills: 0 | Status: DISCONTINUED | OUTPATIENT
Start: 2024-05-20 | End: 2024-05-21

## 2024-05-20 RX ORDER — ENOXAPARIN SODIUM 100 MG/ML
40 INJECTION SUBCUTANEOUS ONCE
Refills: 0 | Status: COMPLETED | OUTPATIENT
Start: 2024-05-20 | End: 2024-05-20

## 2024-05-20 RX ORDER — PHYTONADIONE (VIT K1) 5 MG
10 TABLET ORAL ONCE
Refills: 0 | Status: COMPLETED | OUTPATIENT
Start: 2024-05-21 | End: 2024-05-21

## 2024-05-20 RX ORDER — SENNA PLUS 8.6 MG/1
2 TABLET ORAL AT BEDTIME
Refills: 0 | Status: DISCONTINUED | OUTPATIENT
Start: 2024-05-20 | End: 2024-05-31

## 2024-05-20 RX ORDER — PETROLATUM,WHITE
1 JELLY (GRAM) TOPICAL EVERY 6 HOURS
Refills: 0 | Status: DISCONTINUED | OUTPATIENT
Start: 2024-05-20 | End: 2024-05-31

## 2024-05-20 RX ORDER — SODIUM CHLORIDE 9 MG/ML
1000 INJECTION, SOLUTION INTRAVENOUS
Refills: 0 | Status: COMPLETED | OUTPATIENT
Start: 2024-05-20 | End: 2024-05-21

## 2024-05-20 RX ORDER — PHYTONADIONE (VIT K1) 5 MG
10 TABLET ORAL ONCE
Refills: 0 | Status: COMPLETED | OUTPATIENT
Start: 2024-05-20 | End: 2024-05-20

## 2024-05-20 RX ORDER — POLYETHYLENE GLYCOL 3350 17 G/17G
17 POWDER, FOR SOLUTION ORAL DAILY
Refills: 0 | Status: DISCONTINUED | OUTPATIENT
Start: 2024-05-20 | End: 2024-05-22

## 2024-05-20 RX ADMIN — URSODIOL 250 MILLIGRAM(S): 250 TABLET, FILM COATED ORAL at 06:10

## 2024-05-20 RX ADMIN — POLYETHYLENE GLYCOL 3350 17 GRAM(S): 17 POWDER, FOR SOLUTION ORAL at 17:12

## 2024-05-20 RX ADMIN — Medication 25 MILLIGRAM(S): at 07:28

## 2024-05-20 RX ADMIN — Medication 5 MILLIGRAM(S): at 14:43

## 2024-05-20 RX ADMIN — CHOLESTYRAMINE 4 GRAM(S): 4 POWDER, FOR SUSPENSION ORAL at 09:42

## 2024-05-20 RX ADMIN — ENOXAPARIN SODIUM 40 MILLIGRAM(S): 100 INJECTION SUBCUTANEOUS at 17:12

## 2024-05-20 RX ADMIN — URSODIOL 250 MILLIGRAM(S): 250 TABLET, FILM COATED ORAL at 17:12

## 2024-05-20 RX ADMIN — Medication 102 MILLIGRAM(S): at 09:42

## 2024-05-20 RX ADMIN — Medication 12.5 GRAM(S): at 05:35

## 2024-05-20 RX ADMIN — SODIUM CHLORIDE 75 MILLILITER(S): 9 INJECTION, SOLUTION INTRAVENOUS at 21:53

## 2024-05-20 RX ADMIN — ATORVASTATIN CALCIUM 40 MILLIGRAM(S): 80 TABLET, FILM COATED ORAL at 21:58

## 2024-05-20 RX ADMIN — ATENOLOL 50 MILLIGRAM(S): 25 TABLET ORAL at 06:11

## 2024-05-20 RX ADMIN — Medication 102 MILLIGRAM(S): at 19:20

## 2024-05-20 RX ADMIN — FAMOTIDINE 40 MILLIGRAM(S): 10 INJECTION INTRAVENOUS at 11:29

## 2024-05-20 RX ADMIN — SODIUM CHLORIDE 75 MILLILITER(S): 9 INJECTION, SOLUTION INTRAVENOUS at 07:17

## 2024-05-20 RX ADMIN — INSULIN GLARGINE 10 UNIT(S): 100 INJECTION, SOLUTION SUBCUTANEOUS at 23:42

## 2024-05-20 RX ADMIN — SENNA PLUS 2 TABLET(S): 8.6 TABLET ORAL at 21:58

## 2024-05-20 NOTE — CONSULT NOTE ADULT - SUBJECTIVE AND OBJECTIVE BOX
Surgical Oncology consult note  --------------------------------------------------------------------------------------------  HPI:  63M PMH HTN, HLD, T2DM (on insulin), hemorrhoid, hyperthyroidism, asthma, GERD, and RAMANA (not on CPAP), surgical history of open cholecystectomy 15 years ago and appendectomy in 1975, who presents with weakness for the last 6 weeks associated with 2 weeks of painless jaundice, malodorous loose stools and poor PO tolerance associated with 25 pound weight loss over the last couple months. He has also noticed he has itchiness throughout his body. He denies any fever or chills. No N/V. Pt denies any chest pain, shortness of breath, palpitations, abdominal pain, melena, BRBPR, dysuria, or LE pain/swelling. Pt has significant family history of malignancy, including in his maternal grandfather, mother (unsure primary - liver and kidneys), and sister (metastatic breast cancer after 9/11 exposure). Pt saw PCP who betty tumor markers CA 19-9 2142,  12 CEA 14.5. He also notes that in the last few weeks, he has had worsening blood glucose control.     GI has been consulted and is planning for ERCP/EUS today (currently NPO).     PAST MEDICAL & SURGICAL HISTORY:  Asthma      HTN (hypertension)      GERD (gastroesophageal reflux disease)      Sleep apnea      Hyperlipidemia      Type 2 diabetes mellitus      History of cholecystectomy      Hyperthyroidism      History of appendectomy        FAMILY HISTORY:  Family history of hyperthyroidism (Child)    [x] Family history as per HPI    SOCIAL HISTORY:  No smoking, no alcohol use, no drug use, no known environmental exposures.     ALLERGIES: No Known Allergies      CURRENT MEDICATIONS  MEDICATIONS (STANDING): atenolol  Tablet 50 milliGRAM(s) Oral daily  atorvastatin 40 milliGRAM(s) Oral at bedtime  cholestyramine Powder (Sugar-Free) 4 Gram(s) Oral daily  dextrose 10% Bolus 125 milliLiter(s) IV Bolus once  dextrose 5% + lactated ringers. 1000 milliLiter(s) IV Continuous <Continuous>  dextrose 5%. 1000 milliLiter(s) IV Continuous <Continuous>  dextrose 5%. 1000 milliLiter(s) IV Continuous <Continuous>  dextrose 50% Injectable 25 Gram(s) IV Push once  dextrose 50% Injectable 12.5 Gram(s) IV Push once  enoxaparin Injectable 40 milliGRAM(s) SubCutaneous once  famotidine    Tablet 40 milliGRAM(s) Oral daily  glucagon  Injectable 1 milliGRAM(s) IntraMuscular once  insulin glargine Injectable (LANTUS) 10 Unit(s) SubCutaneous at bedtime  insulin lispro (ADMELOG) corrective regimen sliding scale   SubCutaneous every 6 hours  phytonadione  IVPB 10 milliGRAM(s) IV Intermittent once  polyethylene glycol 3350 17 Gram(s) Oral daily  senna 2 Tablet(s) Oral at bedtime  ursodiol Tablet 250 milliGRAM(s) Oral two times a day    MEDICATIONS (PRN):acetaminophen     Tablet .. 650 milliGRAM(s) Oral every 6 hours PRN Temp greater or equal to 38C (100.4F), Mild Pain (1 - 3), Moderate Pain (4 - 6), Severe Pain (7 - 10)  albuterol/ipratropium for Nebulization 3 milliLiter(s) Nebulizer every 6 hours PRN Shortness of Breath and/or Wheezing  dextrose Oral Gel 15 Gram(s) Oral once PRN Blood Glucose LESS THAN 70 milliGRAM(s)/deciliter  hydrOXYzine hydrochloride 25 milliGRAM(s) Oral every 8 hours PRN Itching    --------------------------------------------------------------------------------------------    Vitals:   T(C): 36.6 (05-20-24 @ 14:14), Max: 36.7 (05-19-24 @ 21:33)  HR: 55 (05-20-24 @ 14:14) (53 - 67)  BP: 124/77 (05-20-24 @ 14:14) (107/60 - 126/60)  RR: 16 (05-20-24 @ 14:14) (16 - 18)  SpO2: 100% (05-20-24 @ 14:14) (98% - 100%)  CAPILLARY BLOOD GLUCOSE      POCT Blood Glucose.: 93 mg/dL (20 May 2024 11:54)  POCT Blood Glucose.: 87 mg/dL (20 May 2024 06:58)  POCT Blood Glucose.: 127 mg/dL (20 May 2024 05:55)  POCT Blood Glucose.: 56 mg/dL (20 May 2024 05:32)  POCT Blood Glucose.: 55 mg/dL (20 May 2024 05:27)  POCT Blood Glucose.: 100 mg/dL (20 May 2024 00:30)  POCT Blood Glucose.: 141 mg/dL (19 May 2024 22:17)  POCT Blood Glucose.: 120 mg/dL (19 May 2024 18:19)      05-19 @ 07:01  -  05-20 @ 07:00  --------------------------------------------------------  IN:    Oral Fluid: 990 mL  Total IN: 990 mL    OUT:    Voided (mL): 300 mL  Total OUT: 300 mL    Total NET: 690 mL    Height (cm): 177.8 (05-20 @ 08:57)  Weight (kg): 99.8 (05-20 @ 08:57)  BMI (kg/m2): 31.6 (05-20 @ 08:57)  BSA (m2): 2.17 (05-20 @ 08:57)    PHYSICAL EXAM:   General: NAD, Lying in bed comfortably, jaundice. Pt is itching himself throughout exam.   Neuro: Alert and answering questions appropriately   HEENT: Scleral icterus  Cardio: No peripherial edema  Resp: Good effort, no signs of respiratory distress  GI/Abd: Soft, non distended, tender to deep palpation in midepigastrum. Well healed large R costal incision. Obese habitus.   Vascular: All 4 extremities warm  Musculoskeletal: All 4 extremities moving spontaneously, no limitations  --------------------------------------------------------------------------------------------    LABS  CBC (05-20 @ 03:45)                              13.9                           8.08    )----------------(  196        --    % Neutrophils, --    % Lymphocytes, ANC: --                                  39.0    CBC (05-19 @ 06:03)                              13.4                           7.14    )----------------(  191        75.0  % Neutrophils, 15.3  % Lymphocytes, ANC: 5.36                                37.2<L>    BMP (05-20 @ 03:45)             133<L>  |  100     |  17    		Ca++ --      Ca 8.8                ---------------------------------( 65<L> 		Mg 1.90               3.6     |  20<L>   |  0.92  			Ph 3.3     BMP (05-19 @ 06:03)             133<L>  |  97<L>   |  14    		Ca++ --      Ca 9.1                ---------------------------------( 155<H>		Mg 2.00               4.1     |  23      |  0.86  			Ph 2.4<L>    LFTs (05-20 @ 03:45)      TPro 6.8 / Alb 2.5<L> / TBili 25.6<H> / DBili -- / AST 56<H> / ALT 29 / AlkPhos 457<H>  LFTs (05-19 @ 06:03)      TPro 7.1 / Alb 2.8<L> / TBili 27.8<H> / DBili -- / AST 58<H> / ALT 32 / AlkPhos 474<H>    Coags (05-20 @ 11:39)  aPTT -- / INR 2.21<H> / PT 24.4<H>  Coags (05-20 @ 03:45)  aPTT 42.2<H> / INR 2.28<H> / PT 25.1<H>  --------------------------------------------------------------------------------------------    MICROBIOLOGY  Urinalysis (05-20 @ 03:45):     Color:  / Appearance:  / SG:  / pH:  / Gluc: 65<L> / Ketones:  / Bili:  / Urobili:  / Protein : / Nitrites:  / Leuk.Est:  / RBC:  / WBC:  / Sq Epi:  / Non Sq Epi:  / Bacteria        -> Clean Catch Clean Catch (Midstream) Culture (05-17 @ 20:35)     NG    NG    <10,000 CFU/mL Normal Urogenital Vicki  --------------------------------------------------------------------------------------------  IMAGING  ACC: [id number] EXAM:  CT ABDOMEN AND PELVIS IC   ORDERED BY: DAVIDA ANNE     ACC: [id number] [facility]   ORDERED BY: DAVIDA ANNE     PROCEDURE DATE:  05/17/2024          INTERPRETATION:  CLINICAL INFORMATION: Weight loss and jaundice    COMPARISON: None.    CONTRAST/COMPLICATIONS:  IV Contrast: Omnipaque 350 (accession [id number]), Omnipaque 300 (accession   [id number])  90 cc administered   10 cc discarded  Oral Contrast: NONE  Complications: None reported at time of study completion    PROCEDURE:  CT Angiography of the Chest was performed followed by portal venous phase   imaging of the Abdomen and Pelvis.  Sagittal and coronal reformats were performed as well as 3D (MIP)   reconstructions.    FINDINGS:  CHEST:  LUNGS AND LARGE AIRWAYS: No endobronchial lesions. The lungs are clear.   No suspicious pulmonary nodules.  PLEURA: No pleural effusion.  VESSELS: No pulmonary embolism. Atherosclerotic changes of the coronary   arteries and aorta.  HEART: Heart size is normal. No pericardial effusion.  MEDIASTINUM AND DAR: No lymphadenopathy.  CHEST WALL AND LOWER NECK: Within normal limits.    ABDOMEN AND PELVIS:  LIVER: Within normal limits.  BILE DUCTS: Extrahepatic and intrahepatic biliary ductal dilatation.The   common bile duct measures 2.2 cm with abrupt narrowing to the level of   the pancreatic mass.  GALLBLADDER: Cholecystectomy.  SPLEEN: Within normal limits.  PANCREAS: A 3.5 x 4.5 x 3.0 cm heterogenous solid mass in the pancreatic   head/uncinate process (303-52 and 604-70) with upstream main pancreatic   ductal dilatation. The mass abuts the superior mesenteric vein and second   portion of the duodenum. Mass also contacts the proximal SMA along the   left than 180 degrees of its circumference.  ADRENALS: Within normal limits.  KIDNEYS/URETERS: No hydronephrosis. Right renal cysts and subcentimeter   hypodensity too small to characterize.    BLADDER: Mild circumferential wall thickening.  REPRODUCTIVE ORGANS: Prostate within normal limits.    BOWEL: No bowel obstruction. Appendix is not visualized. No evidence of   inflammation in the pericecal region.  PERITONEUM: No ascites.  VESSELS: Atherosclerotic changes.  RETROPERITONEUM/LYMPH NODES: No lymphadenopathy.  ABDOMINAL WALL: Small bilateral fat-containing inguinal hernias.  BONES: Degenerative changes.    IMPRESSION:  No pulmonary embolism.    Pancreatic head/uncinate process mass with upstream pancreatic and   biliary ductal dilatation, suspicious for malignancy. Primary   consideration is pancreatic adenocarcinoma.    Mild urinary bladder wall thickening, which may be on the basis of   cystitis or underdistention. Correlate with urinalysis.    --- End of Report ---    BAL FOSTER MD; Resident Radiologist  This document has been electronically signed.  ALEXEY ARGUETA MD; Attending Radiologist  This document has been electronically signed. May 17 2024  7:29PM    --------------------------------------------------------------------------------------------   Surgical Oncology consult note  --------------------------------------------------------------------------------------------  HPI:  63M PMH HTN, HLD, T2DM (on insulin), hemorrhoid, hyperthyroidism, asthma, GERD, and RAMANA (not on CPAP), surgical history of open cholecystectomy 15 years ago and appendectomy in 1975, who presents with weakness for the last 6 weeks associated with 2 weeks of painless jaundice, malodorous loose stools and poor PO tolerance associated with 25 pound weight loss over the last couple months. He has also noticed he has itchiness throughout his body. He denies any fever or chills. No N/V. Pt denies any chest pain, shortness of breath, palpitations, abdominal pain, melena, BRBPR, dysuria, or LE pain/swelling. Pt has significant family history of malignancy, including in his maternal grandfather, mother (unsure primary - liver and kidneys), and sister (metastatic breast cancer after 9/11 exposure). Pt saw PCP who betty tumor markers CA 19-9 2142,  12 CEA 14.5. He also notes that in the last few weeks, he has had worsening blood glucose control.     GI has been consulted and is planning for ERCP/EUS w/ FNA bx.    PAST MEDICAL & SURGICAL HISTORY:  Asthma      HTN (hypertension)      GERD (gastroesophageal reflux disease)      Sleep apnea      Hyperlipidemia      Type 2 diabetes mellitus      History of cholecystectomy      Hyperthyroidism      History of appendectomy        FAMILY HISTORY:  Family history of hyperthyroidism (Child)    [x] Family history as per HPI    SOCIAL HISTORY:  No smoking, no alcohol use, no drug use, no known environmental exposures.     ALLERGIES: No Known Allergies      CURRENT MEDICATIONS  MEDICATIONS (STANDING): atenolol  Tablet 50 milliGRAM(s) Oral daily  atorvastatin 40 milliGRAM(s) Oral at bedtime  cholestyramine Powder (Sugar-Free) 4 Gram(s) Oral daily  dextrose 10% Bolus 125 milliLiter(s) IV Bolus once  dextrose 5% + lactated ringers. 1000 milliLiter(s) IV Continuous <Continuous>  dextrose 5%. 1000 milliLiter(s) IV Continuous <Continuous>  dextrose 5%. 1000 milliLiter(s) IV Continuous <Continuous>  dextrose 50% Injectable 25 Gram(s) IV Push once  dextrose 50% Injectable 12.5 Gram(s) IV Push once  enoxaparin Injectable 40 milliGRAM(s) SubCutaneous once  famotidine    Tablet 40 milliGRAM(s) Oral daily  glucagon  Injectable 1 milliGRAM(s) IntraMuscular once  insulin glargine Injectable (LANTUS) 10 Unit(s) SubCutaneous at bedtime  insulin lispro (ADMELOG) corrective regimen sliding scale   SubCutaneous every 6 hours  phytonadione  IVPB 10 milliGRAM(s) IV Intermittent once  polyethylene glycol 3350 17 Gram(s) Oral daily  senna 2 Tablet(s) Oral at bedtime  ursodiol Tablet 250 milliGRAM(s) Oral two times a day    MEDICATIONS (PRN):acetaminophen     Tablet .. 650 milliGRAM(s) Oral every 6 hours PRN Temp greater or equal to 38C (100.4F), Mild Pain (1 - 3), Moderate Pain (4 - 6), Severe Pain (7 - 10)  albuterol/ipratropium for Nebulization 3 milliLiter(s) Nebulizer every 6 hours PRN Shortness of Breath and/or Wheezing  dextrose Oral Gel 15 Gram(s) Oral once PRN Blood Glucose LESS THAN 70 milliGRAM(s)/deciliter  hydrOXYzine hydrochloride 25 milliGRAM(s) Oral every 8 hours PRN Itching    --------------------------------------------------------------------------------------------    Vitals:   T(C): 36.6 (05-20-24 @ 14:14), Max: 36.7 (05-19-24 @ 21:33)  HR: 55 (05-20-24 @ 14:14) (53 - 67)  BP: 124/77 (05-20-24 @ 14:14) (107/60 - 126/60)  RR: 16 (05-20-24 @ 14:14) (16 - 18)  SpO2: 100% (05-20-24 @ 14:14) (98% - 100%)  CAPILLARY BLOOD GLUCOSE      POCT Blood Glucose.: 93 mg/dL (20 May 2024 11:54)  POCT Blood Glucose.: 87 mg/dL (20 May 2024 06:58)  POCT Blood Glucose.: 127 mg/dL (20 May 2024 05:55)  POCT Blood Glucose.: 56 mg/dL (20 May 2024 05:32)  POCT Blood Glucose.: 55 mg/dL (20 May 2024 05:27)  POCT Blood Glucose.: 100 mg/dL (20 May 2024 00:30)  POCT Blood Glucose.: 141 mg/dL (19 May 2024 22:17)  POCT Blood Glucose.: 120 mg/dL (19 May 2024 18:19)      05-19 @ 07:01  -  05-20 @ 07:00  --------------------------------------------------------  IN:    Oral Fluid: 990 mL  Total IN: 990 mL    OUT:    Voided (mL): 300 mL  Total OUT: 300 mL    Total NET: 690 mL    Height (cm): 177.8 (05-20 @ 08:57)  Weight (kg): 99.8 (05-20 @ 08:57)  BMI (kg/m2): 31.6 (05-20 @ 08:57)  BSA (m2): 2.17 (05-20 @ 08:57)    PHYSICAL EXAM:   General: NAD, Lying in bed comfortably, jaundice. Pt is itching himself throughout exam.   Neuro: Alert and answering questions appropriately   HEENT: Scleral icterus  Cardio: Regular rate  Resp: Good effort, no signs of respiratory distress  GI/Abd: Soft, non distended, tender to deep palpation in midepigastrum. Well healed large R costal incision. Obese habitus.   Vascular: All 4 extremities warm  Musculoskeletal: All 4 extremities moving spontaneously, no limitations  --------------------------------------------------------------------------------------------    LABS  CBC (05-20 @ 03:45)                              13.9                           8.08    )----------------(  196        --    % Neutrophils, --    % Lymphocytes, ANC: --                                  39.0    CBC (05-19 @ 06:03)                              13.4                           7.14    )----------------(  191        75.0  % Neutrophils, 15.3  % Lymphocytes, ANC: 5.36                                37.2<L>    BMP (05-20 @ 03:45)             133<L>  |  100     |  17    		Ca++ --      Ca 8.8                ---------------------------------( 65<L> 		Mg 1.90               3.6     |  20<L>   |  0.92  			Ph 3.3     BMP (05-19 @ 06:03)             133<L>  |  97<L>   |  14    		Ca++ --      Ca 9.1                ---------------------------------( 155<H>		Mg 2.00               4.1     |  23      |  0.86  			Ph 2.4<L>    LFTs (05-20 @ 03:45)      TPro 6.8 / Alb 2.5<L> / TBili 25.6<H> / DBili -- / AST 56<H> / ALT 29 / AlkPhos 457<H>  LFTs (05-19 @ 06:03)      TPro 7.1 / Alb 2.8<L> / TBili 27.8<H> / DBili -- / AST 58<H> / ALT 32 / AlkPhos 474<H>    Coags (05-20 @ 11:39)  aPTT -- / INR 2.21<H> / PT 24.4<H>  Coags (05-20 @ 03:45)  aPTT 42.2<H> / INR 2.28<H> / PT 25.1<H>  --------------------------------------------------------------------------------------------    MICROBIOLOGY  Urinalysis (05-20 @ 03:45):     Color:  / Appearance:  / SG:  / pH:  / Gluc: 65<L> / Ketones:  / Bili:  / Urobili:  / Protein : / Nitrites:  / Leuk.Est:  / RBC:  / WBC:  / Sq Epi:  / Non Sq Epi:  / Bacteria        -> Clean Catch Clean Catch (Midstream) Culture (05-17 @ 20:35)     NG    NG    <10,000 CFU/mL Normal Urogenital Vicki  --------------------------------------------------------------------------------------------  IMAGING  ACC: [id number] EXAM:  CT ABDOMEN AND PELVIS IC   ORDERED BY: DAVIDA ANNE     ACC: [id number] [facility]   ORDERED BY: DAVIDA ANNE     PROCEDURE DATE:  05/17/2024          INTERPRETATION:  CLINICAL INFORMATION: Weight loss and jaundice    COMPARISON: None.    CONTRAST/COMPLICATIONS:  IV Contrast: Omnipaque 350 (accession [id number]), Omnipaque 300 (accession   [id number])  90 cc administered   10 cc discarded  Oral Contrast: NONE  Complications: None reported at time of study completion    PROCEDURE:  CT Angiography of the Chest was performed followed by portal venous phase   imaging of the Abdomen and Pelvis.  Sagittal and coronal reformats were performed as well as 3D (MIP)   reconstructions.    FINDINGS:  CHEST:  LUNGS AND LARGE AIRWAYS: No endobronchial lesions. The lungs are clear.   No suspicious pulmonary nodules.  PLEURA: No pleural effusion.  VESSELS: No pulmonary embolism. Atherosclerotic changes of the coronary   arteries and aorta.  HEART: Heart size is normal. No pericardial effusion.  MEDIASTINUM AND DAR: No lymphadenopathy.  CHEST WALL AND LOWER NECK: Within normal limits.    ABDOMEN AND PELVIS:  LIVER: Within normal limits.  BILE DUCTS: Extrahepatic and intrahepatic biliary ductal dilatation.The   common bile duct measures 2.2 cm with abrupt narrowing to the level of   the pancreatic mass.  GALLBLADDER: Cholecystectomy.  SPLEEN: Within normal limits.  PANCREAS: A 3.5 x 4.5 x 3.0 cm heterogenous solid mass in the pancreatic   head/uncinate process (303-52 and 604-70) with upstream main pancreatic   ductal dilatation. The mass abuts the superior mesenteric vein and second   portion of the duodenum. Mass also contacts the proximal SMA along the   left than 180 degrees of its circumference.  ADRENALS: Within normal limits.  KIDNEYS/URETERS: No hydronephrosis. Right renal cysts and subcentimeter   hypodensity too small to characterize.    BLADDER: Mild circumferential wall thickening.  REPRODUCTIVE ORGANS: Prostate within normal limits.    BOWEL: No bowel obstruction. Appendix is not visualized. No evidence of   inflammation in the pericecal region.  PERITONEUM: No ascites.  VESSELS: Atherosclerotic changes.  RETROPERITONEUM/LYMPH NODES: No lymphadenopathy.  ABDOMINAL WALL: Small bilateral fat-containing inguinal hernias.  BONES: Degenerative changes.    IMPRESSION:  No pulmonary embolism.    Pancreatic head/uncinate process mass with upstream pancreatic and   biliary ductal dilatation, suspicious for malignancy. Primary   consideration is pancreatic adenocarcinoma.    Mild urinary bladder wall thickening, which may be on the basis of   cystitis or underdistention. Correlate with urinalysis.    --- End of Report ---    BAL FOSTER MD; Resident Radiologist  This document has been electronically signed.  ALEXEY ARGUETA MD; Attending Radiologist  This document has been electronically signed. May 17 2024  7:29PM    --------------------------------------------------------------------------------------------

## 2024-05-20 NOTE — DIETITIAN INITIAL EVALUATION ADULT - ADD RECOMMEND
Advanced diet to consistent carbohydrate, DASH/TLC when medical feasible. Defer food and fluid consistency to SLP/Team.  - Please consistently document % PO intake in nursing flowsheets to assess adequacy of nutritional intake/monitor need for further nutritional intervention(s).   - Monitor weights, diet tolerance, skin integrity, BMs, pertinent labs.   - RDN services remain available as needed.   - Nutrition department to honor food preferences as feasible.

## 2024-05-20 NOTE — DIETITIAN INITIAL EVALUATION ADULT - OTHER INFO
Patient seen at bedside. Pt is NPO at this time pending procedure. Pt reported improve appetite/PO intake in house. As per nursing flowsheet, Pt with variable PO intake 51-76% and % noted. Meal preferences verbalized and updated to [facility]. Pt amenable to Mighty shake reduced sugar 1x daily. Patient denies any nausea, vomiting, diarrhea, constipation or difficulty chewing and swallowing at this time. Skin is intact, no edema noted per nursing flowsheet. No weight hx per [facility]. Admission wt 99.8kg/219.5lbs (5/20). Unable to confirm weight loss at this time. As per Pt 25lbs weight loss x2 weeks. Continue to monitor weight trend.   Pt with poorly controlled DM2. Pt with one episode of Hypoglycemia, Dextrose in place.  Labs noted for HgA1c 9.0% (5/18), hyponatremia. Pt educated on consistent carbohydrate diet and understanding.  Patient seen at bedside. Pt is NPO at this time pending procedure. Pt reported improve appetite/PO intake in house. As per nursing flowsheet, Pt with variable PO intake 51-76% and % noted. Meal preferences verbalized and updated to [facility]. Pt amenable to Mighty shake reduced sugar 1x daily. Patient denies any nausea, vomiting, diarrhea, constipation or difficulty chewing and swallowing at this time. Skin is intact, no edema noted per nursing flowsheet. [facility] wt 99.8kg/219.5lbs (5/20). Pt reported UBW 212lbs. Weight increased (3.5%) unknown time frame. Continue to monitor weight trend.  Pt with poorly controlled DM2. Pt with one episode of Hypoglycemia, Dextrose in place.  Labs noted for HgA1c 9.0% (5/18), hyponatremia. Pt educated on consistent carbohydrate diet and understanding.

## 2024-05-20 NOTE — DIETITIAN INITIAL EVALUATION ADULT - REASON FOR ADMISSION
Per chart, Pt is 63 years old male with PMH: HTN, HLD, type 2 DM, hyperthyroidism, asthma, GERD, and RAMANA (not on CPAP) presents with 2 weeks of painless jaundice, found to have  a 3.5 x 4.5 x 3.0 cm heterogenous solid mass in the pancreatic head/uncinate process with extrahepatic and intrahepatic biliary dilatation. Admit diagnosis other pancreatic disorder.

## 2024-05-20 NOTE — PROGRESS NOTE ADULT - PROBLEM SELECTOR PLAN 1
Pt with 2 weeks of painless jaundice, associated with 25-lb weight loss, foamy dark urine, and changes to his bowel habits and stool caliber for the past 1 month. LFTs significant for t bili 29.5, direct bili >20, alk phos 532, AST 71, and ALT 38, consistent with cholestasis in setting of pancreatic mass causing extrahepatic and intrahepatic biliary ductal dilatation, including CBD dilation to 2.2 cm.  - f/u GI recs  - daily LFTs  - Low threshold to start antibiotics if pt spikes fever, has worsening leukocytosis, or increasing hyperbilirubinemia due to concern for cholangitis given CBD dilatation   - Start Atarax 25 mg q8hrs PRN for itching and cholestyramine 4 g daily  - C/w famotidine 40 mg daily Pt with 2 weeks of painless jaundice, associated with 25-lb weight loss, foamy dark urine, and changes to his bowel habits and stool caliber for the past 1 month. LFTs significant for t bili 29.5, direct bili >20, alk phos 532, AST 71, and ALT 38, consistent with cholestasis in setting of pancreatic mass causing extrahepatic and intrahepatic biliary ductal dilatation, including CBD dilation to 2.2 cm.  - f/u GI recs  - daily LFTs  - Low threshold to start antibiotics if pt spikes fever, has worsening leukocytosis, or increasing hyperbilirubinemia due to concern for cholangitis given CBD dilatation   - Start Atarax 25 mg q8hrs PRN for itching and cholestyramine 4 g daily  - C/w famotidine 40 mg daily  - see below

## 2024-05-20 NOTE — DIETITIAN INITIAL EVALUATION ADULT - ORAL INTAKE PTA/DIET HISTORY
Pt reported decreased appetite/PO intake 2/2 lack of taste. Pt reported consuming a regular diet at baseline. NKFA  reported. Pt reported UBW 212lbs. Of note, as per H&P patient reported 25lbs weight loss x2 weeks. Not reported to writer.   Pt reported decreased appetite/PO intake 2/2 lack of taste. Pt reported consuming a regular diet at baseline. NKFA  reported. Pt reported UBW 212lbs.

## 2024-05-20 NOTE — DIETITIAN INITIAL EVALUATION ADULT - PERTINENT MEDS FT
MEDICATIONS  (STANDING):  atenolol  Tablet 50 milliGRAM(s) Oral daily  atorvastatin 40 milliGRAM(s) Oral at bedtime  cholestyramine Powder (Sugar-Free) 4 Gram(s) Oral daily  dextrose 10% Bolus 125 milliLiter(s) IV Bolus once  dextrose 5% + lactated ringers. 1000 milliLiter(s) (75 mL/Hr) IV Continuous <Continuous>  dextrose 5%. 1000 milliLiter(s) (50 mL/Hr) IV Continuous <Continuous>  dextrose 5%. 1000 milliLiter(s) (100 mL/Hr) IV Continuous <Continuous>  dextrose 50% Injectable 12.5 Gram(s) IV Push once  dextrose 50% Injectable 25 Gram(s) IV Push once  famotidine    Tablet 40 milliGRAM(s) Oral daily  glucagon  Injectable 1 milliGRAM(s) IntraMuscular once  insulin glargine Injectable (LANTUS) 20 Unit(s) SubCutaneous at bedtime  insulin lispro (ADMELOG) corrective regimen sliding scale   SubCutaneous every 6 hours  ursodiol Tablet 250 milliGRAM(s) Oral two times a day    MEDICATIONS  (PRN):  acetaminophen     Tablet .. 650 milliGRAM(s) Oral every 6 hours PRN Temp greater or equal to 38C (100.4F), Mild Pain (1 - 3), Moderate Pain (4 - 6), Severe Pain (7 - 10)  albuterol/ipratropium for Nebulization 3 milliLiter(s) Nebulizer every 6 hours PRN Shortness of Breath and/or Wheezing  dextrose Oral Gel 15 Gram(s) Oral once PRN Blood Glucose LESS THAN 70 milliGRAM(s)/deciliter  hydrOXYzine hydrochloride 25 milliGRAM(s) Oral every 8 hours PRN Itching

## 2024-05-20 NOTE — DIETITIAN INITIAL EVALUATION ADULT - PHYSCIAL ASSESSMENT
BMI reflective of physical appearance.  Pt with no overt signs of muscle wasting/fat loss upon visualization.

## 2024-05-20 NOTE — DIETITIAN INITIAL EVALUATION ADULT - PROBLEM SELECTOR PLAN 7
Pt states that he now takes Lantus 50 u qHS and Admelog 50 u qAM before breakfast at home.  - C/w Lantus but at lower dose of 40 u qHS. Adjust dose as needed.  - Start moderate-dose ISS and FS checks qAC TID and qHS  - Check A1c  - Corrected serum Na for hyperglycemia 136, monitor serum Na

## 2024-05-20 NOTE — PROGRESS NOTE ADULT - SUBJECTIVE AND OBJECTIVE BOX
INTERVAL HPI/OVERNIGHT EVENTS:    SUBJECTIVE: Patient seen and examined at bedside.         OBJECTIVE:    VITAL SIGNS:  ICU Vital Signs Last 24 Hrs  T(C): 36.6 (20 May 2024 05:35), Max: 36.9 (19 May 2024 15:42)  T(F): 97.8 (20 May 2024 05:35), Max: 98.4 (19 May 2024 15:42)  HR: 67 (20 May 2024 05:35) (61 - 86)  BP: 111/55 (20 May 2024 05:35) (111/55 - 139/83)  BP(mean): --  ABP: --  ABP(mean): --  RR: 18 (20 May 2024 05:35) (18 - 18)  SpO2: 98% (20 May 2024 05:35) (98% - 100%)    O2 Parameters below as of 20 May 2024 05:35  Patient On (Oxygen Delivery Method): room air            Plateau pressure:   P/F ratio:     05-19 @ 07:01  -  05-20 @ 07:00  --------------------------------------------------------  IN: 990 mL / OUT: 300 mL / NET: 690 mL      CAPILLARY BLOOD GLUCOSE      POCT Blood Glucose.: 87 mg/dL (20 May 2024 06:58)    ECG:    PHYSICAL EXAM:    General: NAD  HEENT: clear conjunctiva  Neck: supple  Respiratory: CTA b/l  Cardiovascular: +S1/S2; RRR  Abdomen: soft, NT/ND; +BS x4  Extremities: 2+ peripheral pulses b/l; no LE edema  Skin: normal color and turgor; no rash  Neurological:    MEDICATIONS:  MEDICATIONS  (STANDING):  atenolol  Tablet 50 milliGRAM(s) Oral daily  atorvastatin 40 milliGRAM(s) Oral at bedtime  cholestyramine Powder (Sugar-Free) 4 Gram(s) Oral daily  dextrose 10% Bolus 125 milliLiter(s) IV Bolus once  dextrose 5% + lactated ringers. 1000 milliLiter(s) (75 mL/Hr) IV Continuous <Continuous>  dextrose 5%. 1000 milliLiter(s) (50 mL/Hr) IV Continuous <Continuous>  dextrose 5%. 1000 milliLiter(s) (100 mL/Hr) IV Continuous <Continuous>  dextrose 50% Injectable 12.5 Gram(s) IV Push once  dextrose 50% Injectable 25 Gram(s) IV Push once  enoxaparin Injectable 40 milliGRAM(s) SubCutaneous every 24 hours  famotidine    Tablet 40 milliGRAM(s) Oral daily  glucagon  Injectable 1 milliGRAM(s) IntraMuscular once  insulin glargine Injectable (LANTUS) 20 Unit(s) SubCutaneous at bedtime  insulin lispro (ADMELOG) corrective regimen sliding scale   SubCutaneous every 6 hours  ursodiol Tablet 250 milliGRAM(s) Oral two times a day    MEDICATIONS  (PRN):  acetaminophen     Tablet .. 650 milliGRAM(s) Oral every 6 hours PRN Temp greater or equal to 38C (100.4F), Mild Pain (1 - 3), Moderate Pain (4 - 6), Severe Pain (7 - 10)  albuterol/ipratropium for Nebulization 3 milliLiter(s) Nebulizer every 6 hours PRN Shortness of Breath and/or Wheezing  dextrose Oral Gel 15 Gram(s) Oral once PRN Blood Glucose LESS THAN 70 milliGRAM(s)/deciliter  hydrOXYzine hydrochloride 25 milliGRAM(s) Oral every 8 hours PRN Itching      LABS:                        13.9   8.08  )-----------( 196      ( 20 May 2024 03:45 )             39.0     05-20    133<L>  |  100  |  17  ----------------------------<  65<L>  3.6   |  20<L>  |  0.92    Ca    8.8      20 May 2024 03:45  Phos  3.3     05-20  Mg     1.90     05-20    TPro  6.8  /  Alb  2.5<L>  /  TBili  25.6<H>  /  DBili  x   /  AST  56<H>  /  ALT  29  /  AlkPhos  457<H>  05-20    PT/INR - ( 20 May 2024 03:45 )   PT: 25.1 sec;   INR: 2.28 ratio         PTT - ( 20 May 2024 03:45 )  PTT:42.2 sec  Urinalysis Basic - ( 20 May 2024 03:45 )    Color: x / Appearance: x / SG: x / pH: x  Gluc: 65 mg/dL / Ketone: x  / Bili: x / Urobili: x   Blood: x / Protein: x / Nitrite: x   Leuk Esterase: x / RBC: x / WBC x   Sq Epi: x / Non Sq Epi: x / Bacteria: x        RADIOLOGY & ADDITIONAL TESTS: Reviewed.     INTERVAL HPI/OVERNIGHT EVENTS:    SUBJECTIVE: Patient seen and examined at bedside. GILL. Reports distressed about GI procedure that might be postponed given elevated INR. Reports nausea in AM when his POCG was very low 50s. No dizziness/lightheadness/syncope        OBJECTIVE:    VITAL SIGNS:  Vital Signs Last 24 Hrs  T(C): 36.6 (20 May 2024 05:35), Max: 36.9 (19 May 2024 15:42)  T(F): 97.8 (20 May 2024 05:35), Max: 98.4 (19 May 2024 15:42)  HR: 67 (20 May 2024 05:35) (61 - 86)  BP: 111/55 (20 May 2024 05:35) (111/55 - 139/83)  BP(mean): --  ABP: --  ABP(mean): --  RR: 18 (20 May 2024 05:35) (18 - 18)  SpO2: 98% (20 May 2024 05:35) (98% - 100%)    O2 Parameters below as of 20 May 2024 05:35  Patient On (Oxygen Delivery Method): room air            Plateau pressure:   P/F ratio:     05-19 @ 07:01  -  05-20 @ 07:00  --------------------------------------------------------  IN: 990 mL / OUT: 300 mL / NET: 690 mL      CAPILLARY BLOOD GLUCOSE      POCT Blood Glucose.: 87 mg/dL (20 May 2024 06:58)    PHYSICAL EXAM:   CONSTITUTIONAL: NAD  HEENT: NC/AT, lip with xanthoma, jaundice   RESPIRATORY: Normal respiratory effort; lungs are clear to auscultation bilaterally  CARDIOVASCULAR: Regular rate and rhythm, normal S1 and S2, no murmur/rub/gallop; No lower extremity edema; Peripheral pulses are 2+ bilaterally  ABDOMEN: Nontender to palpation, normoactive bowel sounds, no rebound/guarding; No hepatosplenomegaly  MUSCLOSKELETAL: no clubbing or cyanosis of digits; no joint swelling or tenderness to palpation  EXTREMITIES: no peripheral edema, distal pulses intact   NEURO: no focal deficits   PSYCH: A+O to person, place, and time; affect appropriate    MEDICATIONS:  MEDICATIONS  (STANDING):  atenolol  Tablet 50 milliGRAM(s) Oral daily  atorvastatin 40 milliGRAM(s) Oral at bedtime  cholestyramine Powder (Sugar-Free) 4 Gram(s) Oral daily  dextrose 10% Bolus 125 milliLiter(s) IV Bolus once  dextrose 5% + lactated ringers. 1000 milliLiter(s) (75 mL/Hr) IV Continuous <Continuous>  dextrose 5%. 1000 milliLiter(s) (50 mL/Hr) IV Continuous <Continuous>  dextrose 5%. 1000 milliLiter(s) (100 mL/Hr) IV Continuous <Continuous>  dextrose 50% Injectable 12.5 Gram(s) IV Push once  dextrose 50% Injectable 25 Gram(s) IV Push once  enoxaparin Injectable 40 milliGRAM(s) SubCutaneous every 24 hours  famotidine    Tablet 40 milliGRAM(s) Oral daily  glucagon  Injectable 1 milliGRAM(s) IntraMuscular once  insulin glargine Injectable (LANTUS) 20 Unit(s) SubCutaneous at bedtime  insulin lispro (ADMELOG) corrective regimen sliding scale   SubCutaneous every 6 hours  ursodiol Tablet 250 milliGRAM(s) Oral two times a day    MEDICATIONS  (PRN):  acetaminophen     Tablet .. 650 milliGRAM(s) Oral every 6 hours PRN Temp greater or equal to 38C (100.4F), Mild Pain (1 - 3), Moderate Pain (4 - 6), Severe Pain (7 - 10)  albuterol/ipratropium for Nebulization 3 milliLiter(s) Nebulizer every 6 hours PRN Shortness of Breath and/or Wheezing  dextrose Oral Gel 15 Gram(s) Oral once PRN Blood Glucose LESS THAN 70 milliGRAM(s)/deciliter  hydrOXYzine hydrochloride 25 milliGRAM(s) Oral every 8 hours PRN Itching      LABS:                        13.9   8.08  )-----------( 196      ( 20 May 2024 03:45 )             39.0     05-20    133<L>  |  100  |  17  ----------------------------<  65<L>  3.6   |  20<L>  |  0.92    Ca    8.8      20 May 2024 03:45  Phos  3.3     05-20  Mg     1.90     05-20    TPro  6.8  /  Alb  2.5<L>  /  TBili  25.6<H>  /  DBili  x   /  AST  56<H>  /  ALT  29  /  AlkPhos  457<H>  05-20    PT/INR - ( 20 May 2024 03:45 )   PT: 25.1 sec;   INR: 2.28 ratio         PTT - ( 20 May 2024 03:45 )  PTT:42.2 sec  Urinalysis Basic - ( 20 May 2024 03:45 )    Color: x / Appearance: x / SG: x / pH: x  Gluc: 65 mg/dL / Ketone: x  / Bili: x / Urobili: x   Blood: x / Protein: x / Nitrite: x   Leuk Esterase: x / RBC: x / WBC x   Sq Epi: x / Non Sq Epi: x / Bacteria: x        RADIOLOGY & ADDITIONAL TESTS: Reviewed.

## 2024-05-20 NOTE — DIETITIAN INITIAL EVALUATION ADULT - PERTINENT LABORATORY DATA
05-20    133<L>  |  100  |  17  ----------------------------<  65<L>  3.6   |  20<L>  |  0.92    Ca    8.8      20 May 2024 03:45  Phos  3.3     05-20  Mg     1.90     05-20    TPro  6.8  /  Alb  2.5<L>  /  TBili  25.6<H>  /  DBili  x   /  AST  56<H>  /  ALT  29  /  AlkPhos  457<H>  05-20  CAPILLARY BLOOD GLUCOSE  POCT Blood Glucose.: 93 mg/dL (20 May 2024 11:54)  POCT Blood Glucose.: 87 mg/dL (20 May 2024 06:58)  POCT Blood Glucose.: 127 mg/dL (20 May 2024 05:55)  POCT Blood Glucose.: 56 mg/dL (20 May 2024 05:32)  POCT Blood Glucose.: 55 mg/dL (20 May 2024 05:27)  POCT Blood Glucose.: 100 mg/dL (20 May 2024 00:30)  POCT Blood Glucose.: 141 mg/dL (19 May 2024 22:17)  POCT Blood Glucose.: 120 mg/dL (19 May 2024 18:19)  POCT Blood Glucose.: 115 mg/dL (19 May 2024 13:11)  A1C with Estimated Average Glucose Result: 9.0 % (05-18-24 @ 06:35)

## 2024-05-20 NOTE — CONSULT NOTE ADULT - ASSESSMENT
63M PMH HTN, T2DM (on insulin), hemorrhoid, hyperthyroidism, asthma, GERD, and RAMANA (not on CPAP), surgical history of open cholecystectomy 15 years ago and appendectomy in 1975, p/w 6 weeks of progressive fatigue, painless jaundice, malodorous loose stools, worsening glycemic control, weight loss, pruritus who was found to have a pancreatic head/uncinate neck mass with upstream pancreatic and biliary ductal dilatation with elevated tumor CA-19-9 and CEA concerning for pancreatic adenocarcinoma.    GI has been consulted and is planning for ERCP/EUS today (currently NPO).    ***Incomplete Note***    PLAN:    - Will follow up results of planned ERCP/EUS with biopsy/brushing of pancreatic head mass for tissue diagnosis  - Patient will likely need further outpatient workup  - Plan to be discussed with Attending, Dr. Jose Angel Guevara  63M PMH HTN, T2DM (on insulin), hemorrhoid, hyperthyroidism, asthma, GERD, and RAMANA (not on CPAP), surgical history of open cholecystectomy 15 years ago and appendectomy in 1975, p/w 6 weeks of progressive fatigue, painless jaundice, malodorous loose stools, worsening glycemic control, weight loss, pruritus who was found to have a pancreatic head/uncinate neck mass with upstream pancreatic and biliary ductal dilatation with elevated tumor CA-19-9 and CEA concerning for pancreatic adenocarcinoma.      PLAN:    - Will follow up results of planned ERCP/EUS with FNA bx of pancreatic head mass for tissue diagnosis  - CT C/A/P w/o radiologic evidence of mets  - Patient will likely need outpatient follow up for further surg onc workup  - Appreciate heme/onc recommendations  - Surg onc will follow      Patient discussed with surg onc attending, Dr. Guevara.    Laura Calvillo, PGY-2  E Team Surgery  [id number]

## 2024-05-20 NOTE — DIETITIAN INITIAL EVALUATION ADULT - PERSON TAUGHT/METHOD
Pt provided with written and verbal nutrition education on type 2 DM diet. Topics discussed include: MyPlate healthy eating guidelines, avoidance of sugar sweetened beverages and recommended alternatives, sources of carbohydrates, carbohydrate counting, complex vs simple carbohydrates, inclusion of whole grains and fiber, mixed meals (pairing protein with carbohydrate for optimal blood glucose response), hypoglycemia prevention/management and timing of meals/snacks. Discussed importance of self monitoring blood glucose and encouraged outpatient endocrinology follow up. Pt verbalized understanding of nutrition education./verbal instruction/written material/patient instructed

## 2024-05-20 NOTE — PROGRESS NOTE ADULT - PROBLEM SELECTOR PLAN 7
Pt states that he now takes Lantus 50 u qHS and Admelog 50 u qAM before breakfast at home.  - C/w Lantus but at lower dose of 40 u qHS. Adjust dose as needed.  - Start moderate-dose ISS and FS checks qAC TID and qHS  - Check A1c Pt states that he now takes Lantus 50 u qHS and Admelog 50 u qAM before breakfast at home.  - C/w Lantus but at lower dose of 40 u qHS. Adjust dose as needed.  - Start moderate-dose ISS and FS checks qAC TID and qHS  - Check A1c  NEW  hypoglycemic overnight after being NPO despite glargine dose reduction   careful titration once resumes diet, currently on D5 iso NPO and hypoglycemia

## 2024-05-20 NOTE — DIETITIAN INITIAL EVALUATION ADULT - NS FNS DIET ORDER
Diet, NPO after Midnight:      NPO Start Date: 19-May-2024,   NPO Start Time: 23:59  Except Medications (05-19-24 @ 08:13) [Active]  Diet, DASH/TLC:   Sodium & Cholesterol Restricted  Consistent Carbohydrate {No Snacks} (CSTCHO) (05-18-24 @ 06:55) [Active]

## 2024-05-20 NOTE — PROGRESS NOTE ADULT - PROBLEM SELECTOR PLAN 2
CTAP with IV contrast demonstrating a 3.5 x 4.5 x 3.0 cm heterogenous solid mass in the pancreatic head/uncinate process with upstream main pancreatic ductal dilatation as well as extrahepatic and intrahepatic biliary ductal dilatation, including CBD dilation to 2.2 cm. Suspicious for pancreatic malignancy.    - F/u GI consult for possible ERCP/EUS with FNA biopsy  - Surgery consult to be called in AM  - Check CA 19-9,  and CEA  - Check IgG4 to r/o autoimmune pancreatitis  - Plan as above for cholestatic jaundice  - Oncology evaluation once tissue diagnosis obtained CTAP with IV contrast demonstrating a 3.5 x 4.5 x 3.0 cm heterogenous solid mass in the pancreatic head/uncinate process with upstream main pancreatic ductal dilatation as well as extrahepatic and intrahepatic biliary ductal dilatation, including CBD dilation to 2.2 cm. Suspicious for pancreatic malignancy.    - F/u GI consult for possible ERCP/EUS with FNA biopsy  - Surgery consult to be called in AM  - Check CA 19-9,  and CEA  - Check IgG4 to r/o autoimmune pancreatitis  - Plan as above for cholestatic jaundice  - Oncology evaluation once tissue diagnosis obtained  NEW  GI recommending surg onc consult for resection  ERCP/EUS/FNB today if INR<2 after vitamin K treatment, otherwise tmrw

## 2024-05-21 LAB
ADD ON TEST-SPECIMEN IN LAB: SIGNIFICANT CHANGE UP
ADD ON TEST-SPECIMEN IN LAB: SIGNIFICANT CHANGE UP
ALBUMIN SERPL ELPH-MCNC: 2.2 G/DL — LOW (ref 3.3–5)
ALP SERPL-CCNC: 444 U/L — HIGH (ref 40–120)
ALT FLD-CCNC: 29 U/L — SIGNIFICANT CHANGE UP (ref 4–41)
ANION GAP SERPL CALC-SCNC: 11 MMOL/L — SIGNIFICANT CHANGE UP (ref 7–14)
APTT 50/50 2HOUR INCUB: 38.4 SEC — HIGH (ref 24.5–36.6)
APTT BLD: 34.5 SEC — SIGNIFICANT CHANGE UP (ref 27.5–37.4)
APTT BLD: 44.9 SEC — HIGH (ref 24.5–35.6)
APTT BLD: 44.9 SEC — HIGH (ref 24.5–35.6)
AST SERPL-CCNC: 63 U/L — HIGH (ref 4–40)
BILIRUB SERPL-MCNC: 23.4 MG/DL — HIGH (ref 0.2–1.2)
BUN SERPL-MCNC: 15 MG/DL — SIGNIFICANT CHANGE UP (ref 7–23)
CALCIUM SERPL-MCNC: 8.4 MG/DL — SIGNIFICANT CHANGE UP (ref 8.4–10.5)
CHLORIDE SERPL-SCNC: 100 MMOL/L — SIGNIFICANT CHANGE UP (ref 98–107)
CO2 SERPL-SCNC: 22 MMOL/L — SIGNIFICANT CHANGE UP (ref 22–31)
CREAT SERPL-MCNC: 1.01 MG/DL — SIGNIFICANT CHANGE UP (ref 0.5–1.3)
EGFR: 83 ML/MIN/1.73M2 — SIGNIFICANT CHANGE UP
GLUCOSE BLDC GLUCOMTR-MCNC: 111 MG/DL — HIGH (ref 70–99)
GLUCOSE BLDC GLUCOMTR-MCNC: 116 MG/DL — HIGH (ref 70–99)
GLUCOSE BLDC GLUCOMTR-MCNC: 118 MG/DL — HIGH (ref 70–99)
GLUCOSE BLDC GLUCOMTR-MCNC: 121 MG/DL — HIGH (ref 70–99)
GLUCOSE BLDC GLUCOMTR-MCNC: 204 MG/DL — HIGH (ref 70–99)
GLUCOSE BLDC GLUCOMTR-MCNC: 63 MG/DL — LOW (ref 70–99)
GLUCOSE BLDC GLUCOMTR-MCNC: 64 MG/DL — LOW (ref 70–99)
GLUCOSE BLDC GLUCOMTR-MCNC: 97 MG/DL — SIGNIFICANT CHANGE UP (ref 70–99)
GLUCOSE SERPL-MCNC: 133 MG/DL — HIGH (ref 70–99)
HCT VFR BLD CALC: 36.1 % — LOW (ref 39–50)
HGB BLD-MCNC: 13 G/DL — SIGNIFICANT CHANGE UP (ref 13–17)
INR BLD: 1.94 RATIO — HIGH (ref 0.85–1.18)
MAGNESIUM SERPL-MCNC: 1.9 MG/DL — SIGNIFICANT CHANGE UP (ref 1.6–2.6)
MCHC RBC-ENTMCNC: 30.7 PG — SIGNIFICANT CHANGE UP (ref 27–34)
MCHC RBC-ENTMCNC: 36 GM/DL — SIGNIFICANT CHANGE UP (ref 32–36)
MCV RBC AUTO: 85.3 FL — SIGNIFICANT CHANGE UP (ref 80–100)
NRBC # BLD: 0 /100 WBCS — SIGNIFICANT CHANGE UP (ref 0–0)
NRBC # FLD: 0 K/UL — SIGNIFICANT CHANGE UP (ref 0–0)
PHOSPHATE SERPL-MCNC: 2.8 MG/DL — SIGNIFICANT CHANGE UP (ref 2.5–4.5)
PLATELET # BLD AUTO: 205 K/UL — SIGNIFICANT CHANGE UP (ref 150–400)
POTASSIUM SERPL-MCNC: 3.6 MMOL/L — SIGNIFICANT CHANGE UP (ref 3.5–5.3)
POTASSIUM SERPL-SCNC: 3.6 MMOL/L — SIGNIFICANT CHANGE UP (ref 3.5–5.3)
PROT SERPL-MCNC: 6.3 G/DL — SIGNIFICANT CHANGE UP (ref 6–8.3)
PROTHROM AB SERPL-ACNC: 21.3 SEC — HIGH (ref 9.5–13)
PT 50/50: 11.7 SEC — SIGNIFICANT CHANGE UP (ref 9.5–14)
RBC # BLD: 4.23 M/UL — SIGNIFICANT CHANGE UP (ref 4.2–5.8)
RBC # FLD: 22.8 % — HIGH (ref 10.3–14.5)
SODIUM SERPL-SCNC: 133 MMOL/L — LOW (ref 135–145)
WBC # BLD: 8.06 K/UL — SIGNIFICANT CHANGE UP (ref 3.8–10.5)
WBC # FLD AUTO: 8.06 K/UL — SIGNIFICANT CHANGE UP (ref 3.8–10.5)

## 2024-05-21 PROCEDURE — 43238 EGD US FINE NEEDLE BX/ASPIR: CPT | Mod: GC

## 2024-05-21 PROCEDURE — 88307 TISSUE EXAM BY PATHOLOGIST: CPT | Mod: 26

## 2024-05-21 PROCEDURE — 43239 EGD BIOPSY SINGLE/MULTIPLE: CPT | Mod: 59,GC

## 2024-05-21 PROCEDURE — 88112 CYTOPATH CELL ENHANCE TECH: CPT | Mod: 26

## 2024-05-21 PROCEDURE — 43264 ERCP REMOVE DUCT CALCULI: CPT | Mod: GC,59

## 2024-05-21 PROCEDURE — 99233 SBSQ HOSP IP/OBS HIGH 50: CPT | Mod: GC

## 2024-05-21 PROCEDURE — 88305 TISSUE EXAM BY PATHOLOGIST: CPT | Mod: 26

## 2024-05-21 PROCEDURE — 88360 TUMOR IMMUNOHISTOCHEM/MANUAL: CPT | Mod: 26,1L

## 2024-05-21 PROCEDURE — 43274 ERCP DUCT STENT PLACEMENT: CPT | Mod: GC

## 2024-05-21 PROCEDURE — 74328 X-RAY BILE DUCT ENDOSCOPY: CPT | Mod: 26

## 2024-05-21 DEVICE — GWIRE JAG REVOLUTION 260CM: Type: IMPLANTABLE DEVICE | Status: FUNCTIONAL

## 2024-05-21 DEVICE — STENT BIL WALL RX FC RMV US 10X60MM: Type: IMPLANTABLE DEVICE | Status: FUNCTIONAL

## 2024-05-21 DEVICE — CATH BLLN EXTRACT DIST GUIDE WIRE 15MM 3LUM: Type: IMPLANTABLE DEVICE | Status: FUNCTIONAL

## 2024-05-21 DEVICE — GWIRE JAGTOME REVOLUTION RX 260CM/0.025IN: Type: IMPLANTABLE DEVICE | Status: FUNCTIONAL

## 2024-05-21 RX ORDER — FENTANYL CITRATE 50 UG/ML
50 INJECTION INTRAVENOUS
Refills: 0 | Status: DISCONTINUED | OUTPATIENT
Start: 2024-05-21 | End: 2024-05-21

## 2024-05-21 RX ORDER — PHYTONADIONE (VIT K1) 5 MG
5 TABLET ORAL ONCE
Refills: 0 | Status: COMPLETED | OUTPATIENT
Start: 2024-05-21 | End: 2024-05-21

## 2024-05-21 RX ORDER — ONDANSETRON 8 MG/1
4 TABLET, FILM COATED ORAL ONCE
Refills: 0 | Status: DISCONTINUED | OUTPATIENT
Start: 2024-05-21 | End: 2024-05-21

## 2024-05-21 RX ORDER — INSULIN GLARGINE 100 [IU]/ML
10 INJECTION, SOLUTION SUBCUTANEOUS AT BEDTIME
Refills: 0 | Status: DISCONTINUED | OUTPATIENT
Start: 2024-05-21 | End: 2024-05-22

## 2024-05-21 RX ORDER — INSULIN LISPRO 100/ML
VIAL (ML) SUBCUTANEOUS
Refills: 0 | Status: DISCONTINUED | OUTPATIENT
Start: 2024-05-21 | End: 2024-05-21

## 2024-05-21 RX ORDER — FENTANYL CITRATE 50 UG/ML
25 INJECTION INTRAVENOUS
Refills: 0 | Status: DISCONTINUED | OUTPATIENT
Start: 2024-05-21 | End: 2024-05-21

## 2024-05-21 RX ORDER — INSULIN LISPRO 100/ML
VIAL (ML) SUBCUTANEOUS AT BEDTIME
Refills: 0 | Status: DISCONTINUED | OUTPATIENT
Start: 2024-05-21 | End: 2024-05-22

## 2024-05-21 RX ORDER — DEXTROSE 50 % IN WATER 50 %
25 SYRINGE (ML) INTRAVENOUS ONCE
Refills: 0 | Status: COMPLETED | OUTPATIENT
Start: 2024-05-21 | End: 2024-05-21

## 2024-05-21 RX ORDER — INSULIN LISPRO 100/ML
VIAL (ML) SUBCUTANEOUS
Refills: 0 | Status: DISCONTINUED | OUTPATIENT
Start: 2024-05-21 | End: 2024-05-22

## 2024-05-21 RX ADMIN — URSODIOL 250 MILLIGRAM(S): 250 TABLET, FILM COATED ORAL at 19:06

## 2024-05-21 RX ADMIN — INSULIN GLARGINE 10 UNIT(S): 100 INJECTION, SOLUTION SUBCUTANEOUS at 22:29

## 2024-05-21 RX ADMIN — SENNA PLUS 2 TABLET(S): 8.6 TABLET ORAL at 22:09

## 2024-05-21 RX ADMIN — CHOLESTYRAMINE 4 GRAM(S): 4 POWDER, FOR SUSPENSION ORAL at 09:44

## 2024-05-21 RX ADMIN — SODIUM CHLORIDE 75 MILLILITER(S): 9 INJECTION, SOLUTION INTRAVENOUS at 05:34

## 2024-05-21 RX ADMIN — Medication 25 MILLIGRAM(S): at 22:09

## 2024-05-21 RX ADMIN — Medication 102 MILLIGRAM(S): at 05:34

## 2024-05-21 RX ADMIN — Medication 25 MILLIGRAM(S): at 00:16

## 2024-05-21 RX ADMIN — ATORVASTATIN CALCIUM 40 MILLIGRAM(S): 80 TABLET, FILM COATED ORAL at 22:11

## 2024-05-21 RX ADMIN — Medication 25 GRAM(S): at 11:52

## 2024-05-21 RX ADMIN — Medication 5 MILLIGRAM(S): at 01:09

## 2024-05-21 NOTE — PROGRESS NOTE ADULT - SUBJECTIVE AND OBJECTIVE BOX
*******************************  Ava Helm MD (PGY-1)  Internal Medicine  Contact via [employer] TEAMS  *******************************    ADA COX  63y  Male    Patient is a 63y old  Male who presents with a chief complaint of Jaundice, pancreatic mass (20 May 2024 15:55)      Subjective:    Objective:  T(C): 37.1 (05-21-24 @ 05:32), Max: 37.1 (05-21-24 @ 05:32)  HR: 60 (05-21-24 @ 05:51) (53 - 63)  BP: 106/52 (05-21-24 @ 05:51) (106/52 - 134/58)  RR: 17 (05-21-24 @ 05:32) (16 - 18)  SpO2: 97% (05-21-24 @ 05:32) (97% - 100%)  I&O's Summary    20 May 2024 07:01  -  21 May 2024 07:00  --------------------------------------------------------  IN: 0 mL / OUT: 800 mL / NET: -800 mL        PHYSICAL EXAM:  GENERAL: NAD  HEAD:  Atraumatic, Normocephalic  EYES: EOMI, PERRLA, conjunctiva and sclera clear  ENMT: Moist mucous membranes  NECK: Supple, No JVD, trachea midline   NERVOUS SYSTEM:  Alert & Oriented X3, Good concentration; Motor Strength 5/5 B/L upper and lower extremities; DTRs 2+ intact and symmetric  CHEST/LUNG: Clear to auscultation bilaterally; No rales, rhonchi, wheezing, or rubs  HEART: Regular rate and rhythm; No murmurs, rubs, or gallops  ABDOMEN: Soft, Nontender, Nondistended; Bowel sounds present  EXTREMITIES:  2+ Peripheral Pulses, No clubbing, cyanosis, or edema  LYMPH: No lymphadenopathy noted  SKIN: No rashes or lesions    MEDICATIONS  (STANDING):  atenolol  Tablet 50 milliGRAM(s) Oral daily  atorvastatin 40 milliGRAM(s) Oral at bedtime  cholestyramine Powder (Sugar-Free) 4 Gram(s) Oral daily  dextrose 10% Bolus 125 milliLiter(s) IV Bolus once  dextrose 5%. 1000 milliLiter(s) (50 mL/Hr) IV Continuous <Continuous>  dextrose 5%. 1000 milliLiter(s) (100 mL/Hr) IV Continuous <Continuous>  dextrose 50% Injectable 12.5 Gram(s) IV Push once  dextrose 50% Injectable 25 Gram(s) IV Push once  famotidine    Tablet 40 milliGRAM(s) Oral daily  glucagon  Injectable 1 milliGRAM(s) IntraMuscular once  insulin glargine Injectable (LANTUS) 10 Unit(s) SubCutaneous at bedtime  insulin lispro (ADMELOG) corrective regimen sliding scale   SubCutaneous every 6 hours  polyethylene glycol 3350 17 Gram(s) Oral daily  senna 2 Tablet(s) Oral at bedtime  ursodiol Tablet 250 milliGRAM(s) Oral two times a day    MEDICATIONS  (PRN):  acetaminophen     Tablet .. 650 milliGRAM(s) Oral every 6 hours PRN Temp greater or equal to 38C (100.4F), Mild Pain (1 - 3), Moderate Pain (4 - 6), Severe Pain (7 - 10)  albuterol/ipratropium for Nebulization 3 milliLiter(s) Nebulizer every 6 hours PRN Shortness of Breath and/or Wheezing  dextrose Oral Gel 15 Gram(s) Oral once PRN Blood Glucose LESS THAN 70 milliGRAM(s)/deciliter  hydrOXYzine hydrochloride 25 milliGRAM(s) Oral every 8 hours PRN Itching  petrolatum white Ointment 1 Application(s) Topical every 6 hours PRN Dry lips      LABS:        CAPILLARY BLOOD GLUCOSE      POCT Blood Glucose.: 118 mg/dL (21 May 2024 05:24)  POCT Blood Glucose.: 132 mg/dL (20 May 2024 23:38)  POCT Blood Glucose.: 104 mg/dL (20 May 2024 18:11)  POCT Blood Glucose.: 93 mg/dL (20 May 2024 11:54)      RADIOLOGY & ADDITIONAL TESTS:               *******************************  Ava Helm MD (PGY-1)  Internal Medicine  Contact via [employer] TEAMS  *******************************    ADA COX  63y  Male    Patient is a 63y old  Male who presents with a chief complaint of Jaundice, pancreatic mass (20 May 2024 15:55)    Subjective: No acute events overnight. Patient seen at bedside this morning. Endorsed generalized itching. Denied any fever, chills, chest pain, sob, ab pain, n/v.     Objective:  T(C): 37.1 (05-21-24 @ 05:32), Max: 37.1 (05-21-24 @ 05:32)  HR: 60 (05-21-24 @ 05:51) (53 - 63)  BP: 106/52 (05-21-24 @ 05:51) (106/52 - 134/58)  RR: 17 (05-21-24 @ 05:32) (16 - 18)  SpO2: 97% (05-21-24 @ 05:32) (97% - 100%)  I&O's Summary    20 May 2024 07:01  -  21 May 2024 07:00  --------------------------------------------------------  IN: 0 mL / OUT: 800 mL / NET: -800 mL    PHYSICAL EXAM:  GENERAL: NAD  HEAD:  Atraumatic, Normocephalic  EYES: EOMI, PERRLA, sclera icterus  ENMT: Moist mucous membranes  NECK: Supple, No JVD, trachea midline   NERVOUS SYSTEM:  Alert & Oriented X3, Good concentration  CHEST/LUNG: Clear to auscultation bilaterally; No rales, rhonchi, wheezing, or rubs  HEART: Regular rate and rhythm; No murmurs, rubs, or gallops  ABDOMEN: Soft, Nontender, Nondistended; Bowel sounds present  EXTREMITIES:  2+ Peripheral Pulses, No clubbing, cyanosis, or edema  SKIN: jaundice    MEDICATIONS  (STANDING):  atenolol  Tablet 50 milliGRAM(s) Oral daily  atorvastatin 40 milliGRAM(s) Oral at bedtime  cholestyramine Powder (Sugar-Free) 4 Gram(s) Oral daily  dextrose 10% Bolus 125 milliLiter(s) IV Bolus once  dextrose 5%. 1000 milliLiter(s) (50 mL/Hr) IV Continuous <Continuous>  dextrose 5%. 1000 milliLiter(s) (100 mL/Hr) IV Continuous <Continuous>  dextrose 50% Injectable 12.5 Gram(s) IV Push once  dextrose 50% Injectable 25 Gram(s) IV Push once  famotidine    Tablet 40 milliGRAM(s) Oral daily  glucagon  Injectable 1 milliGRAM(s) IntraMuscular once  insulin glargine Injectable (LANTUS) 10 Unit(s) SubCutaneous at bedtime  insulin lispro (ADMELOG) corrective regimen sliding scale   SubCutaneous every 6 hours  polyethylene glycol 3350 17 Gram(s) Oral daily  senna 2 Tablet(s) Oral at bedtime  ursodiol Tablet 250 milliGRAM(s) Oral two times a day    MEDICATIONS  (PRN):  acetaminophen     Tablet .. 650 milliGRAM(s) Oral every 6 hours PRN Temp greater or equal to 38C (100.4F), Mild Pain (1 - 3), Moderate Pain (4 - 6), Severe Pain (7 - 10)  albuterol/ipratropium for Nebulization 3 milliLiter(s) Nebulizer every 6 hours PRN Shortness of Breath and/or Wheezing  dextrose Oral Gel 15 Gram(s) Oral once PRN Blood Glucose LESS THAN 70 milliGRAM(s)/deciliter  hydrOXYzine hydrochloride 25 milliGRAM(s) Oral every 8 hours PRN Itching  petrolatum white Ointment 1 Application(s) Topical every 6 hours PRN Dry lips    LABS:                        13.0   8.06  )-----------( 205      ( 21 May 2024 04:20 )             36.1     05-21    133<L>  |  100  |  15  ----------------------------<  133<H>  3.6   |  22  |  1.01    Ca    8.4      21 May 2024 04:20  Phos  2.8     05-21  Mg     1.90     05-21    TPro  6.3  /  Alb  2.2<L>  /  TBili  23.4<H>  /  DBili  x   /  AST  63<H>  /  ALT  29  /  AlkPhos  444<H>  05-21        PT/INR - ( 21 May 2024 04:20 )   PT: 21.3 sec;   INR: 1.94 ratio    PTT - ( 21 May 2024 04:20 )  PTT:44.9 sec    Culture Results:   <10,000 CFU/mL Normal Urogenital Vicki (05-17 @ 20:35)    CAPILLARY BLOOD GLUCOSE  POCT Blood Glucose.: 116 mg/dL (21 May 2024 12:04)  POCT Blood Glucose.: 118 mg/dL (21 May 2024 05:24)  POCT Blood Glucose.: 132 mg/dL (20 May 2024 23:38)  POCT Blood Glucose.: 104 mg/dL (20 May 2024 18:11)  POCT Blood Glucose.: 93 mg/dL (20 May 2024 11:54)    RADIOLOGY & ADDITIONAL TESTS:

## 2024-05-21 NOTE — PROGRESS NOTE ADULT - PROBLEM SELECTOR PROBLEM 9
Vaccine Information Statement HPV (Human Papillomavirus) Vaccine: What You Need to Know Many Vaccine Information Statements are available in [place] and other languages. See www.immunize.org/vis Hojas de información sobre vacunas están disponibles en español y en muchos otros idiomas. Visite www.immunize.org/vis 1. Why get vaccinated? HPV (Human papillomavirus) vaccine can prevent infection with some types of human papillomavirus. HPV infections can cause certain types of cancers including:  cervical, vaginal and vulvar cancers in women,  
 penile cancer in men, and 
 anal cancers in both men and women. HPV vaccine prevents infection from the HPV types that cause over 90% of these cancers. HPV is spread through intimate skin-to-skin or sexual contact. HPV infections are so common that nearly all men and women will get at least one type of HPV at some time in their lives. Most HPV infections go away by themselves within 2 years. But sometimes HPV infections will last longer and can cause cancers later in life. 2. HPV vaccine HPV vaccine is routinely recommended for adolescents at 6or 15years of age to ensure they are protected before they are exposed to the virus. HPV vaccine may be given beginning at age 5 years, and as late as age 39 years. Most people older than 26 years will not benefit from HPV vaccination. Talk with your health care provider if you want more information. Most children who get the first dose before 13years of age need 2 doses of HPV vaccine. Anyone who gets the first dose on or after 13years of age, and younger people with certain immunocompromising conditions, need 3 doses. Your health care provider can give you more information. HPV vaccine may be given at the same time as other vaccines. 3. Talk with your health care provider Tell your vaccine provider if the person getting the vaccine: 
 Has had an allergic reaction after a previous dose of HPV vaccine, or has any severe, life-threatening allergies.  Is pregnant. In some cases, your health care provider may decide to postpone HPV vaccination to a future visit. People with minor illnesses, such as a cold, may be vaccinated. People who are moderately or severely ill should usually wait until they recover before getting HPV vaccine. Your health care provider can give you more information. 4. Risks of a vaccine reaction  Soreness, redness, or swelling where the shot is given can happen after HPV vaccine.  Fever or headache can happen after HPV vaccine. People sometimes faint after medical procedures, including vaccination. Tell your provider if you feel dizzy or have vision changes or ringing in the ears. As with any medicine, there is a very remote chance of a vaccine causing a severe allergic reaction, other serious injury, or death. 5. What if there is a serious problem? An allergic reaction could occur after the vaccinated person leaves the clinic. If you see signs of a severe allergic reaction (hives, swelling of the face and throat, difficulty breathing, a fast heartbeat, dizziness, or weakness), call 9-1-1 and get the person to the nearest hospital. 
 
For other signs that concern you, call your health care provider. Adverse reactions should be reported to the Vaccine Adverse Event Reporting System (VAERS). Your health care provider will usually file this report, or you can do it yourself. Visit the VAERS website at www.vaers. hhs.gov or call 4-336.384.7743. VAERS is only for reporting reactions, and VAERS staff do not give medical advice. 6. The National Vaccine Injury Compensation Program 
 
The [facility] Vaccine Injury Compensation Program (VICP) is a federal program that was created to compensate people who may have been injured by certain vaccines.  Visit the VICP website at www.hrsa.gov/vaccinecompensation or call 3-756.792.7495 to learn about the program and about filing a claim. There is a time limit to file a claim for compensation. 7. How can I learn more?  Ask your health care provider.  Call your local or state health department.  Contact the Centers for Disease Control and Prevention (CDC): 
- Call 0-286.178.4500 (1-800-CDC-INFO) or 
- Visit CDCs website at www.cdc.gov/vaccines Vaccine Information Statement (Interim) HPV Vaccine 10/30/2019 
42 CELESTINO Palomares [id number]-[id number] Department of Health and [place] Centers for Disease Control and Prevention Office Use Only Asthma

## 2024-05-21 NOTE — PROGRESS NOTE ADULT - PROBLEM SELECTOR PLAN 2
CTAP with IV contrast demonstrating a 3.5 x 4.5 x 3.0 cm heterogenous solid mass in the pancreatic head/uncinate process with upstream main pancreatic ductal dilatation as well as extrahepatic and intrahepatic biliary ductal dilatation, including CBD dilation to 2.2 cm. Suspicious for pancreatic malignancy.    - F/u GI consult for possible ERCP/EUS with FNA biopsy  - Surgery consult to be called in AM  - Check CA 19-9,  and CEA  - Check IgG4 to r/o autoimmune pancreatitis  - Plan as above for cholestatic jaundice  - Oncology evaluation once tissue diagnosis obtained  NEW  GI recommending surg onc consult for resection  ERCP/EUS/FNB today if INR<2 after vitamin K treatment, otherwise tmrw CTAP with IV contrast demonstrating a 3.5 x 4.5 x 3.0 cm heterogenous solid mass in the pancreatic head/uncinate process with upstream main pancreatic ductal dilatation as well as extrahepatic and intrahepatic biliary ductal dilatation, including CBD dilation to 2.2 cm. Suspicious for pancreatic malignancy.  Elevated CA 19-9 and CEA.     - surg onc consulted -> f/u biopsy results   - Plan as above for cholestatic jaundice  - will need outpatient onc eval

## 2024-05-21 NOTE — PROGRESS NOTE ADULT - PROBLEM SELECTOR PLAN 12
- DVT ppx: Lovenox SQ 40 mg daily, hold prior to any procedure  - Diet: DASH/TLC/CC - DVT ppx: Lovenox SQ 40 mg daily, hold prior to any procedure  - Diet: NPO for ERCP/EUS today   - dispo: pending clinical course

## 2024-05-21 NOTE — PROGRESS NOTE ADULT - PROBLEM SELECTOR PLAN 3
Pt with serum HCO3 19 with AG 18 on admission. On VBG, pH 7.35 and lactate 3.1. Possibly from type B lactic acidosis in setting of pancreatic malignancy. Unlikely to be DKA, as BHB negative and UA negative for ketones.   - Monitor CMP and repeat VBG with lactate in AM Pt with serum HCO3 19 with AG 18 on admission. On VBG, pH 7.35 and lactate 3.1. Possibly from type B lactic acidosis in setting of pancreatic malignancy. Unlikely to be DKA, as BHB negative and UA negative for ketones.     RESOLVED

## 2024-05-21 NOTE — PROGRESS NOTE ADULT - PROBLEM SELECTOR PLAN 1
Pt with 2 weeks of painless jaundice, associated with 25-lb weight loss, foamy dark urine, and changes to his bowel habits and stool caliber for the past 1 month. LFTs significant for t bili 29.5, direct bili >20, alk phos 532, AST 71, and ALT 38, consistent with cholestasis in setting of pancreatic mass causing extrahepatic and intrahepatic biliary ductal dilatation, including CBD dilation to 2.2 cm.  - f/u GI recs  - daily LFTs  - Low threshold to start antibiotics if pt spikes fever, has worsening leukocytosis, or increasing hyperbilirubinemia due to concern for cholangitis given CBD dilatation   - Start Atarax 25 mg q8hrs PRN for itching and cholestyramine 4 g daily  - C/w famotidine 40 mg daily  - see below Pt with 2 weeks of painless jaundice, associated with 25-lb weight loss, foamy dark urine, and changes to his bowel habits and stool caliber for the past 1 month. LFTs significant for t bili 29.5, direct bili >20, alk phos 532, AST 71, and ALT 38, consistent with cholestasis in setting of pancreatic mass causing extrahepatic and intrahepatic biliary ductal dilatation, including CBD dilation to 2.2 cm.    - f/u GI recs -> ERCP/EUS today 5/21   - daily LFTs  - Low threshold to start antibiotics if pt spikes fever, has worsening leukocytosis, or increasing hyperbilirubinemia due to concern for cholangitis given CBD dilatation   - continue Atarax 25 mg q8hrs PRN for itching and cholestyramine 4 g daily, urosodiol 250mg bid   - C/w famotidine 40 mg daily

## 2024-05-21 NOTE — PROGRESS NOTE ADULT - PROBLEM SELECTOR PLAN 4
WBC 10.83 on admission. Pt afebrile, did not meet SIRS criteria. Possibly from malignancy.   - Trend CBC with diff  - Low threshold to start antibiotics if pt spikes fever, has worsening leukocytosis, or increasing hyperbilirubinemia due to concern for cholangitis given CBD dilatation WBC 10.83 on admission. Pt afebrile, did not meet SIRS criteria. Possibly from malignancy.     RESOLVED     - daily cbc

## 2024-05-21 NOTE — PROGRESS NOTE ADULT - PROBLEM SELECTOR PLAN 7
Pt states that he now takes Lantus 50 u qHS and Admelog 50 u qAM before breakfast at home.  - C/w Lantus but at lower dose of 40 u qHS. Adjust dose as needed.  - Start moderate-dose ISS and FS checks qAC TID and qHS  - Check A1c  NEW  hypoglycemic overnight after being NPO despite glargine dose reduction   careful titration once resumes diet, currently on D5 iso NPO and hypoglycemia Pt states that he now takes Lantus 50 u qHS and Admelog 50 u qAM before breakfast at home.   A1c 9%. Episodes of hypoglycemia while NPO    - C/w Lantus 10 mg qhs   - low dose insulin sliding scale premeal and bedtime  - hypoglycemia protocol

## 2024-05-21 NOTE — PROGRESS NOTE ADULT - PROBLEM SELECTOR PLAN 5
BPs in acceptable range on admission. Pt on atenolol 50 mg daily and some other BP med that pt cannot remember the name of at this time (?lisinopril 20 mg daily).  - C/w atenolol 50 mg daily   - Complete Med Rec in AM with pt's family and/or pharmacy   - Monitor VS q4hrs BPs in acceptable range on admission. Pt on atenolol 50 mg daily and some other BP med that pt cannot remember the name of at this time (?lisinopril 20 mg daily).  - C/w atenolol 50 mg daily   - med rec   - Monitor VS q8hrs

## 2024-05-22 ENCOUNTER — TRANSCRIPTION ENCOUNTER (OUTPATIENT)
Age: 64
End: 2024-05-22

## 2024-05-22 DIAGNOSIS — Z86.39 PERSONAL HISTORY OF OTHER ENDOCRINE, NUTRITIONAL AND METABOLIC DISEASE: ICD-10-CM

## 2024-05-22 DIAGNOSIS — E11.65 TYPE 2 DIABETES MELLITUS WITH HYPERGLYCEMIA: ICD-10-CM

## 2024-05-22 LAB
ALBUMIN SERPL ELPH-MCNC: 2.6 G/DL — LOW (ref 3.3–5)
ALP SERPL-CCNC: 509 U/L — HIGH (ref 40–120)
ALT FLD-CCNC: 32 U/L — SIGNIFICANT CHANGE UP (ref 4–41)
ANION GAP SERPL CALC-SCNC: 15 MMOL/L — HIGH (ref 7–14)
ANION GAP SERPL CALC-SCNC: 16 MMOL/L — HIGH (ref 7–14)
ANISOCYTOSIS BLD QL: SLIGHT — SIGNIFICANT CHANGE UP
APTT BLD: 39 SEC — HIGH (ref 24.5–35.6)
AST SERPL-CCNC: 97 U/L — HIGH (ref 4–40)
B-OH-BUTYR SERPL-SCNC: <0 MMOL/L — SIGNIFICANT CHANGE UP (ref 0–0.4)
BASE EXCESS BLDV CALC-SCNC: -3.3 MMOL/L — LOW (ref -2–3)
BASOPHILS # BLD AUTO: 0 K/UL — SIGNIFICANT CHANGE UP (ref 0–0.2)
BASOPHILS NFR BLD AUTO: 0 % — SIGNIFICANT CHANGE UP (ref 0–2)
BILIRUB SERPL-MCNC: 24 MG/DL — HIGH (ref 0.2–1.2)
BUN SERPL-MCNC: 20 MG/DL — SIGNIFICANT CHANGE UP (ref 7–23)
BUN SERPL-MCNC: 30 MG/DL — HIGH (ref 7–23)
CA-I SERPL-SCNC: 1.08 MMOL/L — LOW (ref 1.15–1.33)
CALCIUM SERPL-MCNC: 8.1 MG/DL — LOW (ref 8.4–10.5)
CALCIUM SERPL-MCNC: 8.5 MG/DL — SIGNIFICANT CHANGE UP (ref 8.4–10.5)
CHLORIDE BLDV-SCNC: 97 MMOL/L — SIGNIFICANT CHANGE UP (ref 96–108)
CHLORIDE SERPL-SCNC: 93 MMOL/L — LOW (ref 98–107)
CHLORIDE SERPL-SCNC: 96 MMOL/L — LOW (ref 98–107)
CO2 BLDV-SCNC: 22.4 MMOL/L — SIGNIFICANT CHANGE UP (ref 22–26)
CO2 SERPL-SCNC: 18 MMOL/L — LOW (ref 22–31)
CO2 SERPL-SCNC: 19 MMOL/L — LOW (ref 22–31)
CREAT SERPL-MCNC: 1.2 MG/DL — SIGNIFICANT CHANGE UP (ref 0.5–1.3)
CREAT SERPL-MCNC: 1.64 MG/DL — HIGH (ref 0.5–1.3)
EGFR: 46 ML/MIN/1.73M2 — LOW
EGFR: 68 ML/MIN/1.73M2 — SIGNIFICANT CHANGE UP
EOSINOPHIL # BLD AUTO: 0 K/UL — SIGNIFICANT CHANGE UP (ref 0–0.5)
EOSINOPHIL NFR BLD AUTO: 0 % — SIGNIFICANT CHANGE UP (ref 0–6)
GAS PNL BLDV: 124 MMOL/L — LOW (ref 136–145)
GAS PNL BLDV: SIGNIFICANT CHANGE UP
GLUCOSE BLDC GLUCOMTR-MCNC: 213 MG/DL — HIGH (ref 70–99)
GLUCOSE BLDC GLUCOMTR-MCNC: 263 MG/DL — HIGH (ref 70–99)
GLUCOSE BLDC GLUCOMTR-MCNC: 330 MG/DL — HIGH (ref 70–99)
GLUCOSE BLDC GLUCOMTR-MCNC: 371 MG/DL — HIGH (ref 70–99)
GLUCOSE BLDV-MCNC: 376 MG/DL — HIGH (ref 70–99)
GLUCOSE SERPL-MCNC: 342 MG/DL — HIGH (ref 70–99)
GLUCOSE SERPL-MCNC: 378 MG/DL — HIGH (ref 70–99)
HCO3 BLDV-SCNC: 21 MMOL/L — LOW (ref 22–29)
HCT VFR BLD CALC: 37.4 % — LOW (ref 39–50)
HCT VFR BLDA CALC: 37 % — LOW (ref 39–51)
HGB BLD CALC-MCNC: 12.2 G/DL — LOW (ref 12.6–17.4)
HGB BLD-MCNC: 13.2 G/DL — SIGNIFICANT CHANGE UP (ref 13–17)
IANC: 6.85 K/UL — SIGNIFICANT CHANGE UP (ref 1.8–7.4)
INR BLD: 1.87 RATIO — HIGH (ref 0.85–1.18)
LACTATE BLDV-MCNC: 2.4 MMOL/L — HIGH (ref 0.5–2)
LACTATE BLDV-MCNC: 2.5 MMOL/L — HIGH (ref 0.5–2)
LYMPHOCYTES # BLD AUTO: 0.45 K/UL — LOW (ref 1–3.3)
LYMPHOCYTES # BLD AUTO: 6 % — LOW (ref 13–44)
MACROCYTES BLD QL: SLIGHT — SIGNIFICANT CHANGE UP
MAGNESIUM SERPL-MCNC: 2 MG/DL — SIGNIFICANT CHANGE UP (ref 1.6–2.6)
MCHC RBC-ENTMCNC: 30.5 PG — SIGNIFICANT CHANGE UP (ref 27–34)
MCHC RBC-ENTMCNC: 35.3 GM/DL — SIGNIFICANT CHANGE UP (ref 32–36)
MCV RBC AUTO: 86.4 FL — SIGNIFICANT CHANGE UP (ref 80–100)
METAMYELOCYTES # FLD: 0.8 % — SIGNIFICANT CHANGE UP (ref 0–1)
MONOCYTES # BLD AUTO: 0.19 K/UL — SIGNIFICANT CHANGE UP (ref 0–0.9)
MONOCYTES NFR BLD AUTO: 2.6 % — SIGNIFICANT CHANGE UP (ref 2–14)
NEUTROPHILS # BLD AUTO: 6.77 K/UL — SIGNIFICANT CHANGE UP (ref 1.8–7.4)
NEUTROPHILS NFR BLD AUTO: 90.6 % — HIGH (ref 43–77)
PCO2 BLDV: 36 MMHG — LOW (ref 42–55)
PH BLDV: 7.38 — SIGNIFICANT CHANGE UP (ref 7.32–7.43)
PHOSPHATE SERPL-MCNC: 3.3 MG/DL — SIGNIFICANT CHANGE UP (ref 2.5–4.5)
PLAT MORPH BLD: ABNORMAL
PLATELET # BLD AUTO: 242 K/UL — SIGNIFICANT CHANGE UP (ref 150–400)
PLATELET COUNT - ESTIMATE: NORMAL — SIGNIFICANT CHANGE UP
PO2 BLDV: 62 MMHG — HIGH (ref 25–45)
POLYCHROMASIA BLD QL SMEAR: SIGNIFICANT CHANGE UP
POTASSIUM BLDV-SCNC: 4.2 MMOL/L — SIGNIFICANT CHANGE UP (ref 3.5–5.1)
POTASSIUM SERPL-MCNC: 3.9 MMOL/L — SIGNIFICANT CHANGE UP (ref 3.5–5.3)
POTASSIUM SERPL-MCNC: 4.2 MMOL/L — SIGNIFICANT CHANGE UP (ref 3.5–5.3)
POTASSIUM SERPL-SCNC: 3.9 MMOL/L — SIGNIFICANT CHANGE UP (ref 3.5–5.3)
POTASSIUM SERPL-SCNC: 4.2 MMOL/L — SIGNIFICANT CHANGE UP (ref 3.5–5.3)
PROT SERPL-MCNC: 6.8 G/DL — SIGNIFICANT CHANGE UP (ref 6–8.3)
PROTHROM AB SERPL-ACNC: 20.8 SEC — HIGH (ref 9.5–13)
RBC # BLD: 4.33 M/UL — SIGNIFICANT CHANGE UP (ref 4.2–5.8)
RBC # FLD: 22.8 % — HIGH (ref 10.3–14.5)
RBC BLD AUTO: ABNORMAL
SAO2 % BLDV: 92.1 % — HIGH (ref 67–88)
SODIUM SERPL-SCNC: 127 MMOL/L — LOW (ref 135–145)
SODIUM SERPL-SCNC: 130 MMOL/L — LOW (ref 135–145)
TARGETS BLD QL SMEAR: SLIGHT — SIGNIFICANT CHANGE UP
WBC # BLD: 7.47 K/UL — SIGNIFICANT CHANGE UP (ref 3.8–10.5)
WBC # FLD AUTO: 7.47 K/UL — SIGNIFICANT CHANGE UP (ref 3.8–10.5)

## 2024-05-22 PROCEDURE — 99223 1ST HOSP IP/OBS HIGH 75: CPT

## 2024-05-22 PROCEDURE — 99232 SBSQ HOSP IP/OBS MODERATE 35: CPT | Mod: GC

## 2024-05-22 PROCEDURE — 99232 SBSQ HOSP IP/OBS MODERATE 35: CPT

## 2024-05-22 PROCEDURE — 99233 SBSQ HOSP IP/OBS HIGH 50: CPT | Mod: GC

## 2024-05-22 RX ORDER — INSULIN GLARGINE 100 [IU]/ML
20 INJECTION, SOLUTION SUBCUTANEOUS AT BEDTIME
Refills: 0 | Status: DISCONTINUED | OUTPATIENT
Start: 2024-05-22 | End: 2024-05-22

## 2024-05-22 RX ORDER — POLYETHYLENE GLYCOL 3350 17 G/17G
17 POWDER, FOR SOLUTION ORAL DAILY
Refills: 0 | Status: DISCONTINUED | OUTPATIENT
Start: 2024-05-22 | End: 2024-05-31

## 2024-05-22 RX ORDER — INSULIN GLARGINE 100 [IU]/ML
15 INJECTION, SOLUTION SUBCUTANEOUS AT BEDTIME
Refills: 0 | Status: DISCONTINUED | OUTPATIENT
Start: 2024-05-22 | End: 2024-05-23

## 2024-05-22 RX ORDER — INSULIN LISPRO 100/ML
VIAL (ML) SUBCUTANEOUS
Refills: 0 | Status: DISCONTINUED | OUTPATIENT
Start: 2024-05-22 | End: 2024-05-28

## 2024-05-22 RX ORDER — ENOXAPARIN SODIUM 100 MG/ML
40 INJECTION SUBCUTANEOUS EVERY 24 HOURS
Refills: 0 | Status: DISCONTINUED | OUTPATIENT
Start: 2024-05-22 | End: 2024-05-22

## 2024-05-22 RX ORDER — INSULIN LISPRO 100/ML
VIAL (ML) SUBCUTANEOUS
Refills: 0 | Status: DISCONTINUED | OUTPATIENT
Start: 2024-05-22 | End: 2024-05-22

## 2024-05-22 RX ORDER — HEPARIN SODIUM 5000 [USP'U]/ML
5000 INJECTION INTRAVENOUS; SUBCUTANEOUS EVERY 8 HOURS
Refills: 0 | Status: DISCONTINUED | OUTPATIENT
Start: 2024-05-22 | End: 2024-05-28

## 2024-05-22 RX ORDER — BENZOCAINE AND MENTHOL 5; 1 G/100ML; G/100ML
1 LIQUID ORAL ONCE
Refills: 0 | Status: COMPLETED | OUTPATIENT
Start: 2024-05-22 | End: 2024-05-22

## 2024-05-22 RX ORDER — INSULIN LISPRO 100/ML
VIAL (ML) SUBCUTANEOUS AT BEDTIME
Refills: 0 | Status: DISCONTINUED | OUTPATIENT
Start: 2024-05-22 | End: 2024-05-31

## 2024-05-22 RX ORDER — SIMETHICONE 80 MG/1
80 TABLET, CHEWABLE ORAL ONCE
Refills: 0 | Status: COMPLETED | OUTPATIENT
Start: 2024-05-22 | End: 2024-05-22

## 2024-05-22 RX ORDER — INSULIN LISPRO 100/ML
5 VIAL (ML) SUBCUTANEOUS
Refills: 0 | Status: DISCONTINUED | OUTPATIENT
Start: 2024-05-22 | End: 2024-05-23

## 2024-05-22 RX ADMIN — ENOXAPARIN SODIUM 40 MILLIGRAM(S): 100 INJECTION SUBCUTANEOUS at 17:57

## 2024-05-22 RX ADMIN — Medication 5 UNIT(S): at 17:57

## 2024-05-22 RX ADMIN — FAMOTIDINE 40 MILLIGRAM(S): 10 INJECTION INTRAVENOUS at 11:03

## 2024-05-22 RX ADMIN — Medication 3: at 17:58

## 2024-05-22 RX ADMIN — Medication 25 MILLIGRAM(S): at 23:08

## 2024-05-22 RX ADMIN — ATORVASTATIN CALCIUM 40 MILLIGRAM(S): 80 TABLET, FILM COATED ORAL at 22:42

## 2024-05-22 RX ADMIN — URSODIOL 250 MILLIGRAM(S): 250 TABLET, FILM COATED ORAL at 17:58

## 2024-05-22 RX ADMIN — INSULIN GLARGINE 15 UNIT(S): 100 INJECTION, SOLUTION SUBCUTANEOUS at 22:43

## 2024-05-22 RX ADMIN — URSODIOL 250 MILLIGRAM(S): 250 TABLET, FILM COATED ORAL at 06:36

## 2024-05-22 RX ADMIN — BENZOCAINE AND MENTHOL 1 LOZENGE: 5; 1 LIQUID ORAL at 04:24

## 2024-05-22 RX ADMIN — SENNA PLUS 2 TABLET(S): 8.6 TABLET ORAL at 22:42

## 2024-05-22 RX ADMIN — SIMETHICONE 80 MILLIGRAM(S): 80 TABLET, CHEWABLE ORAL at 23:10

## 2024-05-22 RX ADMIN — CHOLESTYRAMINE 4 GRAM(S): 4 POWDER, FOR SUSPENSION ORAL at 08:53

## 2024-05-22 RX ADMIN — Medication 8: at 13:05

## 2024-05-22 RX ADMIN — ATENOLOL 50 MILLIGRAM(S): 25 TABLET ORAL at 06:36

## 2024-05-22 RX ADMIN — Medication 5: at 08:52

## 2024-05-22 NOTE — PROGRESS NOTE ADULT - PROBLEM SELECTOR PLAN 12
- DVT ppx: Lovenox SQ 40 mg daily, hold prior to any procedure  - Diet: NPO for ERCP/EUS today   - dispo: pending clinical course

## 2024-05-22 NOTE — PROGRESS NOTE ADULT - PROBLEM SELECTOR PLAN 5
BPs in acceptable range on admission. Pt on atenolol 50 mg daily and some other BP med that pt cannot remember the name of at this time (?lisinopril 20 mg daily).  - C/w atenolol 50 mg daily   - med rec   - Monitor VS q8hrs

## 2024-05-22 NOTE — DISCHARGE NOTE PROVIDER - NSDCFUADDAPPT_GEN_ALL_CORE_FT
Gastroenterology Clinic to confirm appointment; [phone] ([facility] at [address] [facility]) or [phone] ([facility] at [address] [place] [address]) or [phone] ([facility] at [address] [facility]).  Gastroenterology Clinic to confirm appointment; [phone] ([facility] at [address] [facility]) or [phone] ([facility] at [facility]) or [phone] ([facility] at [address] [facility]).     [facility] [address] [facility] [address] [place], [place] [id number] [phone]

## 2024-05-22 NOTE — PROGRESS NOTE ADULT - PROBLEM SELECTOR PLAN 1
Pt with 2 weeks of painless jaundice, associated with 25-lb weight loss, foamy dark urine, and changes to his bowel habits and stool caliber for the past 1 month. LFTs significant for t bili 29.5, direct bili >20, alk phos 532, AST 71, and ALT 38, consistent with cholestasis in setting of pancreatic mass causing extrahepatic and intrahepatic biliary ductal dilatation, including CBD dilation to 2.2 cm.    - f/u GI recs -> ERCP/EUS today 5/21   - daily LFTs  - Low threshold to start antibiotics if pt spikes fever, has worsening leukocytosis, or increasing hyperbilirubinemia due to concern for cholangitis given CBD dilatation   - continue Atarax 25 mg q8hrs PRN for itching and cholestyramine 4 g daily, urosodiol 250mg bid   - C/w famotidine 40 mg daily

## 2024-05-22 NOTE — PROGRESS NOTE ADULT - ASSESSMENT
63M PMH HTN, T2DM (on insulin), hemorrhoid, hyperthyroidism, asthma, GERD, and RAMANA (not on CPAP), surgical history of open cholecystectomy 15 years ago and appendectomy in 1975, p/w 6 weeks of progressive fatigue, painless jaundice, malodorous loose stools, worsening glycemic control, weight loss, pruritus who was found to have a pancreatic head/uncinate neck mass with upstream pancreatic and biliary ductal dilatation with elevated tumor CA-19-9 and CEA concerning for pancreatic adenocarcinoma.      PLAN:    - S/p ERCP, will f/u biopsy results  - CT C/A/P w/o radiologic evidence of mets  - Patient will likely need outpatient follow up for further surg onc workup  - Appreciate heme/onc recommendations  - Surg onc will follow      E Team Surgery  [id number]

## 2024-05-22 NOTE — CONSULT NOTE ADULT - SUBJECTIVE AND OBJECTIVE BOX
ENDOCRINE INITIAL CONSULT -     HPI:  62 yo man with history of HTN, HLD, type 2 DM (on insulin), hyperthyroidism, asthma, GERD, and RAMANA (not on CPAP) presents with 2 weeks of painless jaundice. Pt states that he started noticing yellowing of his eyes, then his skin ~2 weeks ago, associated with diffuse pruritus. Pt also has been having a 25-lb weight loss, foamy dark urine, and changes to his bowel habits and stool caliber for the past 1 month. Pt notes that whenever he eats a meal, he would develop abdominal cramping along the mid-section of his abdomen, then have a BM with frothy, foul-smelling feces. Pt denies any fever or chills but reports having intermittent nausea recently with 1 episode of NBNB vomiting last night. No additional vomiting since then. Pt had been in [place] for the last 1.5 months, arriving [place] the [place] on [place] due to his symptoms. Pt denies any chest pain, shortness of breath, palpitations, abdominal pain, melena, BRBPR, dysuria, or LE pain/swelling. Pt has significant family history of malignancy, including in his maternal grandfather, mother, and sister.    In the ED,   T 97.4-97.6, HR 75-87, -146/78-92, RR 17-24, SpO2 % RA.  T bili 29.5, direct bili >20, alk phos 532   (18 May 2024 00:12)      ENDOCRINE HISTORY     PAST MEDICAL & SURGICAL HISTORY:  Asthma      HTN (hypertension)      GERD (gastroesophageal reflux disease)      Sleep apnea      Hyperlipidemia      Type 2 diabetes mellitus      History of cholecystectomy      Hyperthyroidism      History of appendectomy          FAMILY HISTORY:  Family history of hyperthyroidism (Child)        Social History:  Denies any alcohol, tobacco, or illicit drug use.  Has 2 ex-wives and 3 children. (18 May 2024 00:12)      Home Medications:  Admelog SoloStar 100 units/mL injectable solution: 50 unit(s) injectable once a day (18 May 2024 04:33)  atenolol 50 mg oral tablet: 1 tab(s) orally once a day (18 May 2024 04:34)  famotidine 40 mg oral tablet: 1 tab(s) orally once a day (18 May 2024 04:34)  Lantus Solostar Pen 100 units/mL subcutaneous solution: 50 unit(s) subcutaneous once a day (at bedtime) (18 May 2024 04:33)  lisinopril 20 mg oral tablet: 1 tab(s) orally once a day (18 May 2024 04:34)  rosuvastatin 10 mg oral tablet: 1 tab(s) orally once a day (at bedtime) (18 May 2024 04:34)  Ventolin Nebules 2.5 mg/3 mL (0.083%) inhalation solution: 3 milliliter(s) by nebulizer once a day as needed for  shortness of breath and/or wheezing (18 May 2024 04:34)      MEDICATIONS  (STANDING):  atenolol  Tablet 50 milliGRAM(s) Oral daily  atorvastatin 40 milliGRAM(s) Oral at bedtime  cholestyramine Powder (Sugar-Free) 4 Gram(s) Oral daily  dextrose 10% Bolus 125 milliLiter(s) IV Bolus once  dextrose 5%. 1000 milliLiter(s) (100 mL/Hr) IV Continuous <Continuous>  dextrose 5%. 1000 milliLiter(s) (50 mL/Hr) IV Continuous <Continuous>  dextrose 50% Injectable 12.5 Gram(s) IV Push once  dextrose 50% Injectable 25 Gram(s) IV Push once  enoxaparin Injectable 40 milliGRAM(s) SubCutaneous every 24 hours  famotidine    Tablet 40 milliGRAM(s) Oral daily  glucagon  Injectable 1 milliGRAM(s) IntraMuscular once  insulin glargine Injectable (LANTUS) 20 Unit(s) SubCutaneous at bedtime  insulin lispro (ADMELOG) corrective regimen sliding scale   SubCutaneous at bedtime  insulin lispro (ADMELOG) corrective regimen sliding scale   SubCutaneous three times a day before meals  polyethylene glycol 3350 17 Gram(s) Oral daily  senna 2 Tablet(s) Oral at bedtime  ursodiol Tablet 250 milliGRAM(s) Oral two times a day    MEDICATIONS  (PRN):  acetaminophen     Tablet .. 650 milliGRAM(s) Oral every 6 hours PRN Temp greater or equal to 38C (100.4F), Mild Pain (1 - 3), Moderate Pain (4 - 6), Severe Pain (7 - 10)  albuterol/ipratropium for Nebulization 3 milliLiter(s) Nebulizer every 6 hours PRN Shortness of Breath and/or Wheezing  dextrose Oral Gel 15 Gram(s) Oral once PRN Blood Glucose LESS THAN 70 milliGRAM(s)/deciliter  hydrOXYzine hydrochloride 25 milliGRAM(s) Oral every 8 hours PRN Itching  petrolatum white Ointment 1 Application(s) Topical every 6 hours PRN Dry lips      Allergies    No Known Allergies    Intolerances        REVIEW OF SYSTEMS  Constitutional: No fever  Eyes: No blurry vision  Neuro: No tremors  HEENT: No pain  Cardiovascular: No chest pain, palpitations  Respiratory: No SOB, no cough  GI: No nausea, vomiting, abdominal pain  : No dysuria  Skin: no rash  Psych: no depression  Endocrine: no polyuria, polydipsia  Hem/lymph: no swelling  Osteoporosis: no fractures  ALL OTHER SYSTEMS REVIEWED AND NEGATIVE     UNABLE TO OBTAIN     PHYSICAL EXAM   Vital Signs Last 24 Hrs  T(C): 36.3 (22 May 2024 06:34), Max: 36.4 (21 May 2024 21:45)  T(F): 97.4 (22 May 2024 06:34), Max: 97.5 (21 May 2024 21:45)  HR: 77 (22 May 2024 06:34) (65 - 77)  BP: 120/60 (22 May 2024 06:34) (98/52 - 131/66)  BP(mean): --  RR: 17 (22 May 2024 06:34) (14 - 17)  SpO2: 98% (22 May 2024 06:34) (97% - 100%)    Parameters below as of 22 May 2024 06:34  Patient On (Oxygen Delivery Method): room air      GENERAL: NAD, well-groomed, well-developed  EYES: No proptosis, no lid lag, anicteric  HEENT:  Atraumatic, Normocephalic, moist mucous membranes  THYROID: Normal size, no palpable nodules  RESPIRATORY: Clear to auscultation bilaterally; No rales, rhonchi, wheezing  CARDIOVASCULAR: Regular rate and rhythm; No murmurs; no peripheral edema  GI: Soft, nontender, non distended, normal bowel sounds  SKIN: Dry, intact, No rashes or lesions  MUSCULOSKELETAL: Full range of motion, normal strength  NEURO: sensation intact, extraocular movements intact, no tremor  PSYCH: Alert and oriented x 3, normal affect, normal mood  CUSHING'S SIGNS: no striae    CAPILLARY BLOOD GLUCOSE      POCT Blood Glucose.: 330 mg/dL (22 May 2024 12:26)  POCT Blood Glucose.: 371 mg/dL (22 May 2024 08:25)  POCT Blood Glucose.: 204 mg/dL (21 May 2024 22:12)  POCT Blood Glucose.: 121 mg/dL (21 May 2024 18:54)  POCT Blood Glucose.: 111 mg/dL (21 May 2024 16:57)  POCT Blood Glucose.: 97 mg/dL (21 May 2024 14:11)        eMAR:atorvastatin   40 milliGRAM(s) Oral (05-21-24 @ 22:11)    cholestyramine Powder (Sugar-Free)   4 Gram(s) Oral (05-22-24 @ 08:53)    insulin glargine Injectable (LANTUS)   10 Unit(s) SubCutaneous (05-21-24 @ 22:29)    insulin lispro (ADMELOG) corrective regimen sliding scale   5 Unit(s) SubCutaneous (05-22-24 @ 08:52)        A1C with Estimated Average Glucose Result: 9.0 % (05-18-24 @ 06:35)                            13.2   7.47  )-----------( 242      ( 22 May 2024 05:25 )             37.4       05-22    130<L>  |  96<L>  |  20  ----------------------------<  378<H>  3.9   |  18<L>  |  1.20    Ca    8.5      22 May 2024 05:25  Phos  3.3     05-22  Mg     2.00     05-22    TPro  6.8  /  Alb  2.6<L>  /  TBili  24.0<H>  /  DBili  x   /  AST  97<H>  /  ALT  32  /  AlkPhos  509<H>  05-22      Thyroid Stimulating Hormone, Serum: 0.70 uIU/mL (05-17-24 @ 16:22)      LIPIDS    RADIOLOGY ENDOCRINE INITIAL CONSULT - DM2    HPI:  64 yo man with history of HTN, HLD, type 2 DM (on insulin), hyperthyroidism, asthma, GERD, and RAMANA (not on CPAP) presents with 2 weeks of painless jaundice. Pt states that he started noticing yellowing of his eyes, then his skin ~2 weeks ago, associated with diffuse pruritus. Pt also has been having a 25-lb weight loss, foamy dark urine, and changes to his bowel habits and stool caliber for the past 1 month. Pt notes that whenever he eats a meal, he would develop abdominal cramping along the mid-section of his abdomen, then have a BM with frothy, foul-smelling feces. Pt denies any fever or chills but reports having intermittent nausea recently with 1 episode of NBNB vomiting last night. No additional vomiting since then. Pt had been in [place] for the last 1.5 months, [place] the [place] due to his symptoms. Pt denies any chest pain, shortness of breath, palpitations, abdominal pain, melena, BRBPR, dysuria, or LE pain/swelling. Pt has significant family history of malignancy, including in his maternal grandfather, mother, and sister.    In the ED,   T 97.4-97.6, HR 75-87, -146/78-92, RR 17-24, SpO2 % RA.  T bili 29.5, direct bili >20, alk phos 532   (18 May 2024 00:12)      ENDOCRINE HISTORY   DM2 dx 5-6 years ago, started on insulin around 1 year ago - says he was previously well controlled on metformin/glyburide but over the last year has not been able to get his bg levels under good control  current regimen - Lantus 50 units and Admelog 50 units before breakfast - says he does not take Admelog before any other meal of the day per the direction of his longstanding pcp. Doses were being increased as sugars were hard to get control of but they fluctuated a lot he says.   follows with PCP  Says he had been eating but still losing weight. Has also been feeling like he needs to use the bathroom almost immediately after he starts to eat something. This was associated with gas/abdominal discomfort/cramping.   complications - no hx of CAD/CVA/Retinopathy or Neuropathy per patient. Currently w/ SOFIE but no known nephropathy either he says.     Patient also with prior hx of hyperthyroidism.   Dx in Nov 2018; patient was admitted to [facility] at that time from chart review & started on methimazole & atenolol.   Reports he is now still on Atenolol but Methimazole was discontinued by PCP about a year ago.   Says he has a good sense of when things are off in his body and that is how he felt when he was dx with hyperthyroidism and also with recent symptoms.   Does not feel like current symptoms are similar to hyperthyroid symptoms he had in the past at all. Had weight loss then too but says he felt shakey/different then.    PAST MEDICAL & SURGICAL HISTORY:  Asthma      HTN (hypertension)      GERD (gastroesophageal reflux disease)      Sleep apnea      Hyperlipidemia      Type 2 diabetes mellitus      History of cholecystectomy      Hyperthyroidism      History of appendectomy          FAMILY HISTORY:  Family history of hyperthyroidism (Child)        Social History:  Denies any alcohol, tobacco, or illicit drug use.  Has 2 ex-wives and 3 children. (18 May 2024 00:12)      Home Medications:  Admelog SoloStar 100 units/mL injectable solution: 50 unit(s) injectable once a day (18 May 2024 04:33)  atenolol 50 mg oral tablet: 1 tab(s) orally once a day (18 May 2024 04:34)  famotidine 40 mg oral tablet: 1 tab(s) orally once a day (18 May 2024 04:34)  Lantus Solostar Pen 100 units/mL subcutaneous solution: 50 unit(s) subcutaneous once a day (at bedtime) (18 May 2024 04:33)  lisinopril 20 mg oral tablet: 1 tab(s) orally once a day (18 May 2024 04:34)  rosuvastatin 10 mg oral tablet: 1 tab(s) orally once a day (at bedtime) (18 May 2024 04:34)  Ventolin Nebules 2.5 mg/3 mL (0.083%) inhalation solution: 3 milliliter(s) by nebulizer once a day as needed for  shortness of breath and/or wheezing (18 May 2024 04:34)      MEDICATIONS  (STANDING):  atenolol  Tablet 50 milliGRAM(s) Oral daily  atorvastatin 40 milliGRAM(s) Oral at bedtime  cholestyramine Powder (Sugar-Free) 4 Gram(s) Oral daily  dextrose 10% Bolus 125 milliLiter(s) IV Bolus once  dextrose 5%. 1000 milliLiter(s) (100 mL/Hr) IV Continuous <Continuous>  dextrose 5%. 1000 milliLiter(s) (50 mL/Hr) IV Continuous <Continuous>  dextrose 50% Injectable 12.5 Gram(s) IV Push once  dextrose 50% Injectable 25 Gram(s) IV Push once  enoxaparin Injectable 40 milliGRAM(s) SubCutaneous every 24 hours  famotidine    Tablet 40 milliGRAM(s) Oral daily  glucagon  Injectable 1 milliGRAM(s) IntraMuscular once  insulin glargine Injectable (LANTUS) 20 Unit(s) SubCutaneous at bedtime  insulin lispro (ADMELOG) corrective regimen sliding scale   SubCutaneous at bedtime  insulin lispro (ADMELOG) corrective regimen sliding scale   SubCutaneous three times a day before meals  polyethylene glycol 3350 17 Gram(s) Oral daily  senna 2 Tablet(s) Oral at bedtime  ursodiol Tablet 250 milliGRAM(s) Oral two times a day    MEDICATIONS  (PRN):  acetaminophen     Tablet .. 650 milliGRAM(s) Oral every 6 hours PRN Temp greater or equal to 38C (100.4F), Mild Pain (1 - 3), Moderate Pain (4 - 6), Severe Pain (7 - 10)  albuterol/ipratropium for Nebulization 3 milliLiter(s) Nebulizer every 6 hours PRN Shortness of Breath and/or Wheezing  dextrose Oral Gel 15 Gram(s) Oral once PRN Blood Glucose LESS THAN 70 milliGRAM(s)/deciliter  hydrOXYzine hydrochloride 25 milliGRAM(s) Oral every 8 hours PRN Itching  petrolatum white Ointment 1 Application(s) Topical every 6 hours PRN Dry lips      Allergies    No Known Allergies    Intolerances        REVIEW OF SYSTEMS  Constitutional: No fever  Eyes: No blurry vision  Neuro: No tremors  HEENT: No pain  Cardiovascular: No chest pain, palpitations  Respiratory: No SOB, no cough  GI: No nausea, vomiting, abdominal pain  : No dysuria  Skin: no rash  Psych: no depression  Endocrine: no polyuria, polydipsia  Hem/lymph: no swelling  Osteoporosis: no fractures  ALL OTHER SYSTEMS REVIEWED AND NEGATIVE       PHYSICAL EXAM   Vital Signs Last 24 Hrs  T(C): 36.3 (22 May 2024 06:34), Max: 36.4 (21 May 2024 21:45)  T(F): 97.4 (22 May 2024 06:34), Max: 97.5 (21 May 2024 21:45)  HR: 77 (22 May 2024 06:34) (65 - 77)  BP: 120/60 (22 May 2024 06:34) (98/52 - 131/66)  BP(mean): --  RR: 17 (22 May 2024 06:34) (14 - 17)  SpO2: 98% (22 May 2024 06:34) (97% - 100%)    Parameters below as of 22 May 2024 06:34  Patient On (Oxygen Delivery Method): room air      GENERAL: NAD, well-groomed, well-developed  EYES: No proptosis, no lid lag  HEENT:  Atraumatic, Normocephalic, moist mucous membranes  THYROID: Normal size, no palpable nodules  RESPIRATORY: Nonlabored respirations on room air, normal rate, normal effort   CARDIOVASCULAR: Regular rate and rhythm; no peripheral edema  GI: Soft, nontender, non distended  SKIN: Dry, intact, No rashes or lesions  MUSCULOSKELETAL: Full range of motion, normal strength  NEURO: sensation intact, extraocular movements intact, no tremor  PSYCH: Alert and oriented x 3, reactive affect, reactive mood   CUSHING'S SIGNS: no striae    CAPILLARY BLOOD GLUCOSE      POCT Blood Glucose.: 330 mg/dL (22 May 2024 12:26)  POCT Blood Glucose.: 371 mg/dL (22 May 2024 08:25)  POCT Blood Glucose.: 204 mg/dL (21 May 2024 22:12)  POCT Blood Glucose.: 121 mg/dL (21 May 2024 18:54)  POCT Blood Glucose.: 111 mg/dL (21 May 2024 16:57)  POCT Blood Glucose.: 97 mg/dL (21 May 2024 14:11)    eMAR:atorvastatin   40 milliGRAM(s) Oral (05-21-24 @ 22:11)    cholestyramine Powder (Sugar-Free)   4 Gram(s) Oral (05-22-24 @ 08:53)    insulin glargine Injectable (LANTUS)   10 Unit(s) SubCutaneous (05-21-24 @ 22:29)    insulin lispro (ADMELOG) corrective regimen sliding scale   8 Unit(s) SubCutaneous (05-22-24 @ 13:05)    insulin lispro (ADMELOG) corrective regimen sliding scale   5 Unit(s) SubCutaneous (05-22-24 @ 08:52)        A1C with Estimated Average Glucose Result: 9.0 % (05-18-24 @ 06:35)                            13.2   7.47  )-----------( 242      ( 22 May 2024 05:25 )             37.4       05-22    130<L>  |  96<L>  |  20  ----------------------------<  378<H>  3.9   |  18<L>  |  1.20    Ca    8.5      22 May 2024 05:25  Phos  3.3     05-22  Mg     2.00     05-22    TPro  6.8  /  Alb  2.6<L>  /  TBili  24.0<H>  /  DBili  x   /  AST  97<H>  /  ALT  32  /  AlkPhos  509<H>  05-22      Thyroid Stimulating Hormone, Serum: 0.70 uIU/mL (05-17-24 @ 16:22)      LIPIDS    RADIOLOGY

## 2024-05-22 NOTE — PROVIDER CONTACT NOTE (OTHER) - SITUATION
HR 53bpm - pre vit K
Pt c/o nausea ( No vomiting)
Pt have a closed blister with pus to R inner lower lip
bp 97/58. patient resting comfortably in bed

## 2024-05-22 NOTE — CONSULT NOTE ADULT - ASSESSMENT
62 yo man with history of HTN, HLD, type 2 DM (on insulin), hyperthyroidism, asthma, GERD, and RAMANA (not on CPAP) presents with 2 weeks of painless jaundice, found to have  a 3.5 x 4.5 x 3.0 cm heterogenous solid mass in the pancreatic head/uncinate process with extrahepatic and intrahepatic biliary dilatation. Endocrinology consulted for assistance with management of uncontrolled DM2 w/ hyperglycemia and hypoglycemia iso newly discovered pancreatic mass.   Patient is high risk with high level decision making due to uncontrolled diabetes with A1C>9, new pancreatic mass and SOFIE which places patient at high risk for cardiovascular and cerebrovascular events. Patient with lability of glucose requiring close monitoring and insulin adjustments.    #Uncontrolled DM2 w/ hypo& hyperglycemia   A1c 9%   newly found pancreatic mass   egfr 68 - SOFIE  higher basal/bolus insulin requirements at home but likely that po intake/diet is also much different   Recommendations:   - Increase to Lantus 15 units SC QHS   - Start Admelog 5 units SC Premeal/TIDAC - hold if npo or if eating < 50% of meals   - Admelog Low Correction Scale Premeal & Separate LOW Correction Scale Bedtime - change to q6h if npo   - Goal -180  - FSBlood glucose monitoring Premeal/Bedtime - change to q6h if npo   - Hypoglycemia protocol   - Carb Consistent Diet   - Nutrition consult   DC Planning: Basal/bolus insulin pens. Doses TBD closer to discharge pending insulin requirements/clinical course.  Please ensure patient has prescriptions for diabetes supplies (glucometer, test strips, lancets, alcohol swabs, insulin pen needles). Routine outpatient opthalmology & podiatry evaluations recommended. Patient can follow up with endocrinology at the location provided below:     [facility]   [address] [place] [address]  [facility] [address]  [phone]    #Hx of Hyperthyroidism  #Hx of Thyroid Nodule   - outpatient endocrine follow up  - not on Methimazole   - on atenolol   - TSH 5/17 wnl at 0.7    #HTN  Recommendations:   - outpt BP goal < 130/80   - defer to primary team   - outpatient annual urinary microalb/cr ratio  - on atenolol     #HLD  Recommendations:   - LDL goal < 70   - defer to primary team   - outpatient fasting lipid profile   - on atorvastatin 40mg qhs     Recs communicated with primary team.     Laura Uribe DO   Attending Physician   Department of Endocrinology, Diabetes and Metabolism     If before 9AM or after 5PM, or on weekends/holidays, please call the Endocrine answering service for assistance ([phone]).  For nonurgent matters, please email [email or web address] or [email or web address]     Please note that this patient may be followed by different provider tomorrow.  Notify endocrine 24 hours prior to discharge for final recommendations 62 yo man with history of HTN, HLD, type 2 DM (on insulin), hyperthyroidism, asthma, GERD, and RAMANA (not on CPAP) presents with 2 weeks of painless jaundice, found to have  a 3.5 x 4.5 x 3.0 cm heterogenous solid mass in the pancreatic head/uncinate process with extrahepatic and intrahepatic biliary dilatation. Endocrinology consulted for assistance with management of uncontrolled DM2 w/ hyperglycemia and hypoglycemia iso newly discovered pancreatic mass.   Patient is high risk with high level decision making due to uncontrolled diabetes with A1C>9, new pancreatic mass and SOFIE which places patient at high risk for cardiovascular and cerebrovascular events. Patient with lability of glucose requiring close monitoring and insulin adjustments.    #Uncontrolled DM2 w/ hypo& hyperglycemia   A1c 9%   newly found pancreatic mass   egfr 68 - SOFIE  higher basal/bolus insulin requirements at home but likely that po intake/diet is also much different   Recommendations:   - Increase to Lantus 15 units SC QHS   - Start Admelog 5 units SC Premeal/TIDAC - hold if npo or if eating < 50% of meals   - Admelog Low Correction Scale Premeal & Separate LOW Correction Scale Bedtime - change to q6h if npo   - Goal -180  - FS Blood glucose monitoring Premeal/Bedtime - change to q6h if npo   - Hypoglycemia protocol   - Carb Consistent Diet   - Nutrition consult   DC Planning: Basal/bolus insulin pens. Doses TBD closer to discharge pending insulin requirements/clinical course.  Please ensure patient has prescriptions for diabetes supplies (glucometer, test strips, lancets, alcohol swabs, insulin pen needles). Routine outpatient opthalmology & podiatry evaluations recommended. Patient can follow up with endocrinology at the location provided below:     [facility]   [address] [place] [address]  [place] [address]  [phone]    #Hx of Hyperthyroidism  #Hx of Thyroid Nodule   - outpatient endocrine follow up  - not on Methimazole   - on atenolol   - TSH 5/17 wnl at 0.7    #HTN  Recommendations:   - outpt BP goal < 130/80   - defer to primary team   - outpatient annual urinary microalb/cr ratio  - on atenolol     #HLD  Recommendations:   - LDL goal < 70   - defer to primary team   - outpatient fasting lipid profile   - on atorvastatin 40mg qhs     Recs communicated with primary team.     Laura Uribe DO   Attending Physician   Department of Endocrinology, Diabetes and Metabolism     If before 9AM or after 5PM, or on weekends/holidays, please call the Endocrine answering service for assistance ([phone]).  For nonurgent matters, please email [email or web address] or [email or web address]     Please note that this patient may be followed by different provider tomorrow.  Notify endocrine 24 hours prior to discharge for final recommendations

## 2024-05-22 NOTE — PROGRESS NOTE ADULT - SUBJECTIVE AND OBJECTIVE BOX
*******************************  Ava Helm MD (PGY-1)  Internal Medicine  Contact via [employer] TEAMS  *******************************    [facility]  63y  Male    Patient is a 63y old  Male who presents with a chief complaint of Jaundice, pancreatic mass (22 May 2024 07:07)      Subjective:    Objective:  T(C): 36.3 (05-22-24 @ 06:34), Max: 36.6 (05-21-24 @ 11:02)  HR: 77 (05-22-24 @ 06:34) (62 - 77)  BP: 120/60 (05-22-24 @ 06:34) (98/52 - 131/66)  RR: 17 (05-22-24 @ 06:34) (13 - 17)  SpO2: 98% (05-22-24 @ 06:34) (97% - 100%)  I&O's Summary    21 May 2024 07:01  -  22 May 2024 07:00  --------------------------------------------------------  IN: 0 mL / OUT: 300 mL / NET: -300 mL        PHYSICAL EXAM:  GENERAL: NAD  HEAD:  Atraumatic, Normocephalic  EYES: EOMI, PERRLA, conjunctiva and sclera clear  ENMT: Moist mucous membranes  NECK: Supple, No JVD, trachea midline   NERVOUS SYSTEM:  Alert & Oriented X3, Good concentration; Motor Strength 5/5 B/L upper and lower extremities; DTRs 2+ intact and symmetric  CHEST/LUNG: Clear to auscultation bilaterally; No rales, rhonchi, wheezing, or rubs  HEART: Regular rate and rhythm; No murmurs, rubs, or gallops  ABDOMEN: Soft, Nontender, Nondistended; Bowel sounds present  EXTREMITIES:  2+ Peripheral Pulses, No clubbing, cyanosis, or edema  LYMPH: No lymphadenopathy noted  SKIN: No rashes or lesions    MEDICATIONS  (STANDING):  atenolol  Tablet 50 milliGRAM(s) Oral daily  atorvastatin 40 milliGRAM(s) Oral at bedtime  cholestyramine Powder (Sugar-Free) 4 Gram(s) Oral daily  dextrose 10% Bolus 125 milliLiter(s) IV Bolus once  dextrose 5%. 1000 milliLiter(s) (100 mL/Hr) IV Continuous <Continuous>  dextrose 5%. 1000 milliLiter(s) (50 mL/Hr) IV Continuous <Continuous>  dextrose 50% Injectable 12.5 Gram(s) IV Push once  dextrose 50% Injectable 25 Gram(s) IV Push once  famotidine    Tablet 40 milliGRAM(s) Oral daily  glucagon  Injectable 1 milliGRAM(s) IntraMuscular once  insulin glargine Injectable (LANTUS) 10 Unit(s) SubCutaneous at bedtime  insulin lispro (ADMELOG) corrective regimen sliding scale   SubCutaneous three times a day before meals  insulin lispro (ADMELOG) corrective regimen sliding scale   SubCutaneous at bedtime  polyethylene glycol 3350 17 Gram(s) Oral daily  senna 2 Tablet(s) Oral at bedtime  ursodiol Tablet 250 milliGRAM(s) Oral two times a day    MEDICATIONS  (PRN):  acetaminophen     Tablet .. 650 milliGRAM(s) Oral every 6 hours PRN Temp greater or equal to 38C (100.4F), Mild Pain (1 - 3), Moderate Pain (4 - 6), Severe Pain (7 - 10)  albuterol/ipratropium for Nebulization 3 milliLiter(s) Nebulizer every 6 hours PRN Shortness of Breath and/or Wheezing  dextrose Oral Gel 15 Gram(s) Oral once PRN Blood Glucose LESS THAN 70 milliGRAM(s)/deciliter  hydrOXYzine hydrochloride 25 milliGRAM(s) Oral every 8 hours PRN Itching  petrolatum white Ointment 1 Application(s) Topical every 6 hours PRN Dry lips      LABS:        CAPILLARY BLOOD GLUCOSE      POCT Blood Glucose.: 204 mg/dL (21 May 2024 22:12)  POCT Blood Glucose.: 121 mg/dL (21 May 2024 18:54)  POCT Blood Glucose.: 111 mg/dL (21 May 2024 16:57)  POCT Blood Glucose.: 97 mg/dL (21 May 2024 14:11)  POCT Blood Glucose.: 116 mg/dL (21 May 2024 12:04)  POCT Blood Glucose.: 63 mg/dL (21 May 2024 11:27)  POCT Blood Glucose.: 64 mg/dL (21 May 2024 11:26)      RADIOLOGY & ADDITIONAL TESTS:

## 2024-05-22 NOTE — PROVIDER CONTACT NOTE (OTHER) - BACKGROUND
admitted with pancreatic mass
Pt was admitted with pancreatic disorder
Pt was admitted with pancreatic disorder
patient admitted with obstructive pancreatic jaundice. pending endo - needs vitamin K infusion and repeat INR labs before procedure

## 2024-05-22 NOTE — DISCHARGE NOTE PROVIDER - NSDCCPCAREPLAN_GEN_ALL_CORE_FT
PRINCIPAL DISCHARGE DIAGNOSIS  Diagnosis: Pancreatic mass  Assessment and Plan of Treatment:       SECONDARY DISCHARGE DIAGNOSES  Diagnosis: Uncontrolled type 2 diabetes mellitus with hyperglycemia  Assessment and Plan of Treatment: Given your reduce oral intake, your insulin requirements have signficantly reduced from prior. Please REDUCE your Insulin Glargine dose to 5units at bedtime.  If your finger stick is consistently >180 then you will likely need a premeal added back into your reigmen. Please monitor your finger stick at home and if is consistently >180, please contact your PCP to adjust your regimen.  A Freestyle hung sensor was sent to your pharmacy.  Please [facility] with [facility] within 1 month of discharge.  [address] [place] [address]  [phone]      Diagnosis: Hyperthyroidism  Assessment and Plan of Treatment: Please DISCONTINUE your Atenolol. Do not continue taking this at home. Follow up with Endocrinology.     PRINCIPAL DISCHARGE DIAGNOSIS  Diagnosis: Pancreatic mass  Assessment and Plan of Treatment:       SECONDARY DISCHARGE DIAGNOSES  Diagnosis: Uncontrolled type 2 diabetes mellitus with hyperglycemia  Assessment and Plan of Treatment: Given your reduce oral intake, your insulin requirements have signficantly reduced from prior. Please REDUCE your Insulin Glargine dose to 5units at bedtime.  If your finger stick is consistently >180 then you will likely need a premeal added back into your reigmen. Please monitor your finger stick at home and if is consistently >180, please contact your PCP to adjust your regimen.  A Freestyle hung sensor was sent to your pharmacy.  Please follow up with [facility] within 1 month of discharge.  [address] [place] [address]  [phone]      Diagnosis: Hyperthyroidism  Assessment and Plan of Treatment: Please DISCONTINUE your Atenolol. Do not continue taking this at home. Follow up with Endocrinology/PCP for repeat thyroid function tests and heart rate monitoring.

## 2024-05-22 NOTE — DISCHARGE NOTE PROVIDER - CARE PROVIDER_API CALL
Jose Angel Guevara-Yeou  Surgery  [address] [place] [id number]  Phone: [phone]  Fax: [phone]  Follow Up Time: 1 week

## 2024-05-22 NOTE — DISCHARGE NOTE PROVIDER - NSDCFUSCHEDAPPT_GEN_ALL_CORE_FT
Jose Angel Guevara  [facility] Physician [facility]  MULTIDISC [address]  Scheduled Appointment: 06/06/2024    Ramos Larios  [facility] [address]  Scheduled Appointment: 06/06/2024

## 2024-05-22 NOTE — DISCHARGE NOTE PROVIDER - NSDCMRMEDTOKEN_GEN_ALL_CORE_FT
Admelog SoloStar 100 units/mL injectable solution: 50 unit(s) injectable once a day  atenolol 50 mg oral tablet: 1 tab(s) orally once a day  famotidine 40 mg oral tablet: 1 tab(s) orally once a day  Lantus Solostar Pen 100 units/mL subcutaneous solution: 50 unit(s) subcutaneous once a day (at bedtime)  lisinopril 20 mg oral tablet: 1 tab(s) orally once a day  rosuvastatin 10 mg oral tablet: 1 tab(s) orally once a day (at bedtime)  Ventolin Nebules 2.5 mg/3 mL (0.083%) inhalation solution: 3 milliliter(s) by nebulizer once a day as needed for  shortness of breath and/or wheezing   Admelog SoloStar 100 units/mL injectable solution: 50 unit(s) injectable once a day  atenolol 50 mg oral tablet: 1 tab(s) orally once a day  famotidine 40 mg oral tablet: 1 tab(s) orally once a day  Freestyle Cherrie 3 Sensors: Please change every 14 days  Lanihsanus Solostar Pen 100 units/mL subcutaneous solution: 50 unit(s) subcutaneous once a day (at bedtime)  lisinopril 20 mg oral tablet: 1 tab(s) orally once a day  rosuvastatin 10 mg oral tablet: 1 tab(s) orally once a day (at bedtime)  Ventolin Nebules 2.5 mg/3 mL (0.083%) inhalation solution: 3 milliliter(s) by nebulizer once a day as needed for  shortness of breath and/or wheezing   acetaminophen 325 mg oral tablet: 2 tab(s) orally every 6 hours As needed Temp greater or equal to 38C (100.4F), Mild Pain (1 - 3), Moderate Pain (4 - 6), Severe Pain (7 - 10)  cholestyramine 4 g/9 g oral powder for reconstitution: 4 gram(s) orally once a day x 30 days  famotidine 40 mg oral tablet: 1 tab(s) orally once a day  Freestyle Cherrie 3 Sensors: Please change every 14 days  hydrOXYzine hydrochloride 25 mg oral tablet: 1 tab(s) orally every 8 hours x 14 days as needed for Itching  insulin glargine 100 units/mL subcutaneous solution: 5 unit(s) subcutaneous once a day (at bedtime)  polyethylene glycol 3350 oral powder for reconstitution: 17 gram(s) orally once a day  rosuvastatin 10 mg oral tablet: 1 tab(s) orally once a day (at bedtime)  senna leaf extract oral tablet: 2 tab(s) orally once a day (at bedtime)  ursodiol 250 mg oral tablet: 1 tab(s) orally 2 times a day x 30 days  Ventolin Nebules 2.5 mg/3 mL (0.083%) inhalation solution: 3 milliliter(s) by nebulizer once a day as needed for  shortness of breath and/or wheezing   acetaminophen 325 mg oral tablet: 2 tab(s) orally every 6 hours As needed Temp greater or equal to 38C (100.4F), Mild Pain (1 - 3), Moderate Pain (4 - 6), Severe Pain (7 - 10)  alcohol swabs: Apply topically to affected area 4 times a day  cholestyramine 4 g/9 g oral powder for reconstitution: 4 gram(s) orally once a day x 30 days  famotidine 40 mg oral tablet: 1 tab(s) orally once a day  Freestyle Cherrie 3 Sensors: Please change every 14 days  hydrOXYzine hydrochloride 25 mg oral tablet: 1 tab(s) orally every 8 hours x 14 days as needed for Itching  Insulin Pen Needles, 4mm: 1 application subcutaneously 4 times a day. ** Use with insulin pen **  lancets: 1 application subcutaneously 4 times a day  Lantus Solostar Pen 100 units/mL subcutaneous solution: 5 unit(s) subcutaneous once a day (at bedtime) x 30 days  polyethylene glycol 3350 oral powder for reconstitution: 17 gram(s) orally once a day  rosuvastatin 10 mg oral tablet: 1 tab(s) orally once a day (at bedtime)  senna leaf extract oral tablet: 2 tab(s) orally once a day (at bedtime)  test strips (per patient&#x27;s insurance): 1 application subcutaneously 4 times a day. ** Compatible with patient&#x27;s glucometer **  ursodiol 250 mg oral tablet: 1 tab(s) orally 2 times a day x 30 days  Ventolin Nebules 2.5 mg/3 mL (0.083%) inhalation solution: 3 milliliter(s) by nebulizer once a day as needed for  shortness of breath and/or wheezing

## 2024-05-22 NOTE — PROGRESS NOTE ADULT - PROBLEM SELECTOR PLAN 4
WBC 10.83 on admission. Pt afebrile, did not meet SIRS criteria. Possibly from malignancy.     RESOLVED     - daily cbc

## 2024-05-22 NOTE — PROGRESS NOTE ADULT - ASSESSMENT
63M with history of HTN, HLD, type 2 DM (on insulin), hyperthyroidism, asthma, GERD, and RAMANA (not on CPAP) presents with 1 week of painless jaundice    Impression  #obstructive jaundice 2/2 pancreatic head/uncinate mass c/b biliary stricture  #weight loss; 25lbs over past 1 month  - afebrile; no leukocytosis; abd exam benign without tenderness of distension  - CT showing 3.5 x 4.5 x 3.0 cm heterogenous solid mass in the pancreatic head/uncinate process with upstream main PD dilatation. Mass abuts the SMV and second portion of the duodenum. Mass also contacts the proximal SMA; extrahepatic/intrahepatic biliary dilation with CBD 2.2cm with abrupt narrowing to level of the pancreatic mass  - s/p ERCP 5/21/24: noted to have biliary stricture s /p sphincterotomy, brushing, and 10 mm x 60 mm covered metal stent in the CBD; irregular z-line biopsied; erythematous gastric mucosa biopsied.    Recommendations  - No additional intervention from [facility] indicated at this time  - Follow-up path and cytology  - Plan to treat outpatient, if positive for H. pylori  - Diet as tolerated  - Daily CBC, CMP, coags  - Ensure adequate hydration  - Follow up with [facility] outpatient in 2-3 mos  - Please provide patient with Gastroenterology Clinic  for outpatient follow up with Dr. Maddie Goodwin; [phone] ([facility] at [address] [place] [address])    - Rest of care per primary     Preliminary note until signed by Attending.    Thank you for involving us in this patient's care. Please reach out if any further questions or concerns. Signing off.    Anastasia Lu MD  Gastroenterology/Hepatology Fellow, PGY-7    [facility] ROUTINE CONSULT 24/7: [email or web address] ROUTINE CONSULT 24/7: [email or web address]  For urgent consults during the weekends (all day) and weeknights (5PM to 7 AM) please:  1. Contact on call GI team via page followed by TEAMS Call if no response  2. If no response, call the answering service ([phone])

## 2024-05-22 NOTE — PROGRESS NOTE ADULT - PROBLEM SELECTOR PLAN 7
Pt states that he now takes Lantus 50 u qHS and Admelog 50 u qAM before breakfast at home.   A1c 9%. Episodes of hypoglycemia while NPO    - C/w Lantus 10 mg qhs   - low dose insulin sliding scale premeal and bedtime  - hypoglycemia protocol

## 2024-05-22 NOTE — PROVIDER CONTACT NOTE (OTHER) - ASSESSMENT
bp 97/58. patient resting comfortably in bed
Pt c/o nausea ( No vomiting)
Pt have a closed blister with pus to R inner lower lip
patient resting comfortably in bed.     patient admitted with obstructive pancreatic jaundice. pending endo - needs vitamin K infusion and repeat INR labs before procedure

## 2024-05-22 NOTE — PROGRESS NOTE ADULT - SUBJECTIVE AND OBJECTIVE BOX
Chief Complaint:  Patient is a 63y old  Male who presents with a chief complaint of Jaundice, pancreatic mass (22 May 2024 07:26)       Interval Events:   Afebrile and stable  Denying n /v/abdominal pain  Tolerating liquid diet      Hospital Medications:  acetaminophen     Tablet .. 650 milliGRAM(s) Oral every 6 hours PRN  albuterol/ipratropium for Nebulization 3 milliLiter(s) Nebulizer every 6 hours PRN  atenolol  Tablet 50 milliGRAM(s) Oral daily  atorvastatin 40 milliGRAM(s) Oral at bedtime  cholestyramine Powder (Sugar-Free) 4 Gram(s) Oral daily  dextrose 10% Bolus 125 milliLiter(s) IV Bolus once  dextrose 5%. 1000 milliLiter(s) IV Continuous <Continuous>  dextrose 5%. 1000 milliLiter(s) IV Continuous <Continuous>  dextrose 50% Injectable 12.5 Gram(s) IV Push once  dextrose 50% Injectable 25 Gram(s) IV Push once  dextrose Oral Gel 15 Gram(s) Oral once PRN  famotidine    Tablet 40 milliGRAM(s) Oral daily  glucagon  Injectable 1 milliGRAM(s) IntraMuscular once  hydrOXYzine hydrochloride 25 milliGRAM(s) Oral every 8 hours PRN  insulin glargine Injectable (LANTUS) 10 Unit(s) SubCutaneous at bedtime  insulin lispro (ADMELOG) corrective regimen sliding scale   SubCutaneous three times a day before meals  insulin lispro (ADMELOG) corrective regimen sliding scale   SubCutaneous at bedtime  petrolatum white Ointment 1 Application(s) Topical every 6 hours PRN  polyethylene glycol 3350 17 Gram(s) Oral daily  senna 2 Tablet(s) Oral at bedtime  ursodiol Tablet 250 milliGRAM(s) Oral two times a day      ROS:   Complete and normal except as mentioned above.    PHYSICAL EXAM:   Vital Signs:  Vital Signs Last 24 Hrs  T(C): 36.3 (22 May 2024 06:34), Max: 36.4 (21 May 2024 21:45)  T(F): 97.4 (22 May 2024 06:34), Max: 97.5 (21 May 2024 21:45)  HR: 77 (22 May 2024 06:34) (65 - 77)  BP: 120/60 (22 May 2024 06:34) (98/52 - 131/66)  BP(mean): --  RR: 17 (22 May 2024 06:34) (13 - 17)  SpO2: 98% (22 May 2024 06:34) (97% - 100%)    Parameters below as of 22 May 2024 06:34  Patient On (Oxygen Delivery Method): room air      Daily     Daily Weight in k.3 (22 May 2024 06:34)    GENERAL: no acute distress  NEURO: aox3  HEENT: scleral icterus  CHEST: no respiratory distress, no accessory muscle use  CARDIAC: regular rate,    ABDOMEN: soft, non-tender, non-distended, no rebound or guarding  EXTREMITIES: warm, well perfused, no edema  SKIN: no lesions noted    LABS:                          13.2   7.47  )-----------( 242      ( 22 May 2024 05:25 )             37.4     05-22    130<L>  |  96<L>  |  20  ----------------------------<  378<H>  3.9   |  18<L>  |  1.20    Ca    8.5      22 May 2024 05:25  Phos  3.3     05-22  Mg     2.00     -22    TPro  6.8  /  Alb  2.6<L>  /  TBili  24.0<H>  /  DBili  x   /  AST  97<H>  /  ALT  32  /  AlkPhos  509<H>  22    LIVER FUNCTIONS - ( 22 May 2024 05:25 )  Alb: 2.6 g/dL / Pro: 6.8 g/dL / ALK PHOS: 509 U/L / ALT: 32 U/L / AST: 97 U/L / GGT: x             Interval Diagnostic Studies:  < from: ERCP (24 @ 14:28) >  Findings:       A standard esophagogastroduodenoscopy scope was used for the examination        of the upper gastrointestinal tract. The scope was passed under direct        vision through the upper GI tract. The Z-line was irregular. Diffuse        moderately erythematous mucosa without bleeding was found in the        stomach. Diffuse mildly erythematous mucosa without active bleeding and        with no stigmata of bleeding was found in the first portion of the        duodenum. Biopsies were performed at the gastroesophageal junction, in        the gastric body and in the gastric antrum through the        esophagogastroduodenoscope with a cold forceps for histology.       A  film of the abdomen was obtained. Surgical clips were seen in        the area of the right upper quadrant of the abdomen. The esophagus was        successfully intubated under direct vision. The scope was advanced from        the mouth to the duodenum. The major papilla was bulging. Deep        cannulation of the ventral pancreatic duct was accomplished with the        short-nosed traction sphincterotome. A short 0.025 inch Jagwire was        passed into the biliary tree. The short-nosed traction sphincterotome        was passed over the guidewire and the bile duct was then deeply        cannulated. Contrast was injected. I personally interpreted the bile        duct images. Ductal flow of contrast was adequate. Image quality was        adequate. Contrast extended to the hepatic ducts. The lower third of the        main bile duct contained a single severe stenosis. Biliary        sphincterotomy was made with a monofilament traction (standard)        sphincterotome using ERBE electrocautery. There was no        post-sphincterotomy bleeding. To discover objects, the biliary tree was        swept with an 8.5 mm balloon starting at the middle third of the main        bile duct. Nothing was found. Cells for cytology were obtained by        brushing in the lower third of the main bile duct. One 10 mm x 60 mm        WallFlex Covered metal stent was placed into the common bile duct. Bile        flowed through the stent. The stent was advanced distally with a raptor        forcep into a good position.       A linear echoendoscope was used for examination. An irregular mass was        identified in the uncinate process of the pancreas. The mass was        hypoechoic and heterogenous. The mass measured 38 mm in maximal        cross-sectional diameter. The endosonographic borders were        poorly-defined. Fine needle biopsy was performed. Color Doppler imaging        was utilized prior to needle puncture to confirm a lack of significant        vascular structures within the needle path. Three passes were made with        the 22 gauge Clarity biopsy needle using a transduodenal approach. A        visible core of tissue was obtained. Final pathology results are pending.                                                                                   Impression:          - Biliary stricture s/p sphincterotomy, brushing, and                        stent placement (10 mm x 60 mm [id number]).                      - Endosonographic finding notable for a mass in the                        uncinate process of the pancreas s/p FNB x3.                       - Z-line irregular s/p biopsy.                       - Erythematous mucosa in the stomach s/p biopsy.                                                                                        ___________________  WINNIE REYES MD  2024 5:06:49 PM  Number of Addenda: 0    Note Initiated On: 2024 2:28 PM    < end of copied text >

## 2024-05-22 NOTE — PROGRESS NOTE ADULT - PROBLEM SELECTOR PLAN 3
Pt with serum HCO3 19 with AG 18 on admission. On VBG, pH 7.35 and lactate 3.1. Possibly from type B lactic acidosis in setting of pancreatic malignancy. Unlikely to be DKA, as BHB negative and UA negative for ketones.     RESOLVED

## 2024-05-22 NOTE — DISCHARGE NOTE PROVIDER - NSDCQMPCI_CARD_ALL_CORE
Apply ice on your neck for 15 to 20 minutes every hour or as directed. Use an ice pack, or put crushed ice in a plastic bag. Cover it with a towel. Ice helps prevent tissue damage and decreases swelling and pain. Apply heat on your neck for 20 to 30 minutes every 2 hours  Heat helps decrease pain and muscle spasms. Rest as needed.  Return to your daily activities as tolerated
No

## 2024-05-22 NOTE — DISCHARGE NOTE PROVIDER - HOSPITAL COURSE
HPI:  64 yo man with history of HTN, HLD, type 2 DM (on insulin), hyperthyroidism, asthma, GERD, and RAMANA (not on CPAP) presents with 2 weeks of painless jaundice. Pt states that he started noticing yellowing of his eyes, then his skin ~2 weeks ago, associated with diffuse pruritus. Pt also has been having a 25-lb weight loss, foamy dark urine, and changes to his bowel habits and stool caliber for the past 1 month. Pt notes that whenever he eats a meal, he would develop abdominal cramping along the mid-section of his abdomen, then have a BM with frothy, foul-smelling feces. Pt denies any fever or chills but reports having intermittent nausea recently with 1 episode of NBNB vomiting last night. No additional vomiting since then. Pt had been in [place] for the last 1.5 months, [place] the [place] due to his symptoms. Pt denies any chest pain, shortness of breath, palpitations, abdominal pain, melena, BRBPR, dysuria, or LE pain/swelling. Pt has significant family history of malignancy, including in his maternal grandfather, mother, and sister.    In the ED,   T 97.4-97.6, HR 75-87, -146/78-92, RR 17-24, SpO2 % RA.  T bili 29.5, direct bili >20, alk phos 532   (18 May 2024 00:12)    Hospital Course:      On day of discharge, patient is clinically stable with no new exam findings or acute symptoms compared to prior. The patient was seen by the attending physician on the date of discharge and deemed stable and acceptable for discharge. The patient's chronic medical conditions were treated accordingly per the patient's home medication regimen. The patient's medication reconciliation, follow up appointments, discharge instructions, and significant lab and diagnostic studies are as noted.   Important Medication Changes and Reason:    Active or Pending Issues Requiring Follow-up:    Advanced Directives:   [ ] Full code  [ ] DNR  [ ] Hospice    Discharge Diagnoses:         HPI:  64 yo man with history of HTN, HLD, type 2 DM (on insulin), hyperthyroidism, asthma, GERD, and RAMANA (not on CPAP) presents with 2 weeks of painless jaundice. Pt states that he started noticing yellowing of his eyes, then his skin ~2 weeks ago, associated with diffuse pruritus. Pt also has been having a 25-lb weight loss, foamy dark urine, and changes to his bowel habits and stool caliber for the past 1 month. Pt notes that whenever he eats a meal, he would develop abdominal cramping along the mid-section of his abdomen, then have a BM with frothy, foul-smelling feces. Pt denies any fever or chills but reports having intermittent nausea recently with 1 episode of NBNB vomiting last night. No additional vomiting since then. Pt had been in [place] for the last 1.5 months, [place] due to his symptoms. Pt denies any chest pain, shortness of breath, palpitations, abdominal pain, melena, BRBPR, dysuria, or LE pain/swelling. Pt has significant family history of malignancy, including in his maternal grandfather, mother, and sister.      Hospital Course:      On day of discharge, patient is clinically stable with no new exam findings or acute symptoms compared to prior. The patient was seen by the attending physician on the date of discharge and deemed stable and acceptable for discharge. The patient's chronic medical conditions were treated accordingly per the patient's home medication regimen. The patient's medication reconciliation, follow up appointments, discharge instructions, and significant lab and diagnostic studies are as noted.   Important Medication Changes and Reason:    Active or Pending Issues Requiring Follow-up:    Advanced Directives:   [ ] Full code  [ ] DNR  [ ] Hospice    Discharge Diagnoses:         HPI:  62 yo man with history of HTN, HLD, type 2 DM (on insulin), hyperthyroidism, asthma, GERD, and RAMANA (not on CPAP) presents with 2 weeks of painless jaundice. Pt states that he started noticing yellowing of his eyes, then his skin ~2 weeks ago, associated with diffuse pruritus. Pt also has been having a 25-lb weight loss, foamy dark urine, and changes to his bowel habits and stool caliber for the past 1 month. Pt notes that whenever he eats a meal, he would develop abdominal cramping along the mid-section of his abdomen, then have a BM with frothy, foul-smelling feces. Pt denies any fever or chills but reports having intermittent nausea recently with 1 episode of NBNB vomiting last night. No additional vomiting since then. Pt had been in [place] for the last 1.5 months, [place] the [place] due to his symptoms. Pt denies any chest pain, shortness of breath, palpitations, abdominal pain, melena, BRBPR, dysuria, or LE pain/swelling. Pt has significant family history of malignancy, including in his maternal grandfather, mother, and sister.      Hospital Course:  5/18: new pancreatic mass seen on imaging. also w/ obstructive jaundice. plan for EGD/EUS on monday 5/19: ERCP+EUS tomorrow   5/20: hypoglycemic overnight to 50s, half amp D50, now on D5, lip blister (Xanthoma?), surg onc consult for tumor resection   5/21: ERCP/EUS today for stent placement and mass biopsy.   5/22: resume on regular diet, endo consulted for guidance on sugars.   5/23: worsening SOFIE, Tbili and LFTs uptrending. increased basal/bolus per endo recs   5/24: CT A/P to assess for biliary stent,   5/25: transfer to surg service  5/27: MRCP performed IMPRESSION: Pancreatic head neoplasm., Severe intrahepatic and extrahepatic biliary ductal dilatation. Common bile duct tapers in the region of the pancreatic mass and contains layering debris. Distal common bile duct enhancement, possibly postprocedural.   5/28: GI reviewed MRCP, concern for stent migration. No stent seen on MRCP. Planning for ERCP.  5/29 s/p ERCP with GI: Debris was swept from the bile duct with a balloon. Biliary stricture s/p stent placement (10 mm x 60 mm uncovered metal stent).  5/30 Diet advanced as tolerated  5/31 Tbili noted to be downtrending. Patient tolerating diet. Endocrinology contact for final recommendations regarding adjusted Insulin requirements and Atenolol for Hyperthyroidism    At the time of discharge, the patient was hemodynamically stable, was tolerating PO diet, was voiding urine and passing stool, was ambulating, and was comfortable with adequate pain control. The patient was instructed to follow up with Dr. Guevara within 1-2 weeks after discharge from the hospital. The patient/family felt comfortable with discharge. The patient was discharged home with a prescription for ***. The patient had no other issues.              HPI:  64 yo man with history of HTN, HLD, type 2 DM (on insulin), hyperthyroidism, asthma, GERD, and RAMANA (not on CPAP) presents with 2 weeks of painless jaundice. Pt states that he started noticing yellowing of his eyes, then his skin ~2 weeks ago, associated with diffuse pruritus. Pt also has been having a 25-lb weight loss, foamy dark urine, and changes to his bowel habits and stool caliber for the past 1 month. Pt notes that whenever he eats a meal, he would develop abdominal cramping along the mid-section of his abdomen, then have a BM with frothy, foul-smelling feces. Pt denies any fever or chills but reports having intermittent nausea recently with 1 episode of NBNB vomiting last night. No additional vomiting since then. Pt had been in [place] for the last 1.5 months, [place] due to his symptoms. Pt denies any chest pain, shortness of breath, palpitations, abdominal pain, melena, BRBPR, dysuria, or LE pain/swelling. Pt has significant family history of malignancy, including in his maternal grandfather, mother, and sister.      Hospital Course:  5/18: new pancreatic mass seen on imaging. also w/ obstructive jaundice. plan for EGD/EUS on monday 5/19: ERCP+EUS tomorrow   5/20: hypoglycemic overnight to 50s, half amp D50, now on D5, lip blister (Xanthoma?), surg onc consult for tumor resection   5/21: ERCP/EUS today for stent placement and mass biopsy.   5/22: resume on regular diet, endo consulted for guidance on sugars.   5/23: worsening SOFIE, Tbili and LFTs uptrending. increased basal/bolus per endo recs   5/24: CT A/P to assess for biliary stent,   5/25: transfer to surg service  5/27: MRCP performed IMPRESSION: Pancreatic head neoplasm., Severe intrahepatic and extrahepatic biliary ductal dilatation. Common bile duct tapers in the region of the pancreatic mass and contains layering debris. Distal common bile duct enhancement, possibly postprocedural.   5/28: GI reviewed MRCP, concern for stent migration. No stent seen on MRCP. Planning for ERCP.  5/29 s/p ERCP with GI: Debris was swept from the bile duct with a balloon. Biliary stricture s/p stent placement (10 mm x 60 mm uncovered metal stent).  5/30 Diet advanced as tolerated  5/31 Tbili noted to be downtrending. Patient tolerating diet. Endocrinology contact for final recommendations regarding adjusted Insulin requirements (to be reduced from home dosing) and Atenolol (to be held) for Hyperthyroidism    At the time of discharge, the patient was hemodynamically stable, was tolerating PO diet, was voiding urine and passing stool, was ambulating, and was comfortable with adequate pain control. The patient was instructed to follow up with Dr. Guevara within 1-2 weeks after discharge from the hospital. The patient/family felt comfortable with discharge. . The patient had no other issues.

## 2024-05-22 NOTE — PROGRESS NOTE ADULT - SUBJECTIVE AND OBJECTIVE BOX
Surgery Progress Note    S: Patient seen and examined. No acute events overnight. S/p ERCP (stent placement, sphincterotomy, brushing for biliary stricture; uncinate mass FNB x3; irregular Z-line s/p biopsy; erythematous mucosa stomach s/p biopsy). Reports having had some soreness after ERCP but now resolved. Drank fluids after, tolerated, no n/v. Feeling well this AM.      O:  Physical Exam:  Gen: Laying in bed, NAD  HEENT: atraumatic  Resp: Unlabored breathing, RA  Abd: soft, nontender, nondistended  Ext: Moves 4 extremities spontaneously    Vital Signs Last 24 Hrs  T(C): 36.3 (22 May 2024 06:34), Max: 36.6 (21 May 2024 11:02)  T(F): 97.4 (22 May 2024 06:34), Max: 97.9 (21 May 2024 11:02)  HR: 77 (22 May 2024 06:34) (62 - 77)  BP: 120/60 (22 May 2024 06:34) (98/52 - 131/66)  BP(mean): --  RR: 17 (22 May 2024 06:34) (13 - 17)  SpO2: 98% (22 May 2024 06:34) (97% - 100%)    Parameters below as of 22 May 2024 06:34  Patient On (Oxygen Delivery Method): room air        I&O's Detail    21 May 2024 07:01  -  22 May 2024 07:00  --------------------------------------------------------  IN:  Total IN: 0 mL    OUT:    Voided (mL): 300 mL  Total OUT: 300 mL    Total NET: -300 mL                                13.2   7.47  )-----------( 242      ( 22 May 2024 05:25 )             37.4       05-21    133<L>  |  100  |  15  ----------------------------<  133<H>  3.6   |  22  |  1.01    Ca    8.4      21 May 2024 04:20  Phos  2.8     05-21  Mg     1.90     05-21    TPro  6.3  /  Alb  2.2<L>  /  TBili  23.4<H>  /  DBili  x   /  AST  63<H>  /  ALT  29  /  AlkPhos  444<H>  05-21

## 2024-05-23 DIAGNOSIS — N17.9 ACUTE KIDNEY FAILURE, UNSPECIFIED: ICD-10-CM

## 2024-05-23 LAB
ALBUMIN SERPL ELPH-MCNC: 2.6 G/DL — LOW (ref 3.3–5)
ALBUMIN SERPL ELPH-MCNC: 2.8 G/DL — LOW (ref 3.3–5)
ALP SERPL-CCNC: 482 U/L — HIGH (ref 40–120)
ALP SERPL-CCNC: 516 U/L — HIGH (ref 40–120)
ALT FLD-CCNC: 41 U/L — SIGNIFICANT CHANGE UP (ref 4–41)
ALT FLD-CCNC: 44 U/L — HIGH (ref 4–41)
ANION GAP SERPL CALC-SCNC: 15 MMOL/L — HIGH (ref 7–14)
ANION GAP SERPL CALC-SCNC: 18 MMOL/L — HIGH (ref 7–14)
APTT BLD: 45.2 SEC — HIGH (ref 24.5–35.6)
AST SERPL-CCNC: 121 U/L — HIGH (ref 4–40)
AST SERPL-CCNC: 131 U/L — HIGH (ref 4–40)
BASOPHILS # BLD AUTO: 0 K/UL — SIGNIFICANT CHANGE UP (ref 0–0.2)
BASOPHILS NFR BLD AUTO: 0 % — SIGNIFICANT CHANGE UP (ref 0–2)
BILIRUB SERPL-MCNC: 24.5 MG/DL — HIGH (ref 0.2–1.2)
BILIRUB SERPL-MCNC: 26.8 MG/DL — HIGH (ref 0.2–1.2)
BUN SERPL-MCNC: 54 MG/DL — HIGH (ref 7–23)
BUN SERPL-MCNC: 61 MG/DL — HIGH (ref 7–23)
CALCIUM SERPL-MCNC: 8.1 MG/DL — LOW (ref 8.4–10.5)
CALCIUM SERPL-MCNC: 8.4 MG/DL — SIGNIFICANT CHANGE UP (ref 8.4–10.5)
CHLORIDE SERPL-SCNC: 91 MMOL/L — LOW (ref 98–107)
CHLORIDE SERPL-SCNC: 95 MMOL/L — LOW (ref 98–107)
CO2 SERPL-SCNC: 16 MMOL/L — LOW (ref 22–31)
CO2 SERPL-SCNC: 16 MMOL/L — LOW (ref 22–31)
CREAT ?TM UR-MCNC: 89 MG/DL — SIGNIFICANT CHANGE UP
CREAT SERPL-MCNC: 2.82 MG/DL — HIGH (ref 0.5–1.3)
CREAT SERPL-MCNC: 2.91 MG/DL — HIGH (ref 0.5–1.3)
EGFR: 23 ML/MIN/1.73M2 — LOW
EGFR: 24 ML/MIN/1.73M2 — LOW
EOSINOPHIL # BLD AUTO: 0.01 K/UL — SIGNIFICANT CHANGE UP (ref 0–0.5)
EOSINOPHIL NFR BLD AUTO: 0.1 % — SIGNIFICANT CHANGE UP (ref 0–6)
GLUCOSE BLDC GLUCOMTR-MCNC: 189 MG/DL — HIGH (ref 70–99)
GLUCOSE BLDC GLUCOMTR-MCNC: 212 MG/DL — HIGH (ref 70–99)
GLUCOSE BLDC GLUCOMTR-MCNC: 265 MG/DL — HIGH (ref 70–99)
GLUCOSE BLDC GLUCOMTR-MCNC: 88 MG/DL — SIGNIFICANT CHANGE UP (ref 70–99)
GLUCOSE SERPL-MCNC: 215 MG/DL — HIGH (ref 70–99)
GLUCOSE SERPL-MCNC: 277 MG/DL — HIGH (ref 70–99)
HCT VFR BLD CALC: 35.5 % — LOW (ref 39–50)
HCT VFR BLD CALC: 36.9 % — LOW (ref 39–50)
HGB BLD-MCNC: 12.7 G/DL — LOW (ref 13–17)
HGB BLD-MCNC: 13.2 G/DL — SIGNIFICANT CHANGE UP (ref 13–17)
IANC: 11.96 K/UL — HIGH (ref 1.8–7.4)
IMM GRANULOCYTES NFR BLD AUTO: 0.4 % — SIGNIFICANT CHANGE UP (ref 0–0.9)
INR BLD: 1.56 RATIO — HIGH (ref 0.85–1.18)
LYMPHOCYTES # BLD AUTO: 0.87 K/UL — LOW (ref 1–3.3)
LYMPHOCYTES # BLD AUTO: 6.4 % — LOW (ref 13–44)
MAGNESIUM SERPL-MCNC: 2.2 MG/DL — SIGNIFICANT CHANGE UP (ref 1.6–2.6)
MCHC RBC-ENTMCNC: 30.4 PG — SIGNIFICANT CHANGE UP (ref 27–34)
MCHC RBC-ENTMCNC: 30.6 PG — SIGNIFICANT CHANGE UP (ref 27–34)
MCHC RBC-ENTMCNC: 35.8 GM/DL — SIGNIFICANT CHANGE UP (ref 32–36)
MCHC RBC-ENTMCNC: 35.8 GM/DL — SIGNIFICANT CHANGE UP (ref 32–36)
MCV RBC AUTO: 85 FL — SIGNIFICANT CHANGE UP (ref 80–100)
MCV RBC AUTO: 85.5 FL — SIGNIFICANT CHANGE UP (ref 80–100)
MONOCYTES # BLD AUTO: 0.62 K/UL — SIGNIFICANT CHANGE UP (ref 0–0.9)
MONOCYTES NFR BLD AUTO: 4.6 % — SIGNIFICANT CHANGE UP (ref 2–14)
NEUTROPHILS # BLD AUTO: 11.96 K/UL — HIGH (ref 1.8–7.4)
NEUTROPHILS NFR BLD AUTO: 88.5 % — HIGH (ref 43–77)
NRBC # BLD: 0 /100 WBCS — SIGNIFICANT CHANGE UP (ref 0–0)
NRBC # BLD: 0 /100 WBCS — SIGNIFICANT CHANGE UP (ref 0–0)
NRBC # FLD: 0 K/UL — SIGNIFICANT CHANGE UP (ref 0–0)
NRBC # FLD: 0 K/UL — SIGNIFICANT CHANGE UP (ref 0–0)
PHOSPHATE SERPL-MCNC: 3.6 MG/DL — SIGNIFICANT CHANGE UP (ref 2.5–4.5)
PLATELET # BLD AUTO: 191 K/UL — SIGNIFICANT CHANGE UP (ref 150–400)
PLATELET # BLD AUTO: 282 K/UL — SIGNIFICANT CHANGE UP (ref 150–400)
POTASSIUM SERPL-MCNC: 4.9 MMOL/L — SIGNIFICANT CHANGE UP (ref 3.5–5.3)
POTASSIUM SERPL-MCNC: 5.1 MMOL/L — SIGNIFICANT CHANGE UP (ref 3.5–5.3)
POTASSIUM SERPL-SCNC: 4.9 MMOL/L — SIGNIFICANT CHANGE UP (ref 3.5–5.3)
POTASSIUM SERPL-SCNC: 5.1 MMOL/L — SIGNIFICANT CHANGE UP (ref 3.5–5.3)
PROT SERPL-MCNC: 6.9 G/DL — SIGNIFICANT CHANGE UP (ref 6–8.3)
PROT SERPL-MCNC: 7 G/DL — SIGNIFICANT CHANGE UP (ref 6–8.3)
PROTHROM AB SERPL-ACNC: 17.2 SEC — HIGH (ref 9.5–13)
RBC # BLD: 4.15 M/UL — LOW (ref 4.2–5.8)
RBC # BLD: 4.34 M/UL — SIGNIFICANT CHANGE UP (ref 4.2–5.8)
RBC # FLD: 22.5 % — HIGH (ref 10.3–14.5)
RBC # FLD: 22.7 % — HIGH (ref 10.3–14.5)
SODIUM SERPL-SCNC: 125 MMOL/L — LOW (ref 135–145)
SODIUM SERPL-SCNC: 126 MMOL/L — LOW (ref 135–145)
SODIUM UR-SCNC: 23 MMOL/L — SIGNIFICANT CHANGE UP
SURGICAL PATHOLOGY STUDY: SIGNIFICANT CHANGE UP
WBC # BLD: 13.52 K/UL — HIGH (ref 3.8–10.5)
WBC # BLD: 9.5 K/UL — SIGNIFICANT CHANGE UP (ref 3.8–10.5)
WBC # FLD AUTO: 13.52 K/UL — HIGH (ref 3.8–10.5)
WBC # FLD AUTO: 9.5 K/UL — SIGNIFICANT CHANGE UP (ref 3.8–10.5)

## 2024-05-23 PROCEDURE — 99233 SBSQ HOSP IP/OBS HIGH 50: CPT | Mod: GC

## 2024-05-23 PROCEDURE — 99232 SBSQ HOSP IP/OBS MODERATE 35: CPT

## 2024-05-23 PROCEDURE — 76770 US EXAM ABDO BACK WALL COMP: CPT | Mod: 26

## 2024-05-23 RX ORDER — INSULIN LISPRO 100/ML
8 VIAL (ML) SUBCUTANEOUS
Refills: 0 | Status: DISCONTINUED | OUTPATIENT
Start: 2024-05-23 | End: 2024-05-24

## 2024-05-23 RX ORDER — SODIUM CHLORIDE 9 MG/ML
1000 INJECTION INTRAMUSCULAR; INTRAVENOUS; SUBCUTANEOUS
Refills: 0 | Status: DISCONTINUED | OUTPATIENT
Start: 2024-05-23 | End: 2024-05-23

## 2024-05-23 RX ORDER — INSULIN GLARGINE 100 [IU]/ML
20 INJECTION, SOLUTION SUBCUTANEOUS AT BEDTIME
Refills: 0 | Status: DISCONTINUED | OUTPATIENT
Start: 2024-05-23 | End: 2024-05-24

## 2024-05-23 RX ORDER — FAMOTIDINE 10 MG/ML
20 INJECTION INTRAVENOUS ONCE
Refills: 0 | Status: COMPLETED | OUTPATIENT
Start: 2024-05-23 | End: 2024-05-23

## 2024-05-23 RX ORDER — SODIUM CHLORIDE 9 MG/ML
1000 INJECTION INTRAMUSCULAR; INTRAVENOUS; SUBCUTANEOUS
Refills: 0 | Status: DISCONTINUED | OUTPATIENT
Start: 2024-05-23 | End: 2024-05-24

## 2024-05-23 RX ORDER — INSULIN LISPRO 100/ML
7 VIAL (ML) SUBCUTANEOUS
Refills: 0 | Status: DISCONTINUED | OUTPATIENT
Start: 2024-05-23 | End: 2024-05-23

## 2024-05-23 RX ADMIN — URSODIOL 250 MILLIGRAM(S): 250 TABLET, FILM COATED ORAL at 18:03

## 2024-05-23 RX ADMIN — ATORVASTATIN CALCIUM 40 MILLIGRAM(S): 80 TABLET, FILM COATED ORAL at 21:52

## 2024-05-23 RX ADMIN — Medication 25 MILLIGRAM(S): at 23:02

## 2024-05-23 RX ADMIN — HEPARIN SODIUM 5000 UNIT(S): 5000 INJECTION INTRAVENOUS; SUBCUTANEOUS at 21:52

## 2024-05-23 RX ADMIN — Medication 1: at 18:04

## 2024-05-23 RX ADMIN — Medication 7 UNIT(S): at 13:14

## 2024-05-23 RX ADMIN — POLYETHYLENE GLYCOL 3350 17 GRAM(S): 17 POWDER, FOR SOLUTION ORAL at 13:14

## 2024-05-23 RX ADMIN — FAMOTIDINE 40 MILLIGRAM(S): 10 INJECTION INTRAVENOUS at 13:15

## 2024-05-23 RX ADMIN — URSODIOL 250 MILLIGRAM(S): 250 TABLET, FILM COATED ORAL at 06:02

## 2024-05-23 RX ADMIN — SODIUM CHLORIDE 75 MILLILITER(S): 9 INJECTION INTRAMUSCULAR; INTRAVENOUS; SUBCUTANEOUS at 13:31

## 2024-05-23 RX ADMIN — Medication 25 MILLIGRAM(S): at 15:50

## 2024-05-23 RX ADMIN — CHOLESTYRAMINE 4 GRAM(S): 4 POWDER, FOR SUSPENSION ORAL at 09:26

## 2024-05-23 RX ADMIN — SODIUM CHLORIDE 75 MILLILITER(S): 9 INJECTION INTRAMUSCULAR; INTRAVENOUS; SUBCUTANEOUS at 19:58

## 2024-05-23 RX ADMIN — SENNA PLUS 2 TABLET(S): 8.6 TABLET ORAL at 21:52

## 2024-05-23 RX ADMIN — Medication 3: at 09:25

## 2024-05-23 RX ADMIN — HEPARIN SODIUM 5000 UNIT(S): 5000 INJECTION INTRAVENOUS; SUBCUTANEOUS at 13:20

## 2024-05-23 RX ADMIN — FAMOTIDINE 20 MILLIGRAM(S): 10 INJECTION INTRAVENOUS at 21:49

## 2024-05-23 RX ADMIN — Medication 2: at 13:13

## 2024-05-23 RX ADMIN — HEPARIN SODIUM 5000 UNIT(S): 5000 INJECTION INTRAVENOUS; SUBCUTANEOUS at 06:04

## 2024-05-23 RX ADMIN — INSULIN GLARGINE 20 UNIT(S): 100 INJECTION, SOLUTION SUBCUTANEOUS at 22:35

## 2024-05-23 RX ADMIN — Medication 7 UNIT(S): at 09:26

## 2024-05-23 RX ADMIN — Medication 8 UNIT(S): at 18:04

## 2024-05-23 NOTE — CHART NOTE - NSCHARTNOTEFT_GEN_A_CORE
Brief  Update Note    Bilirubin uptrending, otherwise stable and afebrile  Continue to trend bili, if continues to increase, obtain repeat CTAP  See prior note for full recs  Rest of care per primary      Thank you for involving us in this patient's care. Please reach out if any further questions or concerns.    Anastasia Lu MD  Gastroenterology/Hepatology Fellow, PGY-7    [facility] ROUTINE CONSULT 24/7: [email or web address] CONSULT 24/7: [email or web address]  For urgent consults during the weekends (all day) and weeknights (5PM to 7 AM) please:  1. Contact on call GI team via page followed by TEAMS Call if no response  2. If no response, call the answering service ([phone])

## 2024-05-23 NOTE — PROGRESS NOTE ADULT - PROBLEM SELECTOR PLAN 1
Pt with 2 weeks of painless jaundice, associated with 25-lb weight loss, foamy dark urine, and changes to his bowel habits and stool caliber for the past 1 month. LFTs significant for t bili 29.5, direct bili >20, alk phos 532, AST 71, and ALT 38, consistent with cholestasis in setting of pancreatic mass causing extrahepatic and intrahepatic biliary ductal dilatation, including CBD dilation to 2.2 cm.    - f/u GI recs -> ERCP/EUS today 5/21   - daily LFTs  - Low threshold to start antibiotics if pt spikes fever, has worsening leukocytosis, or increasing hyperbilirubinemia due to concern for cholangitis given CBD dilatation   - continue Atarax 25 mg q8hrs PRN for itching and cholestyramine 4 g daily, urosodiol 250mg bid   - C/w famotidine 40 mg daily Pt with 2 weeks of painless jaundice, associated with 25-lb weight loss, foamy dark urine, and changes to his bowel habits and stool caliber for the past 1 month. LFTs significant for t bili 29.5, direct bili >20, alk phos 532, AST 71, and ALT 38, consistent with cholestasis in setting of pancreatic mass causing extrahepatic and intrahepatic biliary ductal dilatation, including CBD dilation to 2.2 cm.  s/p ERCP w/ stent placement, still w/ continued elevation of T bili, ALP, AST,ALT    - daily LFTs  - Low threshold to start antibiotics if pt spikes fever, has worsening leukocytosis, or increasing hyperbilirubinemia due to concern for cholangitis given CBD dilatation   - continue Atarax 25 mg q8hrs PRN for itching and cholestyramine 4 g daily, urosodiol 250mg bid   - C/w famotidine 40 mg daily

## 2024-05-23 NOTE — PROGRESS NOTE ADULT - PROBLEM SELECTOR PLAN 5
BPs in acceptable range on admission. Pt on atenolol 50 mg daily and some other BP med that pt cannot remember the name of at this time (?lisinopril 20 mg daily).  - C/w atenolol 50 mg daily   - med rec   - Monitor VS q8hrs BPs in acceptable range on admission. Pt on atenolol 50 mg daily and some other BP med that pt cannot remember the name of at this time (?lisinopril 20 mg daily).    - C/w atenolol 50 mg daily   - med rec   - Monitor VS q8hrs

## 2024-05-23 NOTE — PROGRESS NOTE ADULT - PROBLEM SELECTOR PLAN 10
- C/w famotidine 40 mg daily Pt on Ventolin nebulizer PRN at home. No S/S of exacerbation at this time.  - Duonebs PRN for SOB and/or wheezing ordered

## 2024-05-23 NOTE — PROGRESS NOTE ADULT - PROBLEM SELECTOR PLAN 8
Pt no longer on methimazole for hyperthyroidism, only on atenolol 50 mg daily. TSH on admission 0.70.  - C/w atenolol 50 mg daily  - C/w outpatient follow-up visits with endocrine Pt with serum HCO3 19 with AG 18 on admission. On VBG, pH 7.35 and lactate 3.1. Possibly from type B lactic acidosis in setting of pancreatic malignancy. Unlikely to be DKA, as BHB negative.     - continue to monitor

## 2024-05-23 NOTE — PROGRESS NOTE ADULT - PROBLEM SELECTOR PLAN 4
WBC 10.83 on admission. Pt afebrile, did not meet SIRS criteria. Possibly from malignancy.     RESOLVED     - daily cbc WBC 10.83 on admission. Pt afebrile, did not meet SIRS criteria. Possibly from malignancy.   Bump in WBC from 7.47-> 13.52.     - daily cbc  - monitor fever curve

## 2024-05-23 NOTE — PROGRESS NOTE ADULT - PROBLEM SELECTOR PLAN 12
- DVT ppx: Lovenox SQ 40 mg daily, hold prior to any procedure  - Diet: NPO for ERCP/EUS today   - dispo: pending clinical course Pt not on CPAP at home.   - Monitor SpO2 during sleep

## 2024-05-23 NOTE — PROGRESS NOTE ADULT - PROBLEM SELECTOR PLAN 3
Pt with serum HCO3 19 with AG 18 on admission. On VBG, pH 7.35 and lactate 3.1. Possibly from type B lactic acidosis in setting of pancreatic malignancy. Unlikely to be DKA, as BHB negative and UA negative for ketones.     RESOLVED Cr 0.86 on admission ->2.82. Fena 0.3% prerenal, BUN 19. Suspect prerenal in setting of dehydration vs NELSY?(contrast received 5/17 so past the 24-48 hrs).     - mIVF 75cc/hr  - bladder scan to check for urinary retention  - repeat BMP in PM Cr 0.86 on admission ->2.82. Fena 0.3% prerenal, BUN 19. Suspect prerenal in setting of dehydration vs intrinsic- NELSY?(contrast received 5/17 so past the 24-48 hrs).     - mIVF 75cc/hr  - U/A   - u/s kidney and bladder  - repeat BMP in PM  - monitor urine output  - avoid nephrotoxic agents  - renally dose meds

## 2024-05-23 NOTE — PROGRESS NOTE ADULT - SUBJECTIVE AND OBJECTIVE BOX
*******************************  Ava Helm MD (PGY-1)  Internal Medicine  Contact via [employer] TEAMS  *******************************    ADA COX  63y  Male    Patient is a 63y old  Male who presents with a chief complaint of Jaundice, pancreatic mass (22 May 2024 17:49)      Subjective:    Objective:  T(C): 36.5 (05-23-24 @ 05:28), Max: 37.1 (05-22-24 @ 13:30)  HR: 57 (05-23-24 @ 05:28) (56 - 66)  BP: 116/69 (05-23-24 @ 05:28) (97/58 - 116/69)  RR: 18 (05-23-24 @ 05:28) (17 - 18)  SpO2: 97% (05-23-24 @ 05:28) (95% - 100%)  I&O's Summary    22 May 2024 07:01  -  23 May 2024 07:00  --------------------------------------------------------  IN: 0 mL / OUT: 200 mL / NET: -200 mL        PHYSICAL EXAM:  GENERAL: NAD  HEAD:  Atraumatic, Normocephalic  EYES: EOMI, PERRLA, conjunctiva and sclera clear  ENMT: Moist mucous membranes  NECK: Supple, No JVD, trachea midline   NERVOUS SYSTEM:  Alert & Oriented X3, Good concentration; Motor Strength 5/5 B/L upper and lower extremities; DTRs 2+ intact and symmetric  CHEST/LUNG: Clear to auscultation bilaterally; No rales, rhonchi, wheezing, or rubs  HEART: Regular rate and rhythm; No murmurs, rubs, or gallops  ABDOMEN: Soft, Nontender, Nondistended; Bowel sounds present  EXTREMITIES:  2+ Peripheral Pulses, No clubbing, cyanosis, or edema  LYMPH: No lymphadenopathy noted  SKIN: No rashes or lesions    MEDICATIONS  (STANDING):  atenolol  Tablet 50 milliGRAM(s) Oral daily  atorvastatin 40 milliGRAM(s) Oral at bedtime  cholestyramine Powder (Sugar-Free) 4 Gram(s) Oral daily  dextrose 10% Bolus 125 milliLiter(s) IV Bolus once  dextrose 5%. 1000 milliLiter(s) (50 mL/Hr) IV Continuous <Continuous>  dextrose 5%. 1000 milliLiter(s) (100 mL/Hr) IV Continuous <Continuous>  dextrose 50% Injectable 12.5 Gram(s) IV Push once  dextrose 50% Injectable 25 Gram(s) IV Push once  famotidine    Tablet 40 milliGRAM(s) Oral daily  glucagon  Injectable 1 milliGRAM(s) IntraMuscular once  heparin   Injectable 5000 Unit(s) SubCutaneous every 8 hours  insulin glargine Injectable (LANTUS) 15 Unit(s) SubCutaneous at bedtime  insulin lispro (ADMELOG) corrective regimen sliding scale   SubCutaneous at bedtime  insulin lispro (ADMELOG) corrective regimen sliding scale   SubCutaneous three times a day before meals  insulin lispro Injectable (ADMELOG) 5 Unit(s) SubCutaneous three times a day before meals  polyethylene glycol 3350 17 Gram(s) Oral daily  senna 2 Tablet(s) Oral at bedtime  ursodiol Tablet 250 milliGRAM(s) Oral two times a day    MEDICATIONS  (PRN):  acetaminophen     Tablet .. 650 milliGRAM(s) Oral every 6 hours PRN Temp greater or equal to 38C (100.4F), Mild Pain (1 - 3), Moderate Pain (4 - 6), Severe Pain (7 - 10)  albuterol/ipratropium for Nebulization 3 milliLiter(s) Nebulizer every 6 hours PRN Shortness of Breath and/or Wheezing  dextrose Oral Gel 15 Gram(s) Oral once PRN Blood Glucose LESS THAN 70 milliGRAM(s)/deciliter  hydrOXYzine hydrochloride 25 milliGRAM(s) Oral every 8 hours PRN Itching  petrolatum white Ointment 1 Application(s) Topical every 6 hours PRN Dry lips      LABS:        CAPILLARY BLOOD GLUCOSE      POCT Blood Glucose.: 213 mg/dL (22 May 2024 22:13)  POCT Blood Glucose.: 263 mg/dL (22 May 2024 17:44)  POCT Blood Glucose.: 330 mg/dL (22 May 2024 12:26)  POCT Blood Glucose.: 371 mg/dL (22 May 2024 08:25)      RADIOLOGY & ADDITIONAL TESTS:               *******************************  Ava Helm MD (PGY-1)  Internal Medicine  Contact via [employer] TEAMS  *******************************    ADA COX  63y  Male    Patient is a 63y old  Male who presents with a chief complaint of Jaundice, pancreatic mass (22 May 2024 17:49)    Subjective: No acute events overnight. Seen at bedside this morning. Endorsed generalized pruritus. Denied any fever, chills, chest pain, sob, ab pain, n/v.     Objective:  T(C): 36.5 (05-23-24 @ 05:28), Max: 37.1 (05-22-24 @ 13:30)  HR: 57 (05-23-24 @ 05:28) (56 - 66)  BP: 116/69 (05-23-24 @ 05:28) (97/58 - 116/69)  RR: 18 (05-23-24 @ 05:28) (17 - 18)  SpO2: 97% (05-23-24 @ 05:28) (95% - 100%)  I&O's Summary    22 May 2024 07:01  -  23 May 2024 07:00  --------------------------------------------------------  IN: 0 mL / OUT: 200 mL / NET: -200 mL    PHYSICAL EXAM:  GENERAL: NAD  HEAD:  Atraumatic, Normocephalic  EYES: EOMI, PERRLA, scleral icterus  ENMT: Moist mucous membranes  NECK: Supple, No JVD, trachea midline   NERVOUS SYSTEM:  Alert & Oriented X3, Good concentration  CHEST/LUNG: Clear to auscultation bilaterally; No rales, rhonchi, wheezing, or rubs  HEART: Regular rate and rhythm; No murmurs, rubs, or gallops  ABDOMEN: Soft, Nontender, Nondistended; Bowel sounds present  EXTREMITIES:  2+ Peripheral Pulses, No clubbing, cyanosis, or edema  SKIN: jaundice    MEDICATIONS  (STANDING):  atenolol  Tablet 50 milliGRAM(s) Oral daily  atorvastatin 40 milliGRAM(s) Oral at bedtime  cholestyramine Powder (Sugar-Free) 4 Gram(s) Oral daily  dextrose 10% Bolus 125 milliLiter(s) IV Bolus once  dextrose 5%. 1000 milliLiter(s) (50 mL/Hr) IV Continuous <Continuous>  dextrose 5%. 1000 milliLiter(s) (100 mL/Hr) IV Continuous <Continuous>  dextrose 50% Injectable 12.5 Gram(s) IV Push once  dextrose 50% Injectable 25 Gram(s) IV Push once  famotidine    Tablet 40 milliGRAM(s) Oral daily  glucagon  Injectable 1 milliGRAM(s) IntraMuscular once  heparin   Injectable 5000 Unit(s) SubCutaneous every 8 hours  insulin glargine Injectable (LANTUS) 15 Unit(s) SubCutaneous at bedtime  insulin lispro (ADMELOG) corrective regimen sliding scale   SubCutaneous at bedtime  insulin lispro (ADMELOG) corrective regimen sliding scale   SubCutaneous three times a day before meals  insulin lispro Injectable (ADMELOG) 5 Unit(s) SubCutaneous three times a day before meals  polyethylene glycol 3350 17 Gram(s) Oral daily  senna 2 Tablet(s) Oral at bedtime  ursodiol Tablet 250 milliGRAM(s) Oral two times a day    MEDICATIONS  (PRN):  acetaminophen     Tablet .. 650 milliGRAM(s) Oral every 6 hours PRN Temp greater or equal to 38C (100.4F), Mild Pain (1 - 3), Moderate Pain (4 - 6), Severe Pain (7 - 10)  albuterol/ipratropium for Nebulization 3 milliLiter(s) Nebulizer every 6 hours PRN Shortness of Breath and/or Wheezing  dextrose Oral Gel 15 Gram(s) Oral once PRN Blood Glucose LESS THAN 70 milliGRAM(s)/deciliter  hydrOXYzine hydrochloride 25 milliGRAM(s) Oral every 8 hours PRN Itching  petrolatum white Ointment 1 Application(s) Topical every 6 hours PRN Dry lips    LABS:                          12.7   13.52 )-----------( 282      ( 23 May 2024 07:15 )             35.5       05-23    125<L>  |  91<L>  |  54<H>  ----------------------------<  277<H>  4.9   |  16<L>  |  2.82<H>    Ca    8.4      23 May 2024 07:15  Phos  3.6     05-23  Mg     2.20     05-23    TPro  7.0  /  Alb  2.8<L>  /  TBili  26.8<H>  /  DBili  x   /  AST  121<H>  /  ALT  44<H>  /  AlkPhos  516<H>  05-23        PT/INR - ( 23 May 2024 07:15 )   PT: 17.2 sec;   INR: 1.56 ratio    PTT - ( 23 May 2024 07:15 )  PTT:45.2 sec    Culture Results:   <10,000 CFU/mL Normal Urogenital Vicki (05-17 @ 20:35)    CAPILLARY BLOOD GLUCOSE  POCT Blood Glucose.: 265 mg/dL (23 May 2024 08:50)  POCT Blood Glucose.: 213 mg/dL (22 May 2024 22:13)  POCT Blood Glucose.: 263 mg/dL (22 May 2024 17:44)  POCT Blood Glucose.: 330 mg/dL (22 May 2024 12:26)  POCT Blood Glucose.: 371 mg/dL (22 May 2024 08:25)    RADIOLOGY & ADDITIONAL TESTS:

## 2024-05-23 NOTE — PROGRESS NOTE ADULT - PROBLEM SELECTOR PLAN 2
CTAP with IV contrast demonstrating a 3.5 x 4.5 x 3.0 cm heterogenous solid mass in the pancreatic head/uncinate process with upstream main pancreatic ductal dilatation as well as extrahepatic and intrahepatic biliary ductal dilatation, including CBD dilation to 2.2 cm. Suspicious for pancreatic malignancy.  Elevated CA 19-9 and CEA.     - surg onc consulted -> f/u biopsy results   - Plan as above for cholestatic jaundice  - will need outpatient onc eval CTAP with IV contrast demonstrating a 3.5 x 4.5 x 3.0 cm heterogenous solid mass in the pancreatic head/uncinate process with upstream main pancreatic ductal dilatation as well as extrahepatic and intrahepatic biliary ductal dilatation, including CBD dilation to 2.2 cm. Suspicious for pancreatic malignancy.  Elevated CA 19-9 and CEA.   s/p EUS W/ biopsy    - surg onc consulted -> f/u biopsy results   - Plan as above for cholestatic jaundice  - will need outpatient onc eval

## 2024-05-23 NOTE — PROGRESS NOTE ADULT - ASSESSMENT
62 yo man with history of HTN, HLD, type 2 DM (on insulin), hyperthyroidism, asthma, GERD, and RAMANA (not on CPAP) presents with 2 weeks of painless jaundice, found to have  a 3.5 x 4.5 x 3.0 cm heterogenous solid mass in the pancreatic head/uncinate process with extrahepatic and intrahepatic biliary dilatation. Endocrinology consulted for assistance with management of uncontrolled DM2 w/ hyperglycemia and hypoglycemia iso newly discovered pancreatic mass.   Patient is high risk with high level decision making due to uncontrolled diabetes with A1C>9, new pancreatic mass and SOFIE which places patient at high risk for cardiovascular and cerebrovascular events. Patient with lability of glucose requiring close monitoring and insulin adjustments.    #Uncontrolled DM2 w/ hypo& hyperglycemia   A1c 9%   newly found pancreatic mass   egfr 68 - SOFIE  higher basal/bolus insulin requirements at home but likely that po intake/diet is also much different   Recommendations:   - BG goal 100-180: FS above goal today.   - Increase to Lantus 20 units SC QHS   - Increase Admelog to 8 units SC Premeal/TIDAC - hold if npo or if eating < 50% of meals   - Continue Admelog Low Correction Scale Premeal & Separate LOW Correction Scale Bedtime - change to q6h if npo   - Goal -180  - FS Blood glucose monitoring Premeal/Bedtime - change to q6h if npo   - Hypoglycemia protocol   - Carb Consistent Diet   - Nutrition consult   DC Planning: Basal/bolus insulin pens. Doses TBD closer to discharge pending insulin requirements/clinical course.  Please ensure patient has prescriptions for diabetes supplies (glucometer, test strips, lancets, alcohol swabs, insulin pen needles).   **Patient interested in CGM- Please send script for CAPNIA 3 sensors and reader (d/w primary team).  Routine outpatient opthalmology & podiatry evaluations recommended. Patient can follow up with endocrinology at the location provided below: D/w that he needs endocrinologist outpt and he expressed wanting to ask his PCP to recommend one but also wants our information in his discharge incase he wants to call to make an appointment.     [facility]   [address] [place] [address] [phone]    #Hx of Hyperthyroidism  #Hx of Thyroid Nodule   - outpatient endocrine follow up  - not on Methimazole   - on atenolol   - TSH 5/17 wnl at 0.7  -Discharge on atenolol and follow up with PCP/endocrinologist to monitor TFT's and repeat them in 4-6 weeks.     #HTN  Recommendations:   - outpt BP goal < 130/80   - defer to primary team   - outpatient annual urinary microalb/cr ratio  - on atenolol     #HLD  Recommendations:   - LDL goal < 70   - defer to primary team   - outpatient fasting lipid profile   - on atorvastatin 40mg qhs     d/w primary team resident Dr. Helm.     Derrick Phipps [facility]-BC  Nurse Practitioner  Division of Endocrinology & Diabetes  pager [phone]     If before 9AM or after 5PM, or on weekends/holidays, please call the Endocrine answering service for assistance ([phone]).  For nonurgent matters, please email [email or web address] or [email or web address]

## 2024-05-23 NOTE — PROGRESS NOTE ADULT - PROBLEM SELECTOR PLAN 6
- C/w atorvastatin 40 mg qHS as therapeutic interchange for rosuvastatin 10 mg qHS  - Check lipid profile - C/w atorvastatin 40 mg qHS as therapeutic interchange for rosuvastatin 10 mg qHS  - f/u lipid profile

## 2024-05-23 NOTE — PROGRESS NOTE ADULT - PROBLEM SELECTOR PLAN 7
Pt states that he now takes Lantus 50 u qHS and Admelog 50 u qAM before breakfast at home.   A1c 9%. Episodes of hypoglycemia while NPO    - C/w Lantus 10 mg qhs   - low dose insulin sliding scale premeal and bedtime  - hypoglycemia protocol Pt states that he now takes Lantus 50 u qHS and Admelog 50 u qAM before breakfast at home.   A1c 9%. Episodes of hypoglycemia while NPO    - appreciate endo recs  - increase lantus to 20 units at bedtime, admelog 7 units premeal   - low dose insulin sliding scale premeal and bedtime  - hypoglycemia protocol

## 2024-05-23 NOTE — PROGRESS NOTE ADULT - PROBLEM SELECTOR PLAN 9
Pt on Ventolin nebulizer PRN at home. No S/S of exacerbation at this time.  - Duonebs PRN for SOB and/or wheezing ordered Pt no longer on methimazole for hyperthyroidism, only on atenolol 50 mg daily. TSH on admission 0.70.  - C/w atenolol 50 mg daily  - C/w outpatient follow-up visits with endocrine

## 2024-05-23 NOTE — PROGRESS NOTE ADULT - SUBJECTIVE AND OBJECTIVE BOX
Chief Complaint: type 2 DM    History: Tolerating PO diet. No hypoglycemia. FS above goal today. No nausea or abdominal pain.     MEDICATIONS  (STANDING):  atenolol  Tablet 50 milliGRAM(s) Oral daily  atorvastatin 40 milliGRAM(s) Oral at bedtime  cholestyramine Powder (Sugar-Free) 4 Gram(s) Oral daily  dextrose 10% Bolus 125 milliLiter(s) IV Bolus once  dextrose 5%. 1000 milliLiter(s) (50 mL/Hr) IV Continuous <Continuous>  dextrose 5%. 1000 milliLiter(s) (100 mL/Hr) IV Continuous <Continuous>  dextrose 50% Injectable 12.5 Gram(s) IV Push once  dextrose 50% Injectable 25 Gram(s) IV Push once  famotidine    Tablet 40 milliGRAM(s) Oral daily  glucagon  Injectable 1 milliGRAM(s) IntraMuscular once  heparin   Injectable 5000 Unit(s) SubCutaneous every 8 hours  insulin glargine Injectable (LANTUS) 20 Unit(s) SubCutaneous at bedtime  insulin lispro (ADMELOG) corrective regimen sliding scale   SubCutaneous three times a day before meals  insulin lispro (ADMELOG) corrective regimen sliding scale   SubCutaneous at bedtime  insulin lispro Injectable (ADMELOG) 7 Unit(s) SubCutaneous three times a day before meals  polyethylene glycol 3350 17 Gram(s) Oral daily  senna 2 Tablet(s) Oral at bedtime  sodium chloride 0.9%. 1000 milliLiter(s) (75 mL/Hr) IV Continuous <Continuous>  ursodiol Tablet 250 milliGRAM(s) Oral two times a day    MEDICATIONS  (PRN):  acetaminophen     Tablet .. 650 milliGRAM(s) Oral every 6 hours PRN Temp greater or equal to 38C (100.4F), Mild Pain (1 - 3), Moderate Pain (4 - 6), Severe Pain (7 - 10)  albuterol/ipratropium for Nebulization 3 milliLiter(s) Nebulizer every 6 hours PRN Shortness of Breath and/or Wheezing  dextrose Oral Gel 15 Gram(s) Oral once PRN Blood Glucose LESS THAN 70 milliGRAM(s)/deciliter  hydrOXYzine hydrochloride 25 milliGRAM(s) Oral every 8 hours PRN Itching  petrolatum white Ointment 1 Application(s) Topical every 6 hours PRN Dry lips      Allergies    No Known Allergies    Intolerances      Review of Systems:  Cardiovascular: No chest pain, palpitations  Respiratory: No SOB, no cough  GI: No nausea, vomiting, abdominal pain  : No dysuria  Endocrine: no polyuria, polydipsia    PHYSICAL EXAM:  VITALS: T(C): 36.5 (05-23-24 @ 05:28)  T(F): 97.7 (05-23-24 @ 05:28), Max: 98.7 (05-22-24 @ 13:30)  HR: 57 (05-23-24 @ 05:28) (56 - 66)  BP: 116/69 (05-23-24 @ 05:28) (97/58 - 116/69)  RR:  (17 - 18)  SpO2:  (95% - 100%)  Wt(kg): --  GENERAL: NAD, well-groomed, well-developed  EYES: No proptosis  HEENT:  Atraumatic, Normocephalic  RESPIRATORY: non labored breathing   CARDIOVASCULAR: Regular rate and rhythm  GI: Soft, nontender, non distended  PSYCH: Alert and oriented x 3, normal affect, normal mood       CAPILLARY BLOOD GLUCOSE      POCT Blood Glucose.: 212 mg/dL (23 May 2024 12:19)  POCT Blood Glucose.: 265 mg/dL (23 May 2024 08:50)  POCT Blood Glucose.: 213 mg/dL (22 May 2024 22:13)  POCT Blood Glucose.: 263 mg/dL (22 May 2024 17:44)      05-23    125<L>  |  91<L>  |  54<H>  ----------------------------<  277<H>  4.9   |  16<L>  |  2.82<H>    eGFR: 24<L>    Ca    8.4      05-23  Mg     2.20     05-23  Phos  3.6     05-23    TPro  7.0  /  Alb  2.8<L>  /  TBili  26.8<H>  /  DBili  x   /  AST  121<H>  /  ALT  44<H>  /  AlkPhos  516<H>  05-23          Thyroid Function Tests:  05-17 @ 16:22 TSH 0.70 FreeT4 -- T3 -- Anti TPO -- Anti Thyroglobulin Ab -- TSI --        A1C with Estimated Average Glucose Result: 9.0 % (05-18-24 @ 06:35)          Diet, DASH/TLC:   Sodium & Cholesterol Restricted  Consistent Carbohydrate No Snacks (CSTCHO)  No Pork (05-22-24 @ 11:49)

## 2024-05-24 LAB
ADD ON TEST-SPECIMEN IN LAB: SIGNIFICANT CHANGE UP
ALBUMIN SERPL ELPH-MCNC: 2.9 G/DL — LOW (ref 3.3–5)
ALP SERPL-CCNC: 535 U/L — HIGH (ref 40–120)
ALT FLD-CCNC: 55 U/L — HIGH (ref 4–41)
ANION GAP SERPL CALC-SCNC: 17 MMOL/L — HIGH (ref 7–14)
APTT BLD: 50.2 SEC — HIGH (ref 24.5–35.6)
AST SERPL-CCNC: 169 U/L — HIGH (ref 4–40)
BILIRUB DIRECT SERPL-MCNC: >20 MG/DL — HIGH (ref 0–0.3)
BILIRUB INDIRECT FLD-MCNC: SIGNIFICANT CHANGE UP MG/DL (ref 0–1)
BILIRUB SERPL-MCNC: 26.9 MG/DL — HIGH (ref 0.2–1.2)
BILIRUB SERPL-MCNC: 26.9 MG/DL — HIGH (ref 0.2–1.2)
BUN SERPL-MCNC: 57 MG/DL — HIGH (ref 7–23)
CALCIUM SERPL-MCNC: 8.4 MG/DL — SIGNIFICANT CHANGE UP (ref 8.4–10.5)
CHLORIDE SERPL-SCNC: 101 MMOL/L — SIGNIFICANT CHANGE UP (ref 98–107)
CO2 SERPL-SCNC: 18 MMOL/L — LOW (ref 22–31)
CREAT SERPL-MCNC: 2.2 MG/DL — HIGH (ref 0.5–1.3)
EGFR: 33 ML/MIN/1.73M2 — LOW
GLUCOSE BLDC GLUCOMTR-MCNC: 104 MG/DL — HIGH (ref 70–99)
GLUCOSE BLDC GLUCOMTR-MCNC: 112 MG/DL — HIGH (ref 70–99)
GLUCOSE BLDC GLUCOMTR-MCNC: 115 MG/DL — HIGH (ref 70–99)
GLUCOSE BLDC GLUCOMTR-MCNC: 137 MG/DL — HIGH (ref 70–99)
GLUCOSE BLDC GLUCOMTR-MCNC: 145 MG/DL — HIGH (ref 70–99)
GLUCOSE BLDC GLUCOMTR-MCNC: 45 MG/DL — CRITICAL LOW (ref 70–99)
GLUCOSE BLDC GLUCOMTR-MCNC: 48 MG/DL — CRITICAL LOW (ref 70–99)
GLUCOSE BLDC GLUCOMTR-MCNC: 49 MG/DL — CRITICAL LOW (ref 70–99)
GLUCOSE BLDC GLUCOMTR-MCNC: 49 MG/DL — CRITICAL LOW (ref 70–99)
GLUCOSE SERPL-MCNC: 129 MG/DL — HIGH (ref 70–99)
HCT VFR BLD CALC: 37.3 % — LOW (ref 39–50)
HGB BLD-MCNC: 13.4 G/DL — SIGNIFICANT CHANGE UP (ref 13–17)
INR BLD: 1.42 RATIO — HIGH (ref 0.85–1.18)
LIDOCAIN IGE QN: 57 U/L — SIGNIFICANT CHANGE UP (ref 7–60)
MAGNESIUM SERPL-MCNC: 2.5 MG/DL — SIGNIFICANT CHANGE UP (ref 1.6–2.6)
MCHC RBC-ENTMCNC: 30.5 PG — SIGNIFICANT CHANGE UP (ref 27–34)
MCHC RBC-ENTMCNC: 35.9 GM/DL — SIGNIFICANT CHANGE UP (ref 32–36)
MCV RBC AUTO: 84.8 FL — SIGNIFICANT CHANGE UP (ref 80–100)
NON-GYNECOLOGICAL CYTOLOGY STUDY: SIGNIFICANT CHANGE UP
NRBC # BLD: 0 /100 WBCS — SIGNIFICANT CHANGE UP (ref 0–0)
NRBC # FLD: 0 K/UL — SIGNIFICANT CHANGE UP (ref 0–0)
PHOSPHATE SERPL-MCNC: 4.1 MG/DL — SIGNIFICANT CHANGE UP (ref 2.5–4.5)
PLATELET # BLD AUTO: 300 K/UL — SIGNIFICANT CHANGE UP (ref 150–400)
POTASSIUM SERPL-MCNC: 4.1 MMOL/L — SIGNIFICANT CHANGE UP (ref 3.5–5.3)
POTASSIUM SERPL-SCNC: 4.1 MMOL/L — SIGNIFICANT CHANGE UP (ref 3.5–5.3)
PROT SERPL-MCNC: 7 G/DL — SIGNIFICANT CHANGE UP (ref 6–8.3)
PROTHROM AB SERPL-ACNC: 15.9 SEC — HIGH (ref 9.5–13)
RBC # BLD: 4.4 M/UL — SIGNIFICANT CHANGE UP (ref 4.2–5.8)
RBC # FLD: 23.1 % — HIGH (ref 10.3–14.5)
SODIUM SERPL-SCNC: 136 MMOL/L — SIGNIFICANT CHANGE UP (ref 135–145)
WBC # BLD: 10.21 K/UL — SIGNIFICANT CHANGE UP (ref 3.8–10.5)
WBC # FLD AUTO: 10.21 K/UL — SIGNIFICANT CHANGE UP (ref 3.8–10.5)

## 2024-05-24 PROCEDURE — 99233 SBSQ HOSP IP/OBS HIGH 50: CPT | Mod: GC

## 2024-05-24 PROCEDURE — 99232 SBSQ HOSP IP/OBS MODERATE 35: CPT

## 2024-05-24 PROCEDURE — 74176 CT ABD & PELVIS W/O CONTRAST: CPT | Mod: 26

## 2024-05-24 PROCEDURE — 99232 SBSQ HOSP IP/OBS MODERATE 35: CPT | Mod: GC

## 2024-05-24 RX ORDER — INSULIN GLARGINE 100 [IU]/ML
10 INJECTION, SOLUTION SUBCUTANEOUS AT BEDTIME
Refills: 0 | Status: DISCONTINUED | OUTPATIENT
Start: 2024-05-24 | End: 2024-05-25

## 2024-05-24 RX ORDER — DEXTROSE 50 % IN WATER 50 %
25 SYRINGE (ML) INTRAVENOUS ONCE
Refills: 0 | Status: COMPLETED | OUTPATIENT
Start: 2024-05-24 | End: 2024-05-24

## 2024-05-24 RX ORDER — HYDROMORPHONE HYDROCHLORIDE 2 MG/ML
0.2 INJECTION INTRAMUSCULAR; INTRAVENOUS; SUBCUTANEOUS ONCE
Refills: 0 | Status: DISCONTINUED | OUTPATIENT
Start: 2024-05-24 | End: 2024-05-24

## 2024-05-24 RX ORDER — SODIUM CHLORIDE 9 MG/ML
1000 INJECTION, SOLUTION INTRAVENOUS
Refills: 0 | Status: COMPLETED | OUTPATIENT
Start: 2024-05-24 | End: 2024-05-25

## 2024-05-24 RX ORDER — INSULIN LISPRO 100/ML
7 VIAL (ML) SUBCUTANEOUS
Refills: 0 | Status: DISCONTINUED | OUTPATIENT
Start: 2024-05-24 | End: 2024-05-25

## 2024-05-24 RX ORDER — LIDOCAINE 4 G/100G
1 CREAM TOPICAL DAILY
Refills: 0 | Status: DISCONTINUED | OUTPATIENT
Start: 2024-05-24 | End: 2024-05-31

## 2024-05-24 RX ADMIN — Medication 0: at 18:09

## 2024-05-24 RX ADMIN — URSODIOL 250 MILLIGRAM(S): 250 TABLET, FILM COATED ORAL at 17:05

## 2024-05-24 RX ADMIN — FAMOTIDINE 40 MILLIGRAM(S): 10 INJECTION INTRAVENOUS at 12:55

## 2024-05-24 RX ADMIN — Medication 0: at 12:57

## 2024-05-24 RX ADMIN — Medication 7 UNIT(S): at 18:09

## 2024-05-24 RX ADMIN — URSODIOL 250 MILLIGRAM(S): 250 TABLET, FILM COATED ORAL at 06:31

## 2024-05-24 RX ADMIN — Medication 25 MILLIGRAM(S): at 06:30

## 2024-05-24 RX ADMIN — HYDROMORPHONE HYDROCHLORIDE 0.2 MILLIGRAM(S): 2 INJECTION INTRAMUSCULAR; INTRAVENOUS; SUBCUTANEOUS at 17:10

## 2024-05-24 RX ADMIN — Medication 650 MILLIGRAM(S): at 13:55

## 2024-05-24 RX ADMIN — Medication 650 MILLIGRAM(S): at 12:55

## 2024-05-24 RX ADMIN — HYDROMORPHONE HYDROCHLORIDE 0.2 MILLIGRAM(S): 2 INJECTION INTRAMUSCULAR; INTRAVENOUS; SUBCUTANEOUS at 16:10

## 2024-05-24 RX ADMIN — CHOLESTYRAMINE 4 GRAM(S): 4 POWDER, FOR SUSPENSION ORAL at 09:08

## 2024-05-24 RX ADMIN — SODIUM CHLORIDE 75 MILLILITER(S): 9 INJECTION, SOLUTION INTRAVENOUS at 19:34

## 2024-05-24 RX ADMIN — LIDOCAINE 1 PATCH: 4 CREAM TOPICAL at 17:05

## 2024-05-24 RX ADMIN — POLYETHYLENE GLYCOL 3350 17 GRAM(S): 17 POWDER, FOR SOLUTION ORAL at 12:56

## 2024-05-24 RX ADMIN — SENNA PLUS 2 TABLET(S): 8.6 TABLET ORAL at 23:59

## 2024-05-24 RX ADMIN — Medication 7 UNIT(S): at 12:57

## 2024-05-24 RX ADMIN — INSULIN GLARGINE 10 UNIT(S): 100 INJECTION, SOLUTION SUBCUTANEOUS at 23:57

## 2024-05-24 RX ADMIN — Medication 0: at 09:09

## 2024-05-24 RX ADMIN — HEPARIN SODIUM 5000 UNIT(S): 5000 INJECTION INTRAVENOUS; SUBCUTANEOUS at 14:10

## 2024-05-24 RX ADMIN — Medication 25 GRAM(S): at 22:17

## 2024-05-24 RX ADMIN — Medication 8 UNIT(S): at 09:09

## 2024-05-24 RX ADMIN — HEPARIN SODIUM 5000 UNIT(S): 5000 INJECTION INTRAVENOUS; SUBCUTANEOUS at 06:31

## 2024-05-24 RX ADMIN — LIDOCAINE 1 PATCH: 4 CREAM TOPICAL at 18:49

## 2024-05-24 NOTE — PROGRESS NOTE ADULT - ASSESSMENT
63M PMH HTN, T2DM (on insulin), hemorrhoid, hyperthyroidism, asthma, GERD, and RAMANA (not on CPAP), surgical history of open cholecystectomy 15 years ago and appendectomy in 1975, p/w 6 weeks of progressive fatigue, painless jaundice, malodorous loose stools, worsening glycemic control, weight loss, pruritus who was found to have a pancreatic head/uncinate neck mass with upstream pancreatic and biliary ductal dilatation with elevated tumor CA-19-9 and CEA concerning for pancreatic adenocarcinoma.      PLAN:    - S/p ERCP and stent, will f/u biopsy results  - T bili remains elevated, f/u GI recs for management  - No surgical intervention at this time, patient will need to be presented at [facility] to discuss neoadjuvant treatment  - will follow      E Team Surgery  [id number] 63M PMH HTN, T2DM (on insulin), hemorrhoid, hyperthyroidism, asthma, GERD, and RAMANA (not on CPAP), surgical history of open cholecystectomy 15 years ago and appendectomy in 1975, p/w 6 weeks of progressive fatigue, painless jaundice, malodorous loose stools, worsening glycemic control, weight loss, pruritus who was found to have a pancreatic head/uncinate neck mass with upstream pancreatic and biliary ductal dilatation with elevated tumor CA-19-9 and CEA concerning for pancreatic adenocarcinoma, bx confirms diagnosis.      PLAN:    - S/p ERCP and stent  - bx c/w pancreatic adenocarcinoma   - T bili remains elevated, f/u GI recs for management  - No surgical intervention at this time, patient will need to be presented at [facility] to discuss neoadjuvant treatment  - will follow      E Team Surgery  [id number]

## 2024-05-24 NOTE — PROGRESS NOTE ADULT - PROBLEM SELECTOR PLAN 6
- C/w atorvastatin 40 mg qHS as therapeutic interchange for rosuvastatin 10 mg qHS  - f/u lipid profile

## 2024-05-24 NOTE — PROGRESS NOTE ADULT - ASSESSMENT
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 Mass abuts the SMV and second portion of the duodenum. Mass also contacts the proximal SMA; extrahepatic/intrahepatic biliary dilation with CBD 2.2cm with abrupt narrowing to level of the pancreatic mass  - s/p ERCP 5/21/24: noted to have biliary stricture s /p sphincterotomy, brushing, and 10 mm x 60 mm covered metal stent in the CBD; irregular z-line biopsied; erythematous gastric mucosa biopsied.  - Path:  Biliary brushings: positive for adenocarcinoma  - Path: Pancreatic head/uncinate bx: positive for Invasive moderately-differentiated adenocarcinoma       Recommendations  - No additional intervention from [facility] indicated at this time  - Oncology evaluation  - Surgery following with plans for discussion at [facility]  - Diet as tolerated  - Daily CBC, CMP, coags  - Ensure adequate hydration  - Follow up with [facility] outpatient in 2-3 mos  - Please provide patient with Gastroenterology Clinic  for outpatient follow up with Dr. Maddie Goodwin; [phone] ([facility] at [address] [place] [address])    - Rest of care per primary     Preliminary note until signed by Attending.    Thank you for involving us in this patient's care. Please reach out if any further questions or concerns. Signing off.    Anastasia Lu MD  Gastroenterology/Hepatology Fellow, PGY-7    [facility] ROUTINE CONSULT 24/7: [email or web address] ROUTINE CONSULT 24/7: [email or web address]  For urgent consults during the weekends (all day) and weeknights (5PM to 7 AM) please:  1. Contact on call GI team via page followed by TEAMS Call if no response  2. If no response, call the answering service ([phone])     63M with history of HTN, HLD, type 2 DM (on insulin), hyperthyroidism, asthma, GERD, and RAMANA (not on CPAP) presents with 1 week of painless jaundice    Impression  #Pancreatic head/uncinate invasive moderately-differentiated adenocarcinoma  #Cholangiocarcinoma  #obstructive jaundice 2/2 pancreatic head/uncinate mass c/b biliary stricture  #weight loss; 25lbs over past 1 month  - afebrile; no leukocytosis; abd exam benign without tenderness of distension  - CT showing 3.5 x 4.5 x 3.0 cm heterogenous solid mass in the pancreatic head/uncinate process with upstream main PD dilatation. Mass abuts the SMV and second portion of the duodenum. Mass also contacts the proximal SMA; extrahepatic/intrahepatic biliary dilation with CBD 2.2cm with abrupt narrowing to level of the pancreatic mass  - s/p ERCP 5/21/24: noted to have biliary stricture s /p sphincterotomy, brushing, and 10 mm x 60 mm covered metal stent in the CBD; irregular z-line biopsied; erythematous gastric mucosa biopsied.  - Path:  Biliary brushings: positive for adenocarcinoma  - Path: Pancreatic head/uncinate bx: positive for Invasive moderately-differentiated adenocarcinoma       Recommendations  - Oncology evaluation  - Surgery following with plans for discussion at [facility]  - Diet as tolerated  - Daily CBC, CMP, coags  - Ensure adequate hydration  - Follow up with [facility] outpatient in 2-3 mos   - Rest of care per primary     Preliminary note until signed by Attending.    Anastasia Lu MD  Gastroenterology/Hepatology Fellow, PGY-7    [facility] ROUTINE CONSULT 24/7: [email or web address]  [place] ROUTINE CONSULT 24/7: [email or web address]  For urgent consults during the weekends (all day) and weeknights (5PM to 7 AM) please:  1. Contact on call GI team via page followed by TEAMS Call if no response  2. If no response, call the answering service ([phone])

## 2024-05-24 NOTE — PROVIDER CONTACT NOTE (HYPOGLYCEMIA EVENT) - NS PROVIDER CONTACT ASSESS-HYPO
Pt is asymptomatic
Pt A/O x 4, VSS, asymptomatic
patient remains A&ox4, no complaints of SOB or chest pain. Vital signs stable as documented in flow sheet

## 2024-05-24 NOTE — PROGRESS NOTE ADULT - PROBLEM SELECTOR PLAN 2
CTAP with IV contrast demonstrating a 3.5 x 4.5 x 3.0 cm heterogenous solid mass in the pancreatic head/uncinate process with upstream main pancreatic ductal dilatation as well as extrahepatic and intrahepatic biliary ductal dilatation, including CBD dilation to 2.2 cm. Suspicious for pancreatic malignancy.  Elevated CA 19-9 and CEA.   s/p EUS W/ biopsy    - surg onc consulted -> f/u biopsy results   - Plan as above for cholestatic jaundice  - will need outpatient onc eval CTAP with IV contrast demonstrating a 3.5 x 4.5 x 3.0 cm heterogenous solid mass in the pancreatic head/uncinate process with upstream main pancreatic ductal dilatation as well as extrahepatic and intrahepatic biliary ductal dilatation, including CBD dilation to 2.2 cm. Elevated CA 19-9 and CEA.   s/p EUS W/ biopsy - invasive moderately differentiated adenocarcinoma.     - surg onc consulted -> outpatient surg onc workup   - Plan as above for cholestatic jaundice  - outpatient onc f/u

## 2024-05-24 NOTE — PROGRESS NOTE ADULT - ASSESSMENT
64 yo man with history of HTN, HLD, type 2 DM (on insulin), hyperthyroidism, asthma, GERD, and RAMANA (not on CPAP) presents with 2 weeks of painless jaundice, found to have  a 3.5 x 4.5 x 3.0 cm heterogenous solid mass in the pancreatic head/uncinate process with extrahepatic and intrahepatic biliary dilatation. Endocrinology consulted for assistance with management of uncontrolled DM2 w/ hyperglycemia and hypoglycemia iso newly discovered pancreatic mass.   Patient is high risk with high level decision making due to uncontrolled diabetes with A1C>9, new pancreatic mass and SOFIE which places patient at high risk for cardiovascular and cerebrovascular events. Patient with lability of glucose requiring close monitoring and insulin adjustments.    #Uncontrolled DM2 w/ hypo& hyperglycemia   A1c 9%   newly found pancreatic mass   egfr 68 - SOFIE  higher basal/bolus insulin requirements at home but likely that po intake/diet is also much different   Recommendations:   - BG goal 100-180: below goal at bedtime yesterday   - Increase to Lantus 20 units SC QHS   - Decrease Admelog to 7 units SC Premeal/TIDAC - hold if npo or if eating < 50% of meals   - Continue Admelog Low Correction Scale Premeal & Separate LOW Correction Scale Bedtime - change to q6h if npo   - FS Blood glucose monitoring Premeal/Bedtime - change to q6h if npo   - Hypoglycemia protocol   - Carb Consistent Diet   - Nutrition consult   DC Planning: Basal/bolus insulin pens. Doses TBD closer to discharge pending insulin requirements/clinical course.  Please ensure patient has prescriptions for diabetes supplies (glucometer, test strips, lancets, alcohol swabs, insulin pen needles).   **Patient interested in CGM- Please send script for VBOX 3 sensors and reader (d/w primary team).  Routine outpatient opthalmology & podiatry evaluations recommended. Patient can follow up with endocrinology at the location provided below: D/w that he needs endocrinologist outpt and he expressed wanting to ask his PCP to recommend one but also wants our information in his discharge incase he wants to call to make an appointment.     [facility]   [address] [place] [id number] [phone]    #Hx of Hyperthyroidism  #Hx of Thyroid Nodule   - outpatient endocrine follow up  - not on Methimazole   - on atenolol   - TSH 5/17 wnl at 0.7  -Discharge on atenolol and follow up with PCP/endocrinologist to monitor TFT's and repeat them in 4-6 weeks.     #HTN  Recommendations:   - outpt BP goal < 130/80   - defer to primary team   - outpatient annual urinary microalb/cr ratio  - on atenolol     #HLD  Recommendations:   - LDL goal < 70   - defer to primary team   - outpatient fasting lipid profile   - on atorvastatin 40mg qhs     Metabolic Acidosis   noted on yesterday labs   Team attempting to get labs today ; pt is a very hard stick   would r/o causes for metabolic acidosis   trend labs   defer to primary team     D/w Dr. Ava Townsend  Nurse Practitioner  Division of Endocrinology & Diabetes  In house pager #[phone]    If before 9AM or after 6PM, or on weekends/holidays, please call endocrine answering service for assistance ([phone]).For nonurgent matters email [email or web address] for assistance.    62 yo man with history of HTN, HLD, type 2 DM (on insulin), hyperthyroidism, asthma, GERD, and RAMANA (not on CPAP) presents with 2 weeks of painless jaundice, found to have  a 3.5 x 4.5 x 3.0 cm heterogenous solid mass in the pancreatic head/uncinate process with extrahepatic and intrahepatic biliary dilatation. Endocrinology consulted for assistance with management of uncontrolled DM2 w/ hyperglycemia and hypoglycemia iso newly discovered pancreatic mass.   Patient is high risk with high level decision making due to uncontrolled diabetes with A1C>9, new pancreatic mass and SOFIE which places patient at high risk for cardiovascular and cerebrovascular events. Patient with lability of glucose requiring close monitoring and insulin adjustments.    #Uncontrolled DM2 w/ hypo& hyperglycemia   A1c 9%   newly found pancreatic mass   egfr 68 - SOFIE  higher basal/bolus insulin requirements at home but likely that po intake/diet is also much different   Recommendations:   - BG goal 100-180: below goal at bedtime yesterday   - Increase to Lantus 20 units SC QHS   - Decrease Admelog to 7 units SC Premeal/TIDAC - hold if npo or if eating < 50% of meals   - Continue Admelog Low Correction Scale Premeal & Separate LOW Correction Scale Bedtime - change to q6h if npo   - FS Blood glucose monitoring Premeal/Bedtime - change to q6h if npo   - Hypoglycemia protocol   - Carb Consistent Diet   - Nutrition consult   - Check c-peptide in am with bmp     DC Planning: Basal/bolus insulin pens. Doses TBD closer to discharge pending insulin requirements/clinical course.  Please ensure patient has prescriptions for diabetes supplies (glucometer, test strips, lancets, alcohol swabs, insulin pen needles).   **Patient interested in CGM- Please send script for Coupang 3 sensors and reader (d/w primary team).  Routine outpatient opthalmology & podiatry evaluations recommended. Patient can follow up with endocrinology at the location provided below: D/w that he needs endocrinologist outpt and he expressed wanting to ask his PCP to recommend one but also wants our information in his discharge incase he wants to call to make an appointment.     [facility]   [address] [place] [address]  [place] [address]  [phone]    #Hx of Hyperthyroidism  #Hx of Thyroid Nodule   - outpatient endocrine follow up  - not on Methimazole   - on atenolol   - TSH 5/17 wnl at 0.7  -Discharge on atenolol and follow up with PCP/endocrinologist to monitor TFT's and repeat them in 4-6 weeks.     #HTN  Recommendations:   - outpt BP goal < 130/80   - defer to primary team   - outpatient annual urinary microalb/cr ratio  - on atenolol     #HLD  Recommendations:   - LDL goal < 70   - defer to primary team   - outpatient fasting lipid profile   - on atorvastatin 40mg qhs     Metabolic Acidosis   noted on yesterday labs   BHB previously 0.0  received lantus as prescribed glucose wnl   Team attempting to get labs today ; pt is a very hard stick   would r/o causes for metabolic acidosis   would recommend checking vbg as well   trend labs   defer to primary team     D/w Dr. Ava Townsend  Nurse Practitioner  Division of Endocrinology & Diabetes  In house pager #[phone]    If before 9AM or after 6PM, or on weekends/holidays, please call endocrine answering service for assistance ([phone]).For nonurgent matters email [email or web address] for assistance.    62 yo man with history of HTN, HLD, type 2 DM (on insulin), hyperthyroidism, asthma, GERD, and RAMANA (not on CPAP) presents with 2 weeks of painless jaundice, found to have  a 3.5 x 4.5 x 3.0 cm heterogenous solid mass in the pancreatic head/uncinate process with extrahepatic and intrahepatic biliary dilatation. Endocrinology consulted for assistance with management of uncontrolled DM2 w/ hyperglycemia and hypoglycemia iso newly discovered pancreatic mass.   Patient is high risk with high level decision making due to uncontrolled diabetes with A1C>9, new pancreatic mass and SOFIE which places patient at high risk for cardiovascular and cerebrovascular events. Patient with lability of glucose requiring close monitoring and insulin adjustments.    #Uncontrolled DM2 w/ hypo& hyperglycemia   A1c 9%   newly found pancreatic mass   egfr 68 - SOFIE  higher basal/bolus insulin requirements at home but likely that po intake/diet is also much different   Recommendations:   - BG goal 100-180: below goal at bedtime yesterday   - Continue Lantus 20 units SC QHS   - Decrease Admelog to 7 units SC Premeal/TIDAC - hold if npo or if eating < 50% of meals   - Continue Admelog Low Correction Scale Premeal & Separate LOW Correction Scale Bedtime - change to q6h if npo   - FS Blood glucose monitoring Premeal/Bedtime - change to q6h if npo   - Hypoglycemia protocol   - Carb Consistent Diet   - Nutrition consult   - Check c-peptide in am with bmp     DC Planning: Basal/bolus insulin pens. Doses TBD closer to discharge pending insulin requirements/clinical course.  Please ensure patient has prescriptions for diabetes supplies (glucometer, test strips, lancets, alcohol swabs, insulin pen needles).   **Patient interested in CGM- Please send script for Xceedium 3 sensors and reader (d/w primary team).  Routine outpatient opthalmology & podiatry evaluations recommended. Patient can follow up with endocrinology at the location provided below: D/w that he needs endocrinologist outpt and he expressed wanting to ask his PCP to recommend one but also wants our information in his discharge incase he wants to call to make an appointment.     [facility]   [address] [place] [address]  [phone]    #Hx of Hyperthyroidism  #Hx of Thyroid Nodule   - outpatient endocrine follow up  - not on Methimazole   - on atenolol   - TSH 5/17 wnl at 0.7  -Discharge on atenolol and follow up with PCP/endocrinologist to monitor TFT's and repeat them in 4-6 weeks.     #HTN  Recommendations:   - outpt BP goal < 130/80   - defer to primary team   - outpatient annual urinary microalb/cr ratio  - on atenolol     #HLD  Recommendations:   - LDL goal < 70   - defer to primary team   - outpatient fasting lipid profile   - on atorvastatin 40mg qhs     Metabolic Acidosis   noted on yesterday labs   BHB previously 0.0  received lantus as prescribed glucose wnl   Team attempting to get labs today ; pt is a very hard stick   would r/o causes for metabolic acidosis   would recommend checking vbg and lactate as well : 5/22 lactate 2.5 above goal    trend labs   defer to primary team     D/w Dr. Ava Helm Resident     Loretta Townsend  Nurse Practitioner  Division of Endocrinology & Diabetes  In house pager #[phone]    If before 9AM or after 6PM, or on weekends/holidays, please call endocrine answering service for assistance ([phone]).For nonurgent matters email [email or web address] for assistance.

## 2024-05-24 NOTE — PROGRESS NOTE ADULT - SUBJECTIVE AND OBJECTIVE BOX
Chief Complaint: #Uncontrolled DM2 w/ hypo& hyperglycemia     History: Pt seen at bedside. Pt tolerating oral diet. Pt denies nausea and vomiting/any signs of hypoglycemia. Pt reports an adequate appetite.     MEDICATIONS  (STANDING):  atenolol  Tablet 50 milliGRAM(s) Oral daily  atorvastatin 40 milliGRAM(s) Oral at bedtime  cholestyramine Powder (Sugar-Free) 4 Gram(s) Oral daily  dextrose 10% Bolus 125 milliLiter(s) IV Bolus once  dextrose 5%. 1000 milliLiter(s) (50 mL/Hr) IV Continuous <Continuous>  dextrose 5%. 1000 milliLiter(s) (100 mL/Hr) IV Continuous <Continuous>  dextrose 50% Injectable 12.5 Gram(s) IV Push once  dextrose 50% Injectable 25 Gram(s) IV Push once  famotidine    Tablet 40 milliGRAM(s) Oral daily  glucagon  Injectable 1 milliGRAM(s) IntraMuscular once  heparin   Injectable 5000 Unit(s) SubCutaneous every 8 hours  insulin glargine Injectable (LANTUS) 20 Unit(s) SubCutaneous at bedtime  insulin lispro (ADMELOG) corrective regimen sliding scale   SubCutaneous at bedtime  insulin lispro (ADMELOG) corrective regimen sliding scale   SubCutaneous three times a day before meals  insulin lispro Injectable (ADMELOG) 7 Unit(s) SubCutaneous three times a day before meals  lidocaine   4% Patch 1 Patch Transdermal daily  polyethylene glycol 3350 17 Gram(s) Oral daily  senna 2 Tablet(s) Oral at bedtime  sodium chloride 0.9%. 1000 milliLiter(s) (75 mL/Hr) IV Continuous <Continuous>  ursodiol Tablet 250 milliGRAM(s) Oral two times a day    MEDICATIONS  (PRN):  acetaminophen     Tablet .. 650 milliGRAM(s) Oral every 6 hours PRN Temp greater or equal to 38C (100.4F), Mild Pain (1 - 3), Moderate Pain (4 - 6), Severe Pain (7 - 10)  albuterol/ipratropium for Nebulization 3 milliLiter(s) Nebulizer every 6 hours PRN Shortness of Breath and/or Wheezing  dextrose Oral Gel 15 Gram(s) Oral once PRN Blood Glucose LESS THAN 70 milliGRAM(s)/deciliter  hydrOXYzine hydrochloride 25 milliGRAM(s) Oral every 8 hours PRN Itching  petrolatum white Ointment 1 Application(s) Topical every 6 hours PRN Dry lips      Allergies: No Known Allergies      Review of Systems:  Respiratory: No SOB, no cough  GI: No nausea, vomiting, abdominal pain  Endocrine: no polyuria, polydipsia    PHYSICAL EXAM:  VITALS: T(C): 36.4 (05-24-24 @ 13:05)  T(F): 97.6 (05-24-24 @ 13:05), Max: 97.6 (05-23-24 @ 22:17)  HR: 57 (05-24-24 @ 13:05) (55 - 65)  BP: 110/51 (05-24-24 @ 13:05) (106/62 - 116/65)  RR:  (17 - 18)  SpO2:  (98% - 100%)  Wt(kg): --  GENERAL: NAD, well-groomed, well-developed  RESPIRATORY: No labored breathing   GI: Soft, nontender, non distended  PSYCH: Alert and oriented x 3, normal affect, normal mood      CAPILLARY BLOOD GLUCOSE  POCT Blood Glucose.: 112 mg/dL (24 May 2024 12:19)  POCT Blood Glucose.: 104 mg/dL (24 May 2024 08:50)  POCT Blood Glucose.: 88 mg/dL (23 May 2024 22:05)  POCT Blood Glucose.: 189 mg/dL (23 May 2024 17:49)    A1C with Estimated Average Glucose (05.18.24 @ 06:35)    A1C with Estimated Average Glucose Result: 9.0   Estimated Average Glucose: 212      05-23    126<L>  |  95<L>  |  61<H>  ----------------------------<  215<H>  5.1   |  16<L>  |  2.91<H>    eGFR: 23<L>    Ca    8.1<L>      05-23  Mg     2.20     05-23  Phos  3.6     05-23    TPro  6.9  /  Alb  2.6<L>  /  TBili  24.5<H>  /  DBili  x   /  AST  131<H>  /  ALT  41  /  AlkPhos  482<H>  05-23      Thyroid Function Tests:  05-17 @ 16:22 TSH 0.70 FreeT4 -- T3 -- Anti TPO -- Anti Thyroglobulin Ab -- TSI --      Diet, DASH/TLC:   Sodium & Cholesterol Restricted  Consistent Carbohydrate No Snacks (CSTCHO)  No Pork (05-22-24 @ 11:49) [Active]

## 2024-05-24 NOTE — PROGRESS NOTE ADULT - SUBJECTIVE AND OBJECTIVE BOX
Surgery Progress Note    S: Patient seen and examined. No acute events overnight. Tolerating diet without N/V. Denies abdominal pain.    O:  Physical Exam:  Gen: Laying in bed, NAD  HEENT: atraumatic  Resp: Unlabored breathing, RA  Abd: soft, nontender, nondistended  Ext: Moves 4 extremities spontaneously        T(C): 36.4 (05-24-24 @ 04:35), Max: 36.4 (05-23-24 @ 22:17)  HR: 57 (05-24-24 @ 05:49) (55 - 65)  BP: 106/62 (05-24-24 @ 05:49) (106/62 - 116/65)  RR: 17 (05-24-24 @ 04:35) (17 - 17)  SpO2: 100% (05-24-24 @ 04:35) (98% - 100%)  Wt(kg): --    05-23 @ 07:01  -  05-24 @ 07:00  --------------------------------------------------------  IN:    Oral Fluid: 560 mL    sodium chloride 0.9%: 375 mL  Total IN: 935 mL    OUT:    Voided (mL): 850 mL  Total OUT: 850 mL    Total NET: 85 mL                            13.2   9.50  )-----------( 191      ( 23 May 2024 16:58 )             36.9     05-23    126<L>  |  95<L>  |  61<H>  ----------------------------<  215<H>  5.1   |  16<L>  |  2.91<H>    Ca    8.1<L>      23 May 2024 16:58  Phos  3.6     05-23  Mg     2.20     05-23    TPro  6.9  /  Alb  2.6<L>  /  TBili  24.5<H>  /  DBili  x   /  AST  131<H>  /  ALT  41  /  AlkPhos  482<H>  05-23    LIVER FUNCTIONS - ( 23 May 2024 16:58 )  Alb: 2.6 g/dL / Pro: 6.9 g/dL / ALK PHOS: 482 U/L / ALT: 41 U/L / AST: 131 U/L / GGT: x           PT/INR - ( 23 May 2024 07:15 )   PT: 17.2 sec;   INR: 1.56 ratio         PTT - ( 23 May 2024 07:15 )  PTT:45.2 sec  Urinalysis Basic - ( 23 May 2024 16:58 )    Color: x / Appearance: x / SG: x / pH: x  Gluc: 215 mg/dL / Ketone: x  / Bili: x / Urobili: x   Blood: x / Protein: x / Nitrite: x   Leuk Esterase: x / RBC: x / WBC x   Sq Epi: x / Non Sq Epi: x / Bacteria: x     Surgery Progress Note    S: Patient seen and examined. No acute events overnight. Tolerating diet without N/V. Denies abdominal pain.    O:  Physical Exam:  Gen: Laying in bed, NAD  HEENT: atraumatic  Resp: Unlabored breathing, RA  Abd: soft, nontender, nondistended  Ext: Moves 4 extremities spontaneously        T(C): 36.4 (05-24-24 @ 04:35), Max: 36.4 (05-23-24 @ 22:17)  HR: 57 (05-24-24 @ 05:49) (55 - 65)  BP: 106/62 (05-24-24 @ 05:49) (106/62 - 116/65)  RR: 17 (05-24-24 @ 04:35) (17 - 17)  SpO2: 100% (05-24-24 @ 04:35) (98% - 100%)  Wt(kg): --    05-23 @ 07:01  -  05-24 @ 07:00  --------------------------------------------------------  IN:    Oral Fluid: 560 mL    sodium chloride 0.9%: 375 mL  Total IN: 935 mL    OUT:    Voided (mL): 850 mL  Total OUT: 850 mL    Total NET: 85 mL                            13.2   9.50  )-----------( 191      ( 23 May 2024 16:58 )             36.9     05-23    126<L>  |  95<L>  |  61<H>  ----------------------------<  215<H>  5.1   |  16<L>  |  2.91<H>    Ca    8.1<L>      23 May 2024 16:58  Phos  3.6     05-23  Mg     2.20     05-23    TPro  6.9  /  Alb  2.6<L>  /  TBili  24.5<H>  /  DBili  x   /  AST  131<H>  /  ALT  41  /  AlkPhos  482<H>  05-23    LIVER FUNCTIONS - ( 23 May 2024 16:58 )  Alb: 2.6 g/dL / Pro: 6.9 g/dL / ALK PHOS: 482 U/L / ALT: 41 U/L / AST: 131 U/L / GGT: x           PT/INR - ( 23 May 2024 07:15 )   PT: 17.2 sec;   INR: 1.56 ratio         PTT - ( 23 May 2024 07:15 )  PTT:45.2 sec  Urinalysis Basic - ( 23 May 2024 16:58 )    Color: x / Appearance: x / SG: x / pH: x  Gluc: 215 mg/dL / Ketone: x  / Bili: x / Urobili: x   Blood: x / Protein: x / Nitrite: x   Leuk Esterase: x / RBC: x / WBC x   Sq Epi: x / Non Sq Epi: x / Bacteria: x      Final Diagnosis   1. Stomach, biopsy   - Gastric antral and oxyntic mucosa with no significant diagnostic   alterations   - No morphologic evidence of Helicobacter microorganisms   2. GE junction, biopsy   - Mild chronic nonspecific esophagitis   - Negative for intraepithelial eosinophilia   - Focal detached glandular epithelium and submucosal glands present,   see note   3. Pancreatic head and uncinate, fine needle biopsy   - Invasive moderately-differentiated adenocarcinoma   Part 2: Deeper levels are in progress for further evaluation; an addendum   to follow.

## 2024-05-24 NOTE — PROGRESS NOTE ADULT - PROBLEM SELECTOR PLAN 8
Pt with serum HCO3 19 with AG 18 on admission. On VBG, pH 7.35 and lactate 3.1. Possibly from type B lactic acidosis in setting of pancreatic malignancy. Unlikely to be DKA, as BHB negative.     - continue to monitor

## 2024-05-24 NOTE — PROGRESS NOTE ADULT - SUBJECTIVE AND OBJECTIVE BOX
*******************************  Ava Helm MD (PGY-1)  Internal Medicine  Contact via [employer] TEAMS  *******************************    ADA COX  63y  Male    Patient is a 63y old  Male who presents with a chief complaint of Jaundice, pancreatic mass (23 May 2024 13:21)      Subjective:    Objective:  T(C): 36.4 (05-24-24 @ 04:35), Max: 36.4 (05-23-24 @ 22:17)  HR: 57 (05-24-24 @ 05:49) (55 - 65)  BP: 106/62 (05-24-24 @ 05:49) (106/62 - 116/65)  RR: 17 (05-24-24 @ 04:35) (17 - 17)  SpO2: 100% (05-24-24 @ 04:35) (98% - 100%)  I&O's Summary    23 May 2024 07:01  -  24 May 2024 07:00  --------------------------------------------------------  IN: 935 mL / OUT: 850 mL / NET: 85 mL        PHYSICAL EXAM:  GENERAL: NAD  HEAD:  Atraumatic, Normocephalic  EYES: EOMI, PERRLA, conjunctiva and sclera clear  ENMT: Moist mucous membranes  NECK: Supple, No JVD, trachea midline   NERVOUS SYSTEM:  Alert & Oriented X3, Good concentration; Motor Strength 5/5 B/L upper and lower extremities; DTRs 2+ intact and symmetric  CHEST/LUNG: Clear to auscultation bilaterally; No rales, rhonchi, wheezing, or rubs  HEART: Regular rate and rhythm; No murmurs, rubs, or gallops  ABDOMEN: Soft, Nontender, Nondistended; Bowel sounds present  EXTREMITIES:  2+ Peripheral Pulses, No clubbing, cyanosis, or edema  LYMPH: No lymphadenopathy noted  SKIN: No rashes or lesions    MEDICATIONS  (STANDING):  atenolol  Tablet 50 milliGRAM(s) Oral daily  atorvastatin 40 milliGRAM(s) Oral at bedtime  cholestyramine Powder (Sugar-Free) 4 Gram(s) Oral daily  dextrose 10% Bolus 125 milliLiter(s) IV Bolus once  dextrose 5%. 1000 milliLiter(s) (50 mL/Hr) IV Continuous <Continuous>  dextrose 5%. 1000 milliLiter(s) (100 mL/Hr) IV Continuous <Continuous>  dextrose 50% Injectable 12.5 Gram(s) IV Push once  dextrose 50% Injectable 25 Gram(s) IV Push once  famotidine    Tablet 40 milliGRAM(s) Oral daily  glucagon  Injectable 1 milliGRAM(s) IntraMuscular once  heparin   Injectable 5000 Unit(s) SubCutaneous every 8 hours  insulin glargine Injectable (LANTUS) 20 Unit(s) SubCutaneous at bedtime  insulin lispro (ADMELOG) corrective regimen sliding scale   SubCutaneous at bedtime  insulin lispro (ADMELOG) corrective regimen sliding scale   SubCutaneous three times a day before meals  insulin lispro Injectable (ADMELOG) 8 Unit(s) SubCutaneous three times a day before meals  polyethylene glycol 3350 17 Gram(s) Oral daily  senna 2 Tablet(s) Oral at bedtime  sodium chloride 0.9%. 1000 milliLiter(s) (75 mL/Hr) IV Continuous <Continuous>  ursodiol Tablet 250 milliGRAM(s) Oral two times a day    MEDICATIONS  (PRN):  acetaminophen     Tablet .. 650 milliGRAM(s) Oral every 6 hours PRN Temp greater or equal to 38C (100.4F), Mild Pain (1 - 3), Moderate Pain (4 - 6), Severe Pain (7 - 10)  albuterol/ipratropium for Nebulization 3 milliLiter(s) Nebulizer every 6 hours PRN Shortness of Breath and/or Wheezing  dextrose Oral Gel 15 Gram(s) Oral once PRN Blood Glucose LESS THAN 70 milliGRAM(s)/deciliter  hydrOXYzine hydrochloride 25 milliGRAM(s) Oral every 8 hours PRN Itching  petrolatum white Ointment 1 Application(s) Topical every 6 hours PRN Dry lips      LABS:        CAPILLARY BLOOD GLUCOSE      POCT Blood Glucose.: 88 mg/dL (23 May 2024 22:05)  POCT Blood Glucose.: 189 mg/dL (23 May 2024 17:49)  POCT Blood Glucose.: 212 mg/dL (23 May 2024 12:19)  POCT Blood Glucose.: 265 mg/dL (23 May 2024 08:50)      RADIOLOGY & ADDITIONAL TESTS:               *******************************  Ava Helm MD (PGY-1)  Internal Medicine  Contact via [employer] TEAMS  *******************************    ADA COX  63y  Male    Patient is a 63y old  Male who presents with a chief complaint of Jaundice, pancreatic mass (23 May 2024 13:21)    Subjective: No acute events overnight. Patient reported urine was previously dark, now clear yellow. Denied any fever, chills, chest pain, sob, ab pain, n/v.     Objective:  T(C): 36.4 (05-24-24 @ 04:35), Max: 36.4 (05-23-24 @ 22:17)  HR: 57 (05-24-24 @ 05:49) (55 - 65)  BP: 106/62 (05-24-24 @ 05:49) (106/62 - 116/65)  RR: 17 (05-24-24 @ 04:35) (17 - 17)  SpO2: 100% (05-24-24 @ 04:35) (98% - 100%)  I&O's Summary    23 May 2024 07:01  -  24 May 2024 07:00  --------------------------------------------------------  IN: 935 mL / OUT: 850 mL / NET: 85 mL    PHYSICAL EXAM:  GENERAL: NAD  HEAD:  Atraumatic, Normocephalic  EYES: EOMI, PERRLA, scleral icterus  ENMT: Moist mucous membranes  NECK: Supple, No JVD, trachea midline   NERVOUS SYSTEM:  Alert & Oriented X3, Good concentration  CHEST/LUNG: Clear to auscultation bilaterally; No rales, rhonchi, wheezing, or rubs  HEART: Regular rate and rhythm; No murmurs, rubs, or gallops  ABDOMEN: Soft, Nontender, Nondistended; Bowel sounds present  EXTREMITIES:  2+ Peripheral Pulses, No clubbing, cyanosis, or edema  SKIN: jaundice    MEDICATIONS  (STANDING):  atenolol  Tablet 50 milliGRAM(s) Oral daily  atorvastatin 40 milliGRAM(s) Oral at bedtime  cholestyramine Powder (Sugar-Free) 4 Gram(s) Oral daily  dextrose 10% Bolus 125 milliLiter(s) IV Bolus once  dextrose 5%. 1000 milliLiter(s) (50 mL/Hr) IV Continuous <Continuous>  dextrose 5%. 1000 milliLiter(s) (100 mL/Hr) IV Continuous <Continuous>  dextrose 50% Injectable 12.5 Gram(s) IV Push once  dextrose 50% Injectable 25 Gram(s) IV Push once  famotidine    Tablet 40 milliGRAM(s) Oral daily  glucagon  Injectable 1 milliGRAM(s) IntraMuscular once  heparin   Injectable 5000 Unit(s) SubCutaneous every 8 hours  insulin glargine Injectable (LANTUS) 20 Unit(s) SubCutaneous at bedtime  insulin lispro (ADMELOG) corrective regimen sliding scale   SubCutaneous at bedtime  insulin lispro (ADMELOG) corrective regimen sliding scale   SubCutaneous three times a day before meals  insulin lispro Injectable (ADMELOG) 8 Unit(s) SubCutaneous three times a day before meals  polyethylene glycol 3350 17 Gram(s) Oral daily  senna 2 Tablet(s) Oral at bedtime  sodium chloride 0.9%. 1000 milliLiter(s) (75 mL/Hr) IV Continuous <Continuous>  ursodiol Tablet 250 milliGRAM(s) Oral two times a day    MEDICATIONS  (PRN):  acetaminophen     Tablet .. 650 milliGRAM(s) Oral every 6 hours PRN Temp greater or equal to 38C (100.4F), Mild Pain (1 - 3), Moderate Pain (4 - 6), Severe Pain (7 - 10)  albuterol/ipratropium for Nebulization 3 milliLiter(s) Nebulizer every 6 hours PRN Shortness of Breath and/or Wheezing  dextrose Oral Gel 15 Gram(s) Oral once PRN Blood Glucose LESS THAN 70 milliGRAM(s)/deciliter  hydrOXYzine hydrochloride 25 milliGRAM(s) Oral every 8 hours PRN Itching  petrolatum white Ointment 1 Application(s) Topical every 6 hours PRN Dry lips    LABS:  pending AM labs     CAPILLARY BLOOD GLUCOSE  POCT Blood Glucose.: 88 mg/dL (23 May 2024 22:05)  POCT Blood Glucose.: 189 mg/dL (23 May 2024 17:49)  POCT Blood Glucose.: 212 mg/dL (23 May 2024 12:19)  POCT Blood Glucose.: 265 mg/dL (23 May 2024 08:50)    RADIOLOGY & ADDITIONAL TESTS:

## 2024-05-24 NOTE — PROGRESS NOTE ADULT - SUBJECTIVE AND OBJECTIVE BOX
Chief Complaint:  Patient is a 63y old  Male who presents with a chief complaint of Jaundice, pancreatic mass (24 May 2024 12:50)       Interval Events:   Afebrile and stable  Bili 5/23 evening 24.5 from 26.8  Biliary brushings: positive for adenocarcinoma  Pancreatic head/uncinate bx: positive for Invasive moderately-differentiated adenocarcinoma   Diagnosis reviewed with patient; expresses understanding and states he thought it would be cancer so was preparing himself for it and is anticipating the next steps.      Hospital Medications:  acetaminophen     Tablet .. 650 milliGRAM(s) Oral every 6 hours PRN  albuterol/ipratropium for Nebulization 3 milliLiter(s) Nebulizer every 6 hours PRN  atenolol  Tablet 50 milliGRAM(s) Oral daily  atorvastatin 40 milliGRAM(s) Oral at bedtime  cholestyramine Powder (Sugar-Free) 4 Gram(s) Oral daily  dextrose 10% Bolus 125 milliLiter(s) IV Bolus once  dextrose 5%. 1000 milliLiter(s) IV Continuous <Continuous>  dextrose 5%. 1000 milliLiter(s) IV Continuous <Continuous>  dextrose 50% Injectable 25 Gram(s) IV Push once  dextrose 50% Injectable 12.5 Gram(s) IV Push once  dextrose Oral Gel 15 Gram(s) Oral once PRN  famotidine    Tablet 40 milliGRAM(s) Oral daily  glucagon  Injectable 1 milliGRAM(s) IntraMuscular once  heparin   Injectable 5000 Unit(s) SubCutaneous every 8 hours  hydrOXYzine hydrochloride 25 milliGRAM(s) Oral every 8 hours PRN  insulin glargine Injectable (LANTUS) 20 Unit(s) SubCutaneous at bedtime  insulin lispro (ADMELOG) corrective regimen sliding scale   SubCutaneous at bedtime  insulin lispro (ADMELOG) corrective regimen sliding scale   SubCutaneous three times a day before meals  insulin lispro Injectable (ADMELOG) 7 Unit(s) SubCutaneous three times a day before meals  petrolatum white Ointment 1 Application(s) Topical every 6 hours PRN  polyethylene glycol 3350 17 Gram(s) Oral daily  senna 2 Tablet(s) Oral at bedtime  sodium chloride 0.9%. 1000 milliLiter(s) IV Continuous <Continuous>  ursodiol Tablet 250 milliGRAM(s) Oral two times a day      ROS:   Complete and normal except as mentioned above.    PHYSICAL EXAM:   Vital Signs:  Vital Signs Last 24 Hrs  T(C): 36.4 (24 May 2024 13:05), Max: 36.4 (23 May 2024 22:17)  T(F): 97.6 (24 May 2024 13:05), Max: 97.6 (23 May 2024 22:17)  HR: 57 (24 May 2024 13:05) (55 - 65)  BP: 110/51 (24 May 2024 13:05) (106/62 - 116/65)  BP(mean): --  RR: 18 (24 May 2024 13:05) (17 - 18)  SpO2: 100% (24 May 2024 13:05) (98% - 100%)    Parameters below as of 24 May 2024 13:05  Patient On (Oxygen Delivery Method): room air      Daily     Daily     GENERAL: no acute distress  NEURO: aox3  HEENT: scleral icterus  CHEST: no respiratory distress, no accessory muscle use  CARDIAC: regular rate  ABDOMEN: soft, non-tender, non-distended, no rebound or guarding  EXTREMITIES: warm, well perfused,  SKIN: jaundice    LABS:                          13.2   9.50  )-----------( 191      ( 23 May 2024 16:58 )             36.9     05-23    126<L>  |  95<L>  |  61<H>  ----------------------------<  215<H>  5.1   |  16<L>  |  2.91<H>    Ca    8.1<L>      23 May 2024 16:58  Phos  3.6     05-23  Mg     2.20     05-23    TPro  6.9  /  Alb  2.6<L>  /  TBili  24.5<H>  /  DBili  x   /  AST  131<H>  /  ALT  41  /  AlkPhos  482<H>  05-23    LIVER FUNCTIONS - ( 23 May 2024 16:58 )  Alb: 2.6 g/dL / Pro: 6.9 g/dL / ALK PHOS: 482 U/L / ALT: 41 U/L / AST: 131 U/L / GGT: x             Interval Diagnostic Studies:   Collected Date/Time: 5/21/2024 14:39 EDT   Received Date/Time: 5/22/2024 14:39 EDT   Surgical Pathology Report - Auth (Verified)   Specimen(s) Submitted   1-Gastric, bx   2-GE junction biopsy   3-Pancreatic head and uncinate fine needle bx   Final Diagnosis   1. Stomach, biopsy   - Gastric antral and oxyntic mucosa with no significant diagnostic   alterations   - No morphologic evidence of Helicobacter microorganisms   2. GE junction, biopsy   - Mild chronic nonspecific esophagitis   - Negative for intraepithelial eosinophilia   - Focal detached glandular epithelium and submucosal glands present,   see note   3. Pancreatic head and uncinate, fine needle biopsy   - Invasive moderately-differentiated adenocarcinoma

## 2024-05-24 NOTE — PROVIDER CONTACT NOTE (HYPOGLYCEMIA EVENT) - NS PROVIDER CONTACT BACKGROUND-HYPO
Age: 63y    Gender: Male    POCT Blood Glucose:  127 mg/dL (05-20-24 @ 05:55)  56 mg/dL (05-20-24 @ 05:32)  55 mg/dL (05-20-24 @ 05:27)  100 mg/dL (05-20-24 @ 00:30)  141 mg/dL (05-19-24 @ 22:17)  120 mg/dL (05-19-24 @ 18:19)  115 mg/dL (05-19-24 @ 13:11)  93 mg/dL (05-19-24 @ 09:06)      eMAR:atorvastatin   40 milliGRAM(s) Oral (05-19-24 @ 22:24)    cholestyramine Powder (Sugar-Free)   4 Gram(s) Oral (05-19-24 @ 09:07)    dextrose 50% Injectable   12.5 Gram(s) IV Push (05-20-24 @ 05:35)    insulin glargine Injectable (LANTUS)   20 Unit(s) SubCutaneous (05-19-24 @ 22:24)    
Age: 63y    Gender: Male    POCT Blood Glucose:  137 mg/dL (05-24-24 @ 23:56)  145 mg/dL (05-24-24 @ 22:39)  49 mg/dL (05-24-24 @ 22:06)  45 mg/dL (05-24-24 @ 22:05)  49 mg/dL (05-24-24 @ 21:56)  48 mg/dL (05-24-24 @ 21:54)  115 mg/dL (05-24-24 @ 17:59)  112 mg/dL (05-24-24 @ 12:19)      eMAR:  cholestyramine Powder (Sugar-Free)   4 Gram(s) Oral (05-24-24 @ 09:08)    dextrose 50% Injectable   25 Gram(s) IV Push (05-24-24 @ 22:17)    insulin glargine Injectable (LANTUS)   10 Unit(s) SubCutaneous (05-24-24 @ 23:57)    insulin lispro (ADMELOG) corrective regimen sliding scale   0 Unit(s) SubCutaneous (05-24-24 @ 18:09)   0 Unit(s) SubCutaneous (05-24-24 @ 12:57)   0 Unit(s) SubCutaneous (05-24-24 @ 09:09)    insulin lispro Injectable (ADMELOG)   8 Unit(s) SubCutaneous (05-24-24 @ 09:09)    insulin lispro Injectable (ADMELOG)   7 Unit(s) SubCutaneous (05-24-24 @ 18:09)   7 Unit(s) SubCutaneous (05-24-24 @ 12:57)    
Age: 63y    Gender: Male    POCT Blood Glucose:  63 mg/dL (05-21-24 @ 11:27)  64 mg/dL (05-21-24 @ 11:26)        eMAR:atorvastatin   40 milliGRAM(s) Oral (05-20-24 @ 21:58)    cholestyramine Powder (Sugar-Free)   4 Gram(s) Oral (05-21-24 @ 09:44)    dextrose 50% Injectable   25 Gram(s) IV Push (05-21-24 @ 11:52)    insulin glargine Injectable (LANTUS)   10 Unit(s) SubCutaneous (05-20-24 @ 23:42)

## 2024-05-24 NOTE — PROGRESS NOTE ADULT - PROBLEM SELECTOR PLAN 7
Pt states that he now takes Lantus 50 u qHS and Admelog 50 u qAM before breakfast at home.   A1c 9%. Episodes of hypoglycemia while NPO    - appreciate endo recs  - increase lantus to 20 units at bedtime, admelog 7 units premeal   - low dose insulin sliding scale premeal and bedtime  - hypoglycemia protocol Pt states that he now takes Lantus 50 u qHS and Admelog 50 u qAM before breakfast at home.   A1c 9%. Episodes of hypoglycemia while NPO    - appreciate endo recs  - continue lantus to 20 units at bedtime, admelog 7 units premeal   - low dose insulin sliding scale premeal and bedtime  - hypoglycemia protocol  - freestyle hung 3 sent to pharm

## 2024-05-24 NOTE — PROGRESS NOTE ADULT - ASSESSMENT
64 yo man with history of HTN, HLD, type 2 DM (on insulin), hyperthyroidism, asthma, GERD, and RAMANA (not on CPAP) presents with 2 weeks of painless jaundice, found to have  a 3.5 x 4.5 x 3.0 cm heterogenous solid mass in the pancreatic head/uncinate process with extrahepatic and intrahepatic biliary dilatation.  64 yo man with history of HTN, HLD, type 2 DM (on insulin), hyperthyroidism, asthma, GERD, and RAMANA (not on CPAP) presents with 2 weeks of painless jaundice, found to have  a 3.5 x 4.5 x 3.0 cm heterogenous solid mass in the pancreatic head/uncinate process with extrahepatic and intrahepatic biliary dilatation now s/p ERCP w/ stent placement and EUS w/ biopsy showing invasive moderately-differentiated adenocarcinoma.

## 2024-05-24 NOTE — PROGRESS NOTE ADULT - PROBLEM SELECTOR PLAN 3
Cr 0.86 on admission ->2.82. Fena 0.3% prerenal, BUN 19. Suspect prerenal in setting of dehydration vs intrinsic- NELSY?(contrast received 5/17 so past the 24-48 hrs).     - mIVF 75cc/hr  - U/A   - u/s kidney and bladder  - repeat BMP in PM  - monitor urine output  - avoid nephrotoxic agents  - renally dose meds Cr 0.86 on admission ->2.82. Fena 0.3% prerenal, BUN 19. Suspect prerenal in setting of dehydration vs intrinsic- NELSY?(contrast received 5/17 so past the 24-48 hrs).   U/S negative for hydronephrosis, findings suggestive of CKD.     - f/u AM BMP   - monitor urine output  - avoid nephrotoxic agents  - renally dose meds

## 2024-05-24 NOTE — PROGRESS NOTE ADULT - PROBLEM SELECTOR PLAN 1
Pt with 2 weeks of painless jaundice, associated with 25-lb weight loss, foamy dark urine, and changes to his bowel habits and stool caliber for the past 1 month. LFTs significant for t bili 29.5, direct bili >20, alk phos 532, AST 71, and ALT 38, consistent with cholestasis in setting of pancreatic mass causing extrahepatic and intrahepatic biliary ductal dilatation, including CBD dilation to 2.2 cm.  s/p ERCP w/ stent placement, still w/ continued elevation of T bili, ALP, AST,ALT    - daily LFTs  - Low threshold to start antibiotics if pt spikes fever, has worsening leukocytosis, or increasing hyperbilirubinemia due to concern for cholangitis given CBD dilatation   - continue Atarax 25 mg q8hrs PRN for itching and cholestyramine 4 g daily, urosodiol 250mg bid   - C/w famotidine 40 mg daily Pt with 2 weeks of painless jaundice, associated with 25-lb weight loss, foamy dark urine, and changes to his bowel habits and stool caliber for the past 1 month. LFTs significant for t bili 29.5, direct bili >20, alk phos 532, AST 71, and ALT 38, consistent with cholestasis in setting of pancreatic mass causing extrahepatic and intrahepatic biliary ductal dilatation, including CBD dilation to 2.2 cm.  s/p ERCP w/ stent placement on 5/21     - daily LFTs, consider CT A/P per GI if continued uptrend/elevated Tbili  - Low threshold to start antibiotics if pt spikes fever, has worsening leukocytosis, or increasing hyperbilirubinemia due to concern for cholangitis given CBD dilatation   - continue Atarax 25 mg q8hrs PRN for itching and cholestyramine 4 g daily, urosodiol 250mg bid   - C/w famotidine 40 mg daily

## 2024-05-24 NOTE — PROGRESS NOTE ADULT - PROBLEM SELECTOR PLAN 4
WBC 10.83 on admission. Pt afebrile, did not meet SIRS criteria. Possibly from malignancy.   Bump in WBC from 7.47-> 13.52.     - daily cbc  - monitor fever curve WBC 10.83 on admission. Pt afebrile, did not meet SIRS criteria. Possibly from malignancy.   Bump in WBC from 7.47-> 13.52-> 9.5    Resolved    - daily cbc  - monitor fever curve

## 2024-05-25 LAB
GLUCOSE BLDC GLUCOMTR-MCNC: 113 MG/DL — HIGH (ref 70–99)
GLUCOSE BLDC GLUCOMTR-MCNC: 129 MG/DL — HIGH (ref 70–99)
GLUCOSE BLDC GLUCOMTR-MCNC: 136 MG/DL — HIGH (ref 70–99)
GLUCOSE BLDC GLUCOMTR-MCNC: 68 MG/DL — LOW (ref 70–99)
GLUCOSE BLDC GLUCOMTR-MCNC: 74 MG/DL — SIGNIFICANT CHANGE UP (ref 70–99)
GLUCOSE BLDC GLUCOMTR-MCNC: 74 MG/DL — SIGNIFICANT CHANGE UP (ref 70–99)

## 2024-05-25 PROCEDURE — 99232 SBSQ HOSP IP/OBS MODERATE 35: CPT

## 2024-05-25 PROCEDURE — 99233 SBSQ HOSP IP/OBS HIGH 50: CPT | Mod: GC

## 2024-05-25 RX ORDER — INSULIN GLARGINE 100 [IU]/ML
20 INJECTION, SOLUTION SUBCUTANEOUS AT BEDTIME
Refills: 0 | Status: DISCONTINUED | OUTPATIENT
Start: 2024-05-25 | End: 2024-05-25

## 2024-05-25 RX ORDER — INSULIN LISPRO 100/ML
3 VIAL (ML) SUBCUTANEOUS
Refills: 0 | Status: DISCONTINUED | OUTPATIENT
Start: 2024-05-25 | End: 2024-05-25

## 2024-05-25 RX ORDER — INSULIN LISPRO 100/ML
4 VIAL (ML) SUBCUTANEOUS
Refills: 0 | Status: DISCONTINUED | OUTPATIENT
Start: 2024-05-25 | End: 2024-05-25

## 2024-05-25 RX ORDER — INSULIN GLARGINE 100 [IU]/ML
10 INJECTION, SOLUTION SUBCUTANEOUS AT BEDTIME
Refills: 0 | Status: DISCONTINUED | OUTPATIENT
Start: 2024-05-25 | End: 2024-05-25

## 2024-05-25 RX ORDER — INSULIN GLARGINE 100 [IU]/ML
8 INJECTION, SOLUTION SUBCUTANEOUS AT BEDTIME
Refills: 0 | Status: DISCONTINUED | OUTPATIENT
Start: 2024-05-25 | End: 2024-05-26

## 2024-05-25 RX ADMIN — LIDOCAINE 1 PATCH: 4 CREAM TOPICAL at 12:20

## 2024-05-25 RX ADMIN — INSULIN GLARGINE 8 UNIT(S): 100 INJECTION, SOLUTION SUBCUTANEOUS at 22:40

## 2024-05-25 RX ADMIN — URSODIOL 250 MILLIGRAM(S): 250 TABLET, FILM COATED ORAL at 06:02

## 2024-05-25 RX ADMIN — HEPARIN SODIUM 5000 UNIT(S): 5000 INJECTION INTRAVENOUS; SUBCUTANEOUS at 06:02

## 2024-05-25 RX ADMIN — HEPARIN SODIUM 5000 UNIT(S): 5000 INJECTION INTRAVENOUS; SUBCUTANEOUS at 00:00

## 2024-05-25 RX ADMIN — POLYETHYLENE GLYCOL 3350 17 GRAM(S): 17 POWDER, FOR SOLUTION ORAL at 12:19

## 2024-05-25 RX ADMIN — HEPARIN SODIUM 5000 UNIT(S): 5000 INJECTION INTRAVENOUS; SUBCUTANEOUS at 13:30

## 2024-05-25 RX ADMIN — Medication 25 MILLIGRAM(S): at 18:02

## 2024-05-25 RX ADMIN — FAMOTIDINE 40 MILLIGRAM(S): 10 INJECTION INTRAVENOUS at 12:19

## 2024-05-25 RX ADMIN — HEPARIN SODIUM 5000 UNIT(S): 5000 INJECTION INTRAVENOUS; SUBCUTANEOUS at 22:40

## 2024-05-25 RX ADMIN — SENNA PLUS 2 TABLET(S): 8.6 TABLET ORAL at 22:41

## 2024-05-25 RX ADMIN — CHOLESTYRAMINE 4 GRAM(S): 4 POWDER, FOR SUSPENSION ORAL at 09:33

## 2024-05-25 RX ADMIN — LIDOCAINE 1 PATCH: 4 CREAM TOPICAL at 19:00

## 2024-05-25 RX ADMIN — ATENOLOL 50 MILLIGRAM(S): 25 TABLET ORAL at 06:02

## 2024-05-25 RX ADMIN — Medication 25 MILLIGRAM(S): at 06:47

## 2024-05-25 RX ADMIN — LIDOCAINE 1 PATCH: 4 CREAM TOPICAL at 00:50

## 2024-05-25 RX ADMIN — URSODIOL 250 MILLIGRAM(S): 250 TABLET, FILM COATED ORAL at 17:54

## 2024-05-25 RX ADMIN — SODIUM CHLORIDE 75 MILLILITER(S): 9 INJECTION, SOLUTION INTRAVENOUS at 08:50

## 2024-05-25 RX ADMIN — Medication 3 UNIT(S): at 13:27

## 2024-05-25 NOTE — PROGRESS NOTE ADULT - ASSESSMENT
64 yo man with history of HTN, HLD, type 2 DM (on insulin), hyperthyroidism, asthma, GERD, and RAMANA (not on CPAP) presents with 2 weeks of painless jaundice, found to have  a 3.5 x 4.5 x 3.0 cm heterogenous solid mass in the pancreatic head/uncinate process with extrahepatic and intrahepatic biliary dilatation now s/p ERCP w/ stent placement and EUS w/ biopsy showing invasive moderately-differentiated adenocarcinoma.   62 yo man with history of HTN, HLD, type 2 DM (on insulin), hyperthyroidism, asthma, GERD, and RAMANA (not on CPAP) presents with 2 weeks of painless jaundice, found to have  a 3.5 x 4.5 x 3.0 cm heterogenous solid mass in the pancreatic head/uncinate process with extrahepatic and intrahepatic biliary dilatation now s/p ERCP w/ stent placement and EUS w/ biopsy showing invasive moderately-differentiated adenocarcinoma. Hospital course complicated w/ SOFIE and persistently elevated LFTs.

## 2024-05-25 NOTE — PROGRESS NOTE ADULT - PROBLEM SELECTOR PLAN 2
CTAP with IV contrast demonstrating a 3.5 x 4.5 x 3.0 cm heterogenous solid mass in the pancreatic head/uncinate process with upstream main pancreatic ductal dilatation as well as extrahepatic and intrahepatic biliary ductal dilatation, including CBD dilation to 2.2 cm. Elevated CA 19-9 and CEA.   s/p EUS W/ biopsy - invasive moderately differentiated adenocarcinoma.     - surg onc consulted -> outpatient surg onc workup   - Plan as above for cholestatic jaundice  - outpatient onc f/u CTAP with IV contrast demonstrating a 3.5 x 4.5 x 3.0 cm heterogenous solid mass in the pancreatic head/uncinate process with upstream main pancreatic ductal dilatation as well as extrahepatic and intrahepatic biliary ductal dilatation, including CBD dilation to 2.2 cm. Elevated CA 19-9 and CEA.   s/p EUS W/ biopsy - invasive moderately differentiated adenocarcinoma.     - Plan as above for cholestatic jaundice  - outpatient onc f/u

## 2024-05-25 NOTE — PROGRESS NOTE ADULT - PROBLEM SELECTOR PLAN 4
WBC 10.83 on admission. Pt afebrile, did not meet SIRS criteria. Possibly from malignancy.   Bump in WBC from 7.47-> 13.52-> 9.5    Resolved    - daily cbc  - monitor fever curve

## 2024-05-25 NOTE — PROGRESS NOTE ADULT - PROBLEM SELECTOR PLAN 5
BPs in acceptable range on admission. Pt on atenolol 50 mg daily and some other BP med that pt cannot remember the name of at this time (?lisinopril 20 mg daily).    - C/w atenolol 50 mg daily   - med rec   - Monitor VS q8hrs BPs in acceptable range on admission. Pt on atenolol 50 mg daily and some other BP med that pt cannot remember the name of at this time (?lisinopril 20 mg daily).    - C/w atenolol 50 mg daily   - Monitor VS q8hrs

## 2024-05-25 NOTE — PROGRESS NOTE ADULT - PROBLEM SELECTOR PLAN 7
Pt states that he now takes Lantus 50 u qHS and Admelog 50 u qAM before breakfast at home.   A1c 9%. Episodes of hypoglycemia while NPO    - appreciate endo recs  - continue lantus to 20 units at bedtime, admelog 7 units premeal   - low dose insulin sliding scale premeal and bedtime  - hypoglycemia protocol  - freestyle hung 3 sent to pharm Pt states that he now takes Lantus 50 u qHS and Admelog 50 u qAM before breakfast at home.   A1c 9%. Episodes of hypoglycemia while NPO    - f/u endo recs  - decrease lantus to 10 units at bedtime  - continue admelog 7 units premeal   - low dose insulin sliding scale premeal and bedtime  - hypoglycemia protocol  - freestyle hung 3 sent to pharm

## 2024-05-25 NOTE — PROGRESS NOTE ADULT - PROBLEM SELECTOR PLAN 6
- C/w atorvastatin 40 mg qHS as therapeutic interchange for rosuvastatin 10 mg qHS  - f/u lipid profile - C/w atorvastatin 40 mg qHS as therapeutic interchange for rosuvastatin 10 mg qHS

## 2024-05-25 NOTE — PROGRESS NOTE ADULT - SUBJECTIVE AND OBJECTIVE BOX
Chief Complaint: Uncontrolled DM2 w/ hypo& hyperglycemia     History: Pt seen at bedside. Pt tolerating oral diet. Pt denies nausea and vomiting. pos hypoglycemia. Pt reports an adequate appetite but doesn't like the hospital food.     MEDICATIONS  (STANDING):  atenolol  Tablet 50 milliGRAM(s) Oral daily  cholestyramine Powder (Sugar-Free) 4 Gram(s) Oral daily  dextrose 10% Bolus 125 milliLiter(s) IV Bolus once  dextrose 5%. 1000 milliLiter(s) (50 mL/Hr) IV Continuous <Continuous>  dextrose 5%. 1000 milliLiter(s) (100 mL/Hr) IV Continuous <Continuous>  dextrose 50% Injectable 12.5 Gram(s) IV Push once  dextrose 50% Injectable 25 Gram(s) IV Push once  famotidine    Tablet 40 milliGRAM(s) Oral daily  glucagon  Injectable 1 milliGRAM(s) IntraMuscular once  heparin   Injectable 5000 Unit(s) SubCutaneous every 8 hours  insulin glargine Injectable (LANTUS) 8 Unit(s) SubCutaneous at bedtime  insulin lispro (ADMELOG) corrective regimen sliding scale   SubCutaneous three times a day before meals  insulin lispro (ADMELOG) corrective regimen sliding scale   SubCutaneous at bedtime  insulin lispro Injectable (ADMELOG) 3 Unit(s) SubCutaneous three times a day before meals  lidocaine   4% Patch 1 Patch Transdermal daily  polyethylene glycol 3350 17 Gram(s) Oral daily  senna 2 Tablet(s) Oral at bedtime  ursodiol Tablet 250 milliGRAM(s) Oral two times a day    MEDICATIONS  (PRN):  acetaminophen     Tablet .. 650 milliGRAM(s) Oral every 6 hours PRN Temp greater or equal to 38C (100.4F), Mild Pain (1 - 3), Moderate Pain (4 - 6), Severe Pain (7 - 10)  albuterol/ipratropium for Nebulization 3 milliLiter(s) Nebulizer every 6 hours PRN Shortness of Breath and/or Wheezing  dextrose Oral Gel 15 Gram(s) Oral once PRN Blood Glucose LESS THAN 70 milliGRAM(s)/deciliter  hydrOXYzine hydrochloride 25 milliGRAM(s) Oral every 8 hours PRN Itching  petrolatum white Ointment 1 Application(s) Topical every 6 hours PRN Dry lips      Allergies: No Known Allergies    Review of Systems:  Respiratory: No SOB, no cough  GI: No nausea, vomiting, abdominal pain  Endocrine: no polyuria, polydipsia    PHYSICAL EXAM:  VITALS: T(C): 37 (05-25-24 @ 13:23)  T(F): 98.6 (05-25-24 @ 13:23), Max: 98.6 (05-25-24 @ 13:23)  HR: 58 (05-25-24 @ 13:23) (58 - 62)  BP: 103/53 (05-25-24 @ 13:23) (103/53 - 126/77)  RR:  (18 - 18)  SpO2:  (100% - 100%)  Wt(kg): --  GENERAL: NAD, well-groomed, well-developed  RESPIRATORY: No labored breathing   GI: Soft, nontender, non distended  PSYCH: Alert and oriented x 3, normal affect, normal mood      CAPILLARY BLOOD GLUCOSE  POCT Blood Glucose.: 136 mg/dL (25 May 2024 12:09)  POCT Blood Glucose.: 74 mg/dL (25 May 2024 08:32)  POCT Blood Glucose.: 68 mg/dL (25 May 2024 08:30)  POCT Blood Glucose.: 137 mg/dL (24 May 2024 23:56)  POCT Blood Glucose.: 145 mg/dL (24 May 2024 22:39)  POCT Blood Glucose.: 49 mg/dL (24 May 2024 22:06)  POCT Blood Glucose.: 45 mg/dL (24 May 2024 22:05)  POCT Blood Glucose.: 49 mg/dL (24 May 2024 21:56)  POCT Blood Glucose.: 48 mg/dL (24 May 2024 21:54)  POCT Blood Glucose.: 115 mg/dL (24 May 2024 17:59)    A1C with Estimated Average Glucose (05.18.24 @ 06:35)    A1C with Estimated Average Glucose Result: 9.0   Estimated Average Glucose: 212      05-24    136  |  101  |  57<H>  ----------------------------<  129<H>  4.1   |  18<L>  |  2.20<H>    eGFR: 33<L>    Ca    8.4      05-24  Mg     2.50     05-24  Phos  4.1     05-24    TPro  7.0  /  Alb  2.9<L>  /  TBili  26.9<H>  /  DBili  >20.0<H>  /  AST  169<H>  /  ALT  55<H>  /  AlkPhos  535<H>  05-24      Thyroid Function Tests:  05-17 @ 16:22 TSH 0.70 FreeT4 -- T3 -- Anti TPO -- Anti Thyroglobulin Ab -- TSI --    Diet, DASH/TLC:   Sodium & Cholesterol Restricted  Consistent Carbohydrate No Snacks (CSTCHO)  No Pork (05-22-24 @ 11:49) [Active]

## 2024-05-25 NOTE — PROGRESS NOTE ADULT - PROBLEM SELECTOR PLAN 1
Pt with 2 weeks of painless jaundice, associated with 25-lb weight loss, foamy dark urine, and changes to his bowel habits and stool caliber for the past 1 month. LFTs significant for t bili 29.5, direct bili >20, alk phos 532, AST 71, and ALT 38, consistent with cholestasis in setting of pancreatic mass causing extrahepatic and intrahepatic biliary ductal dilatation, including CBD dilation to 2.2 cm.  s/p ERCP w/ stent placement on 5/21     - daily LFTs, consider CT A/P per GI if continued uptrend/elevated Tbili  - Low threshold to start antibiotics if pt spikes fever, has worsening leukocytosis, or increasing hyperbilirubinemia due to concern for cholangitis given CBD dilatation   - continue Atarax 25 mg q8hrs PRN for itching and cholestyramine 4 g daily, urosodiol 250mg bid   - C/w famotidine 40 mg daily Pt with 2 weeks of painless jaundice, associated with 25-lb weight loss, foamy dark urine, and changes to his bowel habits and stool caliber for the past 1 month.   LFTs significant for t bili 29.5, direct bili >20, alk phos 532, AST 71, and ALT 38, consistent with cholestasis in setting of pancreatic mass causing extrahepatic and intrahepatic biliary ductal dilatation, including CBD dilation to 2.2 cm.  s/p ERCP w/ stent placement on 5/21 but since still w/ persistent elevation of T bili. CT A/P biliary stent extending from level superior pancreatic head to the second duodenum    - transfer to surg service   - daily LFTs  - Low threshold to start antibiotics if pt spikes fever, has worsening leukocytosis, or increasing hyperbilirubinemia due to concern for cholangitis given CBD dilatation   - continue Atarax 25 mg q8hrs PRN for itching and cholestyramine 4 g daily, urosodiol 250mg bid   - C/w famotidine 40 mg daily

## 2024-05-25 NOTE — PROGRESS NOTE ADULT - SUBJECTIVE AND OBJECTIVE BOX
*******************************  Ava Helm MD (PGY-1)  Internal Medicine  Contact via [employer] TEAMS  *******************************    ADA COX  63y  Male    Patient is a 63y old  Male who presents with a chief complaint of Jaundice, pancreatic mass (24 May 2024 17:01)      Subjective:    Objective:  T(C): 36.7 (05-25-24 @ 05:03), Max: 36.7 (05-24-24 @ 21:20)  HR: 62 (05-25-24 @ 05:03) (57 - 62)  BP: 126/77 (05-25-24 @ 05:03) (106/62 - 126/77)  RR: 18 (05-25-24 @ 05:03) (18 - 18)  SpO2: 100% (05-25-24 @ 05:03) (100% - 100%)  I&O's Summary      PHYSICAL EXAM:  GENERAL: NAD  HEAD:  Atraumatic, Normocephalic  EYES: EOMI, PERRLA, conjunctiva and sclera clear  ENMT: Moist mucous membranes  NECK: Supple, No JVD, trachea midline   NERVOUS SYSTEM:  Alert & Oriented X3, Good concentration; Motor Strength 5/5 B/L upper and lower extremities; DTRs 2+ intact and symmetric  CHEST/LUNG: Clear to auscultation bilaterally; No rales, rhonchi, wheezing, or rubs  HEART: Regular rate and rhythm; No murmurs, rubs, or gallops  ABDOMEN: Soft, Nontender, Nondistended; Bowel sounds present  EXTREMITIES:  2+ Peripheral Pulses, No clubbing, cyanosis, or edema  LYMPH: No lymphadenopathy noted  SKIN: No rashes or lesions    MEDICATIONS  (STANDING):  atenolol  Tablet 50 milliGRAM(s) Oral daily  cholestyramine Powder (Sugar-Free) 4 Gram(s) Oral daily  dextrose 10% Bolus 125 milliLiter(s) IV Bolus once  dextrose 5%. 1000 milliLiter(s) (50 mL/Hr) IV Continuous <Continuous>  dextrose 5%. 1000 milliLiter(s) (100 mL/Hr) IV Continuous <Continuous>  dextrose 50% Injectable 25 Gram(s) IV Push once  dextrose 50% Injectable 12.5 Gram(s) IV Push once  famotidine    Tablet 40 milliGRAM(s) Oral daily  glucagon  Injectable 1 milliGRAM(s) IntraMuscular once  heparin   Injectable 5000 Unit(s) SubCutaneous every 8 hours  insulin glargine Injectable (LANTUS) 20 Unit(s) SubCutaneous at bedtime  insulin lispro (ADMELOG) corrective regimen sliding scale   SubCutaneous three times a day before meals  insulin lispro (ADMELOG) corrective regimen sliding scale   SubCutaneous at bedtime  insulin lispro Injectable (ADMELOG) 7 Unit(s) SubCutaneous three times a day before meals  lactated ringers. 1000 milliLiter(s) (75 mL/Hr) IV Continuous <Continuous>  lidocaine   4% Patch 1 Patch Transdermal daily  polyethylene glycol 3350 17 Gram(s) Oral daily  senna 2 Tablet(s) Oral at bedtime  ursodiol Tablet 250 milliGRAM(s) Oral two times a day    MEDICATIONS  (PRN):  acetaminophen     Tablet .. 650 milliGRAM(s) Oral every 6 hours PRN Temp greater or equal to 38C (100.4F), Mild Pain (1 - 3), Moderate Pain (4 - 6), Severe Pain (7 - 10)  albuterol/ipratropium for Nebulization 3 milliLiter(s) Nebulizer every 6 hours PRN Shortness of Breath and/or Wheezing  dextrose Oral Gel 15 Gram(s) Oral once PRN Blood Glucose LESS THAN 70 milliGRAM(s)/deciliter  hydrOXYzine hydrochloride 25 milliGRAM(s) Oral every 8 hours PRN Itching  petrolatum white Ointment 1 Application(s) Topical every 6 hours PRN Dry lips      LABS:        CAPILLARY BLOOD GLUCOSE      POCT Blood Glucose.: 137 mg/dL (24 May 2024 23:56)  POCT Blood Glucose.: 145 mg/dL (24 May 2024 22:39)  POCT Blood Glucose.: 49 mg/dL (24 May 2024 22:06)  POCT Blood Glucose.: 45 mg/dL (24 May 2024 22:05)  POCT Blood Glucose.: 49 mg/dL (24 May 2024 21:56)  POCT Blood Glucose.: 48 mg/dL (24 May 2024 21:54)  POCT Blood Glucose.: 115 mg/dL (24 May 2024 17:59)  POCT Blood Glucose.: 112 mg/dL (24 May 2024 12:19)  POCT Blood Glucose.: 104 mg/dL (24 May 2024 08:50)      RADIOLOGY & ADDITIONAL TESTS:               *******************************  Ava Helm MD (PGY-1)  Internal Medicine  Contact via [employer] TEAMS  *******************************    ADA COX  63y  Male    Patient is a 63y old  Male who presents with a chief complaint of Jaundice, pancreatic mass (24 May 2024 17:01)    Subjective: Patient w/ hypoglycemia overnight, requiring D50 x1. Seen at bedside this morning. Patient w/ hiccups. Did not want AM labs drawn given he is a hard stick and required multiple attempts yesterday. Denied any fever, chills, chest pain, sob, ab pain, n/v.     Objective:  T(C): 36.7 (05-25-24 @ 05:03), Max: 36.7 (05-24-24 @ 21:20)  HR: 62 (05-25-24 @ 05:03) (57 - 62)  BP: 126/77 (05-25-24 @ 05:03) (106/62 - 126/77)  RR: 18 (05-25-24 @ 05:03) (18 - 18)  SpO2: 100% (05-25-24 @ 05:03) (100% - 100%)  I&O's Summary    PHYSICAL EXAM:  GENERAL: NAD  HEAD:  Atraumatic, Normocephalic  EYES: EOMI, PERRLA, scleral icterus  ENMT: Moist mucous membranes  NECK: Supple, No JVD, trachea midline   NERVOUS SYSTEM:  Alert & Oriented X3, Good concentration  CHEST/LUNG: Clear to auscultation bilaterally; No rales, rhonchi, wheezing, or rubs  HEART: Regular rate and rhythm; No murmurs, rubs, or gallops  ABDOMEN: Soft, Nontender, Nondistended; Bowel sounds present  EXTREMITIES:  2+ Peripheral Pulses, No clubbing, cyanosis, or edema  SKIN: jaundice    MEDICATIONS  (STANDING):  atenolol  Tablet 50 milliGRAM(s) Oral daily  cholestyramine Powder (Sugar-Free) 4 Gram(s) Oral daily  dextrose 10% Bolus 125 milliLiter(s) IV Bolus once  dextrose 5%. 1000 milliLiter(s) (50 mL/Hr) IV Continuous <Continuous>  dextrose 5%. 1000 milliLiter(s) (100 mL/Hr) IV Continuous <Continuous>  dextrose 50% Injectable 25 Gram(s) IV Push once  dextrose 50% Injectable 12.5 Gram(s) IV Push once  famotidine    Tablet 40 milliGRAM(s) Oral daily  glucagon  Injectable 1 milliGRAM(s) IntraMuscular once  heparin   Injectable 5000 Unit(s) SubCutaneous every 8 hours  insulin glargine Injectable (LANTUS) 20 Unit(s) SubCutaneous at bedtime  insulin lispro (ADMELOG) corrective regimen sliding scale   SubCutaneous three times a day before meals  insulin lispro (ADMELOG) corrective regimen sliding scale   SubCutaneous at bedtime  insulin lispro Injectable (ADMELOG) 7 Unit(s) SubCutaneous three times a day before meals  lactated ringers. 1000 milliLiter(s) (75 mL/Hr) IV Continuous <Continuous>  lidocaine   4% Patch 1 Patch Transdermal daily  polyethylene glycol 3350 17 Gram(s) Oral daily  senna 2 Tablet(s) Oral at bedtime  ursodiol Tablet 250 milliGRAM(s) Oral two times a day    MEDICATIONS  (PRN):  acetaminophen     Tablet .. 650 milliGRAM(s) Oral every 6 hours PRN Temp greater or equal to 38C (100.4F), Mild Pain (1 - 3), Moderate Pain (4 - 6), Severe Pain (7 - 10)  albuterol/ipratropium for Nebulization 3 milliLiter(s) Nebulizer every 6 hours PRN Shortness of Breath and/or Wheezing  dextrose Oral Gel 15 Gram(s) Oral once PRN Blood Glucose LESS THAN 70 milliGRAM(s)/deciliter  hydrOXYzine hydrochloride 25 milliGRAM(s) Oral every 8 hours PRN Itching  petrolatum white Ointment 1 Application(s) Topical every 6 hours PRN Dry lips    LABS:  no AM labs     CAPILLARY BLOOD GLUCOSE  POCT Blood Glucose.: 137 mg/dL (24 May 2024 23:56)  POCT Blood Glucose.: 145 mg/dL (24 May 2024 22:39)  POCT Blood Glucose.: 49 mg/dL (24 May 2024 22:06)  POCT Blood Glucose.: 45 mg/dL (24 May 2024 22:05)  POCT Blood Glucose.: 49 mg/dL (24 May 2024 21:56)  POCT Blood Glucose.: 48 mg/dL (24 May 2024 21:54)  POCT Blood Glucose.: 115 mg/dL (24 May 2024 17:59)  POCT Blood Glucose.: 112 mg/dL (24 May 2024 12:19)  POCT Blood Glucose.: 104 mg/dL (24 May 2024 08:50)    RADIOLOGY & ADDITIONAL TESTS:  < from: CT Abdomen and Pelvis No Cont (05.24.24 @ 14:49) >  FINDINGS:  LOWER CHEST: Coronary artery calcification.    LIVER: Within normal limits.  BILE DUCTS: Pneumobilia. Residual intrahepatic and common duct   dilatation. Biliary stent inferior to the heather hepatis at level superior   pancreatic head extending to the second duodenum  GALLBLADDER: Cholecystectomy.  SPLEEN: Within normal limits.  PANCREAS: Poorly defined pancreatic head mass.  ADRENALS: Within normal limits.  KIDNEYS/URETERS: Within normal limits.    BLADDER: Within normal limits.  REPRODUCTIVE ORGANS: Normal-sized prostate.    BOWEL: No bowel obstruction. Appendix not visualized. No evidence   appendicitis.  PERITONEUM: No ascites.  VESSELS: Within normal limits.  RETROPERITONEUM/LYMPH NODES: No lymphadenopathy.  ABDOMINAL WALL: Fat-containing bilateral inguinal hernias.  BONES: Degenerative change.    IMPRESSION:  Biliary stent extending from level superior pancreatic head to the second   duodenum with pneumobilia.    < end of copied text >

## 2024-05-25 NOTE — PROGRESS NOTE ADULT - SUBJECTIVE AND OBJECTIVE BOX
Surgery Progress Note    S: Patient seen and examined. No acute events overnight.     O:  Physical Exam:  Gen: Laying in bed, NAD  HEENT: atrumatic, EMOI  Resp: Unlabored breathing  Abd: soft, hanh-incisional tenderness, nondistended, no rebound or guarding. Drains serosanguinous   Ext: Moves 4 extremities spontaneously    Vital Signs Last 24 Hrs  T(C): 36.7 (25 May 2024 05:03), Max: 36.7 (24 May 2024 21:20)  T(F): 98.1 (25 May 2024 05:03), Max: 98.1 (24 May 2024 21:20)  HR: 62 (25 May 2024 05:03) (57 - 62)  BP: 126/77 (25 May 2024 05:03) (106/62 - 126/77)  BP(mean): --  RR: 18 (25 May 2024 05:03) (18 - 18)  SpO2: 100% (25 May 2024 05:03) (100% - 100%)    Parameters below as of 25 May 2024 05:03  Patient On (Oxygen Delivery Method): room air        I&O's Detail    24 May 2024 07:01  -  25 May 2024 07:00  --------------------------------------------------------  IN:    Lactated Ringers: 825 mL  Total IN: 825 mL    OUT:  Total OUT: 0 mL    Total NET: 825 mL                                13.4   10.21 )-----------( 300      ( 24 May 2024 17:33 )             37.3       05-24    136  |  101  |  57<H>  ----------------------------<  129<H>  4.1   |  18<L>  |  2.20<H>    Ca    8.4      24 May 2024 17:33  Phos  4.1     05-24  Mg     2.50     05-24    TPro  7.0  /  Alb  2.9<L>  /  TBili  26.9<H>  /  DBili  >20.0<H>  /  AST  169<H>  /  ALT  55<H>  /  AlkPhos  535<H>  05-24       Surgery Progress Note    S: Patient seen and examined. No acute events overnight. Hiccuping a lot. Saying he is frustrated and would like to go home.     O:  Physical Exam:  Gen: Laying in bed, NAD  HEENT: atraumatic, hiccuping frequently  Resp: Unlabored breathing  Abd: soft, nontender, nondistended  Ext: Moves 4 extremities spontaneously    Vital Signs Last 24 Hrs  T(C): 36.7 (25 May 2024 05:03), Max: 36.7 (24 May 2024 21:20)  T(F): 98.1 (25 May 2024 05:03), Max: 98.1 (24 May 2024 21:20)  HR: 62 (25 May 2024 05:03) (57 - 62)  BP: 126/77 (25 May 2024 05:03) (106/62 - 126/77)  BP(mean): --  RR: 18 (25 May 2024 05:03) (18 - 18)  SpO2: 100% (25 May 2024 05:03) (100% - 100%)    Parameters below as of 25 May 2024 05:03  Patient On (Oxygen Delivery Method): room air        I&O's Detail    24 May 2024 07:01  -  25 May 2024 07:00  --------------------------------------------------------  IN:    Lactated Ringers: 825 mL  Total IN: 825 mL    OUT:  Total OUT: 0 mL    Total NET: 825 mL                                13.4   10.21 )-----------( 300      ( 24 May 2024 17:33 )             37.3       05-24    136  |  101  |  57<H>  ----------------------------<  129<H>  4.1   |  18<L>  |  2.20<H>    Ca    8.4      24 May 2024 17:33  Phos  4.1     05-24  Mg     2.50     05-24    TPro  7.0  /  Alb  2.9<L>  /  TBili  26.9<H>  /  DBili  >20.0<H>  /  AST  169<H>  /  ALT  55<H>  /  AlkPhos  535<H>  05-24

## 2024-05-25 NOTE — PROGRESS NOTE ADULT - NSPROGADDITIONALINFOA_GEN_ALL_CORE
DVT ppx: SQH  Diet: CC  Dispo: pending clinical course
DVT ppx: SQH  Diet: CC  Dispo: transfer to surg service

## 2024-05-25 NOTE — PROGRESS NOTE ADULT - PROBLEM SELECTOR PLAN 3
TORSTEN  Karen Goldstein is a 44 year old female  who presents with menorrhagia for further evaluation and treatment.    This patient cycles every 3 weeks and has 8 day periods with 2 days being quite heavy.  On her heavy days, she soaks through pads and has had to leave work.  She has a history of anemia associated with this.  While living in [place], she was offered hysterectomy a couple of times but declined because she was afraid of complications.  Recently, her primary care physician treated her with 21 days of Provera 5 mg daily.  That did lighten up her bleeding.  She is now done with that course.    She finds her situation quite distressing and really wants to do something about it.    A pelvic ultrasound on  demonstrated a mildly enlarged uterus measuring 11.8 x 7.9 x 6.3 cm.  There was diffuse heterogeneity of the myometrium.  The endometrial stripe measured 10 mm in thickness.  Ovaries had small follicles but were otherwise unremarkable.  Her primary care physician was concerned about the 10 mm stripe.    She reports a D&C for menorrhagia many years ago.    She and her  had been using condoms for birth control.  He did have a stroke in the therefore do not have intercourse much at this point.    She also has a history of urinary stress incontinence.  She was treated with collagen injections years ago in [place].  That treatment was helpful.  She now notes a little worsening of her symptoms with loss of urine with sneezing and running.  At this point she declines treatment.    She had a negative mammogram in 2020.    She has no history of abnormal Pap smears.  Her last Pap was done in 2020.     MEDICATIONS:  Current Outpatient Medications   Medication Sig Dispense Refill   • Ferrous Sulfate (IRON PO)      • traZODone (DESYREL) 50 MG tablet TAKE ONE TABLET BY MOUTH ONCE NIGHTLY 90 tablet 0   • methotrexate (RHEUMATREX) 2.5 MG tablet Take 4 tab po once weekly x 2  weeks, Then 6 tab po once weekly. 20 tablet 0   • folic acid (FOLATE) 1 MG tablet Take 1 tablet by mouth daily. 90 tablet 3   • levothyroxine 150 MCG tablet Take 1 tablet by mouth daily. 30 tablet 0     Current Facility-Administered Medications   Medication Dose Route Frequency Provider Last Rate Last Dose   • [START ON 2020] medroxyPROGESTERone (DEPO-PROVERA) 150 MG/ML injection 150 mg  150 mg Intramuscular Q12 Weeks Katlyn Andrews MD           ALLERGIES:  Allergies as of 2020   • (No Known Allergies)       NEW MEDICAL HISTORY:  Past Medical History:   Diagnosis Date   • Anxiety    • Arthritis     rhematoid   • Menorrhagia    • Stress incontinence     collagen injections helped for years   • Thyroid disease        SURGICAL HISTORY  Past Surgical History:   Procedure Laterality Date   • D and c      menorrhagia   • Tooth extraction         OBSTETRIC HISTORY  OB History    Para Term  AB Living   3 1 1 0 2 1   SAB TAB Ectopic Molar Multiple Live Births   2 0 0 0 0 1   Obstetric Comments   No D&Cs       FAMILY HISTORY:  Family History   Problem Relation Age of Onset   • Cancer Mother         pancreatic   • Cancer Maternal Grandmother         cervix,  at 95   • Thyroid Father    • Other Father         severe arthritis of hands   • Patient is unaware of any medical problems Brother    • Other Sister         hysterectomy. kidney prob   • Other Sister         hysterectomy       SOCIAL HISTORY:  Social History     Socioeconomic History   • Marital status: /Civil Union     Spouse name: Not on file   • Number of children: Not on file   • Years of education: Not on file   • Highest education level: Not on file   Occupational History   • Not on file   Social Needs   • Financial resource strain: Not on file   • Food insecurity     Worry: Not on file     Inability: Not on file   • Transportation needs     Medical: Not on file     Non-medical: Not on file   Tobacco Use   • Smoking  status: Never Smoker   • Smokeless tobacco: Never Used   Substance and Sexual Activity   • Alcohol use: Yes     Frequency: Monthly or less     Drinks per session: 1 or 2   • Drug use: Never   • Sexual activity: Yes     Partners: Male   Lifestyle   • Physical activity     Days per week: Not on file     Minutes per session: Not on file   • Stress: Not on file   Relationships   • Social connections     Talks on phone: Not on file     Gets together: Not on file     Attends Nondenominational service: Not on file     Active member of club or organization: Not on file     Attends meetings of clubs or organizations: Not on file     Relationship status: Not on file   • Intimate partner violence     Fear of current or ex partner: Not on file     Emotionally abused: Not on file     Physically abused: Not on file     Forced sexual activity: Not on file   Other Topics Concern   • Not on file   Social History Narrative   • Not on file     She and  moved here from [place].  Shortly after they arrived here, he her  had a stroke.  He is doing okay but recovering slowly.  Patient's daughter and grandchild are presently visiting from [place].    PHYSICAL EXAMINATION:  VITAL SIGNS:    Visit Vitals  /70   Ht 5' 3\" (1.6 m)   Wt 65.8 kg   LMP 10/23/2020   BMI 25.69 kg/m²     GENERAL:  Well-developed, well-nourished female in no apparent distress.  AFFECT:  No depression.  Alert and oriented x3.  ABDOMEN:  Soft, nontender, nondistended.  No hepatosplenomegaly.  EXTREMITIES:  No clubbing, cyanosis or edema.  VULVA:  Within normal limits.  No lesions are noted.  No erythema or  discharge.  URETHRAL MEATUS:  No lesions are noted, nontender.  URETHRA:  Nontender.  BLADDER:  No masses and nontender.  VAGINA:  Rugae consistent with age, no discharge.  No lesions are noted.  CERVIX:  Visually unremakable, no lesions.  UTERUS:  Anteverted firm nontender mobile approximately 10 week size.  No masses.  ADNEXA:  Non tender, no masses  noted.    Impression:  1. Long history of menorrhagia.  Three week cycles are consistent with early perimenopausal changes.  The uterus is mildly enlarged likely contributing to the menorrhagia.  The endometrial stripe appears normal for a woman who is still cycling.  2. Stress incontinence symptoms.  Status post collagen injection treatment.    Plan:  1. At this point, unless the patient decides to proceed with an endometrial ablation, I do not see a reason to perform an endometrial biopsy.  2. We discussed options of expectant management, Depo-Provera, OCPs, endometrial ablation, and hysterectomy.  The pros cons and risk of every 1 of these options was discussed in detail.  The patient opted for Depo-Provera.  She will call with a menses to schedule the 1st injection to be done within the 1st 5 days and then every 12 weeks thereafter.  3. If she is not happy with the Depo-Provera she should see me sooner.  Otherwise return yearly.  4. She declines referral regarding her stress incontinence symptoms at this time.  If things worsen she may call us for referral.       Cr 0.86 on admission ->2.82. Fena 0.3% prerenal, BUN 19. Suspect prerenal in setting of dehydration vs intrinsic- NELSY?(contrast received 5/17 so past the 24-48 hrs).   U/S negative for hydronephrosis, findings suggestive of CKD.     - f/u AM BMP   - monitor urine output  - avoid nephrotoxic agents  - renally dose meds Cr 0.86 on admission ->2.82-> downtrending to 2.2.  Fena 0.3% prerenal, BUN 19. Suspect prerenal in setting of poor PO intake.   U/S negative for hydronephrosis, findings suggestive of CKD.     - likely will benefit from more mIVF given poor PO intake  - daily BMPs  - monitor urine output  - avoid nephrotoxic agents  - renally dose meds

## 2024-05-25 NOTE — PROGRESS NOTE ADULT - ASSESSMENT
64 yo man with history of HTN, HLD, type 2 DM (on insulin), hyperthyroidism, asthma, GERD, and RAMANA (not on CPAP) presents with 2 weeks of painless jaundice, found to have  a 3.5 x 4.5 x 3.0 cm heterogenous solid mass in the pancreatic head/uncinate process with extrahepatic and intrahepatic biliary dilatation. Endocrinology consulted for assistance with management of uncontrolled DM2 w/ hyperglycemia and hypoglycemia iso newly discovered pancreatic mass.   Patient is high risk with high level decision making due to uncontrolled diabetes with A1C>9, new pancreatic mass and SOFIE which places patient at high risk for cardiovascular and cerebrovascular events. Patient with lability of glucose requiring close monitoring and insulin adjustments.    #Uncontrolled DM2 w/ hypo& hyperglycemia   A1c 9%   newly found pancreatic mass   egfr 68 - SOFIE  higher basal/bolus insulin requirements at home but likely that po intake/diet is also much different   Recommendations:   - BG goal 100-180: Hypoglycemia noted yesterday evening and this am. Due to pt receiving insulin and not eating.   - Received lantus 10 x 1 yesterday decrease further to Lantus 8 units sq qhs   - Decrease Admelog to 3 units SC Premeal/TIDAC - hold if npo or if eating < 25% of meals   - Continue Admelog Low Correction Scale Premeal & Separate LOW Correction Scale Bedtime - change to q6h if npo   - FS Blood glucose monitoring Premeal/Bedtime - change to q6h if npo: check 3 am FS   - Hypoglycemia protocol   - Carb Consistent Diet   - Nutrition consult   - Check c-peptide in am with bmp     DC Planning: Basal/bolus insulin pens. Doses TBD closer to discharge pending insulin requirements/clinical course.  Please ensure patient has prescriptions for diabetes supplies (glucometer, test strips, lancets, alcohol swabs, insulin pen needles).   **Patient interested in CGM- Please send script for [facility] 3 sensors and reader (d/w primary team).  Routine outpatient opthalmology & podiatry evaluations recommended. Patient can follow up with endocrinology at the location provided below: D/w that he needs endocrinologist outpt and he expressed wanting to ask his PCP to recommend one but also wants our information in his discharge incase he wants to call to make an appointment.     [facility]   [address] [place] [address] [address]  [place] [address]  [phone]    #Hx of Hyperthyroidism  #Hx of Thyroid Nodule   - outpatient endocrine follow up  - not on Methimazole   - on atenolol   - TSH 5/17 wnl at 0.7  -Discharge on atenolol and follow up with PCP/endocrinologist to monitor TFT's and repeat them in 4-6 weeks.     #HTN  Recommendations:   - outpt BP goal < 130/80   - defer to primary team   - outpatient annual urinary microalb/cr ratio  - on atenolol     #HLD  Recommendations:   - LDL goal < 70   - defer to primary team   - outpatient fasting lipid profile   - on atorvastatin 40mg qhs     Metabolic Acidosis   BHB previously 0.0  No labs noted today : pt is a very hard stick   would r/o causes for metabolic acidosis   would recommend checking vbg and lactate as well : 5/22 lactate 2.5 above goal    trend labs   defer to primary team     D/w Dr. Ava Helm Resident     Loretta Townsend  Nurse Practitioner  Division of Endocrinology & Diabetes  In house pager #[phone]    If before 9AM or after 6PM, or on weekends/holidays, please call endocrine answering service for assistance ([phone]).For nonurgent matters email [email or web address] for assistance.    62 yo man with history of HTN, HLD, type 2 DM (on insulin), hyperthyroidism, asthma, GERD, and RAMANA (not on CPAP) presents with 2 weeks of painless jaundice, found to have  a 3.5 x 4.5 x 3.0 cm heterogenous solid mass in the pancreatic head/uncinate process with extrahepatic and intrahepatic biliary dilatation. Endocrinology consulted for assistance with management of uncontrolled DM2 w/ hyperglycemia and hypoglycemia iso newly discovered pancreatic mass.   Patient is high risk with high level decision making due to uncontrolled diabetes with A1C>9, new pancreatic mass and SOFIE which places patient at high risk for cardiovascular and cerebrovascular events. Patient with lability of glucose requiring close monitoring and insulin adjustments.    #Uncontrolled DM2 w/ hypo& hyperglycemia   A1c 9%   newly found pancreatic mass   egfr 68 - SOFIE  higher basal/bolus insulin requirements at home but likely that po intake/diet is also much different   Recommendations:   - BG goal 100-180: Hypoglycemia noted yesterday evening and this am. Due to pt receiving insulin and not eating.   - Received lantus 10 x 1 yesterday decrease further to Lantus 8 units sq qhs   - Initially decreased Admelog to 3 units SC Premeal/TIDAC - hold if npo or if eating < 25% of meals now below goal at dinner; [facility] standing premeal insulin for now  - Continue Admelog Low Correction Scale Premeal & Separate LOW Correction Scale Bedtime - change to q6h if npo   - FS Blood glucose monitoring Premeal/Bedtime - change to q6h if npo: check 3 am FS   - Hypoglycemia protocol   - Carb Consistent Diet   - Nutrition consult   - Check c-peptide in am with [facility]     DC Planning: Basal/bolus insulin pens. Doses TBD closer to discharge pending insulin requirements/clinical course.  Please ensure patient has prescriptions for diabetes supplies (glucometer, test strips, lancets, alcohol swabs, insulin pen needles).   **Patient interested in CGM- Please send script for [facility] 3 sensors and reader (d/w primary team).  Routine outpatient opthalmology & podiatry evaluations recommended. Patient can follow up with endocrinology at the location provided below: D/w that he needs endocrinologist outpt and he expressed wanting to ask his PCP to recommend one but also wants our information in his discharge incase he wants to call to make an appointment.     [facility]   [address]  [place] [address]  [phone]    #Hx of Hyperthyroidism  #Hx of Thyroid Nodule   - outpatient endocrine follow up  - not on Methimazole   - on atenolol   - TSH 5/17 wnl at 0.7  -Discharge on atenolol and follow up with PCP/endocrinologist to monitor TFT's and repeat them in 4-6 weeks.     #HTN  Recommendations:   - outpt BP goal < 130/80   - defer to primary team   - outpatient annual urinary microalb/cr ratio  - on atenolol     #HLD  Recommendations:   - LDL goal < 70   - defer to primary team   - outpatient fasting lipid profile   - on atorvastatin 40mg qhs     Metabolic Acidosis   BHB previously 0.0  No labs noted today : pt is a very hard stick   would r/o causes for metabolic acidosis   would recommend checking vbg and lactate as well : 5/22 lactate 2.5 above goal    trend labs   defer to primary team     D/w Dr. Ava Helm Resident     Loretta Townsend  Nurse Practitioner  Division of Endocrinology & Diabetes  In house pager #[phone]    If before 9AM or after 6PM, or on weekends/holidays, please call endocrine answering service for assistance ([phone]).For nonurgent matters email [email or web address] for assistance.

## 2024-05-25 NOTE — PROGRESS NOTE ADULT - PROBLEM SELECTOR PROBLEM 7
Type 2 diabetes mellitus treated without insulin

## 2024-05-25 NOTE — PROGRESS NOTE ADULT - ASSESSMENT
63M PMH HTN, T2DM (on insulin), hemorrhoid, hyperthyroidism, asthma, GERD, and RAMANA (not on CPAP), surgical history of open cholecystectomy 15 years ago and appendectomy in 1975, p/w 6 weeks of progressive fatigue, painless jaundice, malodorous loose stools, worsening glycemic control, weight loss, pruritus who was found to have a pancreatic head/uncinate neck mass with upstream pancreatic and biliary ductal dilatation with elevated tumor CA-19-9 and CEA concerning for pancreatic adenocarcinoma, bx confirms diagnosis.      PLAN:    - S/p ERCP and stent  - bx c/w pancreatic adenocarcinoma   - T bili remains elevated, continue to trend  - No surgical intervention at this time, patient will need to be presented at [facility] to discuss neoadjuvant treatment  - transfer to surgical service under Dr. Ismael SKELTON Team Surgery  [id number]

## 2024-05-26 LAB
ALBUMIN SERPL ELPH-MCNC: 2.3 G/DL — LOW (ref 3.3–5)
ALP SERPL-CCNC: 507 U/L — HIGH (ref 40–120)
ALT FLD-CCNC: 51 U/L — HIGH (ref 4–41)
ANION GAP SERPL CALC-SCNC: 14 MMOL/L — SIGNIFICANT CHANGE UP (ref 7–14)
AST SERPL-CCNC: 135 U/L — HIGH (ref 4–40)
BILIRUB DIRECT SERPL-MCNC: 19.5 MG/DL — HIGH (ref 0–0.3)
BILIRUB INDIRECT FLD-MCNC: 3.9 MG/DL — HIGH (ref 0–1)
BILIRUB SERPL-MCNC: 23.4 MG/DL — HIGH (ref 0.2–1.2)
BUN SERPL-MCNC: 43 MG/DL — HIGH (ref 7–23)
CALCIUM SERPL-MCNC: 8.7 MG/DL — SIGNIFICANT CHANGE UP (ref 8.4–10.5)
CHLORIDE SERPL-SCNC: 104 MMOL/L — SIGNIFICANT CHANGE UP (ref 98–107)
CO2 SERPL-SCNC: 18 MMOL/L — LOW (ref 22–31)
CREAT SERPL-MCNC: 1.73 MG/DL — HIGH (ref 0.5–1.3)
EGFR: 44 ML/MIN/1.73M2 — LOW
GLUCOSE BLDC GLUCOMTR-MCNC: 112 MG/DL — HIGH (ref 70–99)
GLUCOSE BLDC GLUCOMTR-MCNC: 174 MG/DL — HIGH (ref 70–99)
GLUCOSE BLDC GLUCOMTR-MCNC: 184 MG/DL — HIGH (ref 70–99)
GLUCOSE BLDC GLUCOMTR-MCNC: 215 MG/DL — HIGH (ref 70–99)
GLUCOSE BLDC GLUCOMTR-MCNC: 87 MG/DL — SIGNIFICANT CHANGE UP (ref 70–99)
GLUCOSE SERPL-MCNC: 100 MG/DL — HIGH (ref 70–99)
HCT VFR BLD CALC: 33.5 % — LOW (ref 39–50)
HGB BLD-MCNC: 12.4 G/DL — LOW (ref 13–17)
MAGNESIUM SERPL-MCNC: 2.3 MG/DL — SIGNIFICANT CHANGE UP (ref 1.6–2.6)
MCHC RBC-ENTMCNC: 30.7 PG — SIGNIFICANT CHANGE UP (ref 27–34)
MCHC RBC-ENTMCNC: 37 GM/DL — HIGH (ref 32–36)
MCV RBC AUTO: 82.9 FL — SIGNIFICANT CHANGE UP (ref 80–100)
NRBC # BLD: 0 /100 WBCS — SIGNIFICANT CHANGE UP (ref 0–0)
NRBC # FLD: 0 K/UL — SIGNIFICANT CHANGE UP (ref 0–0)
PHOSPHATE SERPL-MCNC: 3.6 MG/DL — SIGNIFICANT CHANGE UP (ref 2.5–4.5)
PLATELET # BLD AUTO: 195 K/UL — SIGNIFICANT CHANGE UP (ref 150–400)
POTASSIUM SERPL-MCNC: 4.2 MMOL/L — SIGNIFICANT CHANGE UP (ref 3.5–5.3)
POTASSIUM SERPL-SCNC: 4.2 MMOL/L — SIGNIFICANT CHANGE UP (ref 3.5–5.3)
PROT SERPL-MCNC: 6.1 G/DL — SIGNIFICANT CHANGE UP (ref 6–8.3)
RBC # BLD: 4.04 M/UL — LOW (ref 4.2–5.8)
RBC # FLD: 23.1 % — HIGH (ref 10.3–14.5)
SODIUM SERPL-SCNC: 136 MMOL/L — SIGNIFICANT CHANGE UP (ref 135–145)
WBC # BLD: 9.72 K/UL — SIGNIFICANT CHANGE UP (ref 3.8–10.5)
WBC # FLD AUTO: 9.72 K/UL — SIGNIFICANT CHANGE UP (ref 3.8–10.5)

## 2024-05-26 PROCEDURE — 99232 SBSQ HOSP IP/OBS MODERATE 35: CPT

## 2024-05-26 RX ORDER — INSULIN GLARGINE 100 [IU]/ML
6 INJECTION, SOLUTION SUBCUTANEOUS AT BEDTIME
Refills: 0 | Status: DISCONTINUED | OUTPATIENT
Start: 2024-05-26 | End: 2024-05-28

## 2024-05-26 RX ADMIN — POLYETHYLENE GLYCOL 3350 17 GRAM(S): 17 POWDER, FOR SOLUTION ORAL at 14:52

## 2024-05-26 RX ADMIN — FAMOTIDINE 40 MILLIGRAM(S): 10 INJECTION INTRAVENOUS at 14:51

## 2024-05-26 RX ADMIN — HEPARIN SODIUM 5000 UNIT(S): 5000 INJECTION INTRAVENOUS; SUBCUTANEOUS at 14:51

## 2024-05-26 RX ADMIN — CHOLESTYRAMINE 4 GRAM(S): 4 POWDER, FOR SUSPENSION ORAL at 12:59

## 2024-05-26 RX ADMIN — URSODIOL 250 MILLIGRAM(S): 250 TABLET, FILM COATED ORAL at 17:46

## 2024-05-26 RX ADMIN — HEPARIN SODIUM 5000 UNIT(S): 5000 INJECTION INTRAVENOUS; SUBCUTANEOUS at 23:00

## 2024-05-26 RX ADMIN — LIDOCAINE 1 PATCH: 4 CREAM TOPICAL at 20:14

## 2024-05-26 RX ADMIN — Medication 1: at 13:00

## 2024-05-26 RX ADMIN — HEPARIN SODIUM 5000 UNIT(S): 5000 INJECTION INTRAVENOUS; SUBCUTANEOUS at 05:34

## 2024-05-26 RX ADMIN — Medication 25 MILLIGRAM(S): at 05:37

## 2024-05-26 RX ADMIN — Medication 25 MILLIGRAM(S): at 14:49

## 2024-05-26 RX ADMIN — INSULIN GLARGINE 6 UNIT(S): 100 INJECTION, SOLUTION SUBCUTANEOUS at 22:59

## 2024-05-26 RX ADMIN — LIDOCAINE 1 PATCH: 4 CREAM TOPICAL at 12:59

## 2024-05-26 RX ADMIN — URSODIOL 250 MILLIGRAM(S): 250 TABLET, FILM COATED ORAL at 05:50

## 2024-05-26 RX ADMIN — LIDOCAINE 1 PATCH: 4 CREAM TOPICAL at 00:39

## 2024-05-26 RX ADMIN — ATENOLOL 50 MILLIGRAM(S): 25 TABLET ORAL at 05:34

## 2024-05-26 NOTE — PROGRESS NOTE ADULT - SUBJECTIVE AND OBJECTIVE BOX
Surg/Onc Daily Resident Progress Note    OVERNIGHT:  No acute events.     SUBJECTIVE: Pt seen and examined at bedside without complains.     OBJECTIVE:  Vital Signs Last 24 Hrs  T(C): 36.5 (26 May 2024 09:01), Max: 37 (25 May 2024 13:23)  T(F): 97.7 (26 May 2024 09:01), Max: 98.6 (25 May 2024 13:23)  HR: 58 (26 May 2024 09:01) (58 - 63)  BP: 106/51 (26 May 2024 09:01) (103/53 - 127/50)  BP(mean): --  RR: 18 (26 May 2024 09:01) (16 - 18)  SpO2: 100% (26 May 2024 09:01) (99% - 100%)    Parameters below as of 26 May 2024 09:01  Patient On (Oxygen Delivery Method): room air      Physical Exam:  Gen: Laying in bed, NAD  HEENT: atraumatic, hiccuping frequently  Resp: Unlabored breathing  Abd: soft, nontender, nondistended  Ext: Moves 4 extremities spontaneously    dextrose 10% Bolus milliLiter(s)  dextrose 5%. milliLiter(s) (50 mL/Hr)  dextrose 5%. milliLiter(s) (100 mL/Hr)      Medications:  MEDICATIONS  (STANDING):  atenolol  Tablet 50 milliGRAM(s) Oral daily  cholestyramine Powder (Sugar-Free) 4 Gram(s) Oral daily  dextrose 10% Bolus 125 milliLiter(s) IV Bolus once  dextrose 5%. 1000 milliLiter(s) (50 mL/Hr) IV Continuous <Continuous>  dextrose 5%. 1000 milliLiter(s) (100 mL/Hr) IV Continuous <Continuous>  dextrose 50% Injectable 25 Gram(s) IV Push once  dextrose 50% Injectable 12.5 Gram(s) IV Push once  famotidine    Tablet 40 milliGRAM(s) Oral daily  glucagon  Injectable 1 milliGRAM(s) IntraMuscular once  heparin   Injectable 5000 Unit(s) SubCutaneous every 8 hours  insulin glargine Injectable (LANTUS) 6 Unit(s) SubCutaneous at bedtime  insulin lispro (ADMELOG) corrective regimen sliding scale   SubCutaneous three times a day before meals  insulin lispro (ADMELOG) corrective regimen sliding scale   SubCutaneous at bedtime  lidocaine   4% Patch 1 Patch Transdermal daily  polyethylene glycol 3350 17 Gram(s) Oral daily  senna 2 Tablet(s) Oral at bedtime  ursodiol Tablet 250 milliGRAM(s) Oral two times a day    MEDICATIONS  (PRN):  acetaminophen     Tablet .. 650 milliGRAM(s) Oral every 6 hours PRN Temp greater or equal to 38C (100.4F), Mild Pain (1 - 3), Moderate Pain (4 - 6), Severe Pain (7 - 10)  albuterol/ipratropium for Nebulization 3 milliLiter(s) Nebulizer every 6 hours PRN Shortness of Breath and/or Wheezing  dextrose Oral Gel 15 Gram(s) Oral once PRN Blood Glucose LESS THAN 70 milliGRAM(s)/deciliter  hydrOXYzine hydrochloride 25 milliGRAM(s) Oral every 8 hours PRN Itching  petrolatum white Ointment 1 Application(s) Topical every 6 hours PRN Dry lips    Allergies:  No Known Allergies      Labs:  05-26    136  |  104  |  43<H>  ----------------------------<  100<H>  4.2   |  18<L>  |  1.73<H>    Ca    8.7      26 May 2024 05:09  Phos  3.6     05-26  Mg     2.30     05-26    TPro  6.1  /  Alb  2.3<L>  /  TBili  23.4<H>  /  DBili  19.5<H>  /  AST  135<H>  /  ALT  51<H>  /  AlkPhos  507<H>  05-26                          12.4   9.72  )-----------( 195      ( 26 May 2024 05:09 )             33.5

## 2024-05-26 NOTE — PROGRESS NOTE ADULT - SUBJECTIVE AND OBJECTIVE BOX
Chief Complaint: Type 2 DM vs type 3 C DM     History: Pt seen at bedside. Pt tolerating oral diet. Pt denies nausea and vomiting/any signs of hypoglycemia. Pt reports an adequate appetite.     MEDICATIONS  (STANDING):  atenolol  Tablet 50 milliGRAM(s) Oral daily  cholestyramine Powder (Sugar-Free) 4 Gram(s) Oral daily  dextrose 10% Bolus 125 milliLiter(s) IV Bolus once  dextrose 5%. 1000 milliLiter(s) (100 mL/Hr) IV Continuous <Continuous>  dextrose 5%. 1000 milliLiter(s) (50 mL/Hr) IV Continuous <Continuous>  dextrose 50% Injectable 12.5 Gram(s) IV Push once  dextrose 50% Injectable 25 Gram(s) IV Push once  famotidine    Tablet 40 milliGRAM(s) Oral daily  glucagon  Injectable 1 milliGRAM(s) IntraMuscular once  heparin   Injectable 5000 Unit(s) SubCutaneous every 8 hours  insulin glargine Injectable (LANTUS) 6 Unit(s) SubCutaneous at bedtime  insulin lispro (ADMELOG) corrective regimen sliding scale   SubCutaneous at bedtime  insulin lispro (ADMELOG) corrective regimen sliding scale   SubCutaneous three times a day before meals  lidocaine   4% Patch 1 Patch Transdermal daily  polyethylene glycol 3350 17 Gram(s) Oral daily  senna 2 Tablet(s) Oral at bedtime  ursodiol Tablet 250 milliGRAM(s) Oral two times a day    MEDICATIONS  (PRN):  acetaminophen     Tablet .. 650 milliGRAM(s) Oral every 6 hours PRN Temp greater or equal to 38C (100.4F), Mild Pain (1 - 3), Moderate Pain (4 - 6), Severe Pain (7 - 10)  albuterol/ipratropium for Nebulization 3 milliLiter(s) Nebulizer every 6 hours PRN Shortness of Breath and/or Wheezing  dextrose Oral Gel 15 Gram(s) Oral once PRN Blood Glucose LESS THAN 70 milliGRAM(s)/deciliter  hydrOXYzine hydrochloride 25 milliGRAM(s) Oral every 8 hours PRN Itching  petrolatum white Ointment 1 Application(s) Topical every 6 hours PRN Dry lips      Allergies: No Known Allergies    Review of Systems:  Respiratory: No SOB, no cough  GI: No nausea, vomiting, abdominal pain  Endocrine: no polyuria, polydipsia    PHYSICAL EXAM:  VITALS: T(C): 36.5 (05-26-24 @ 09:01)  T(F): 97.7 (05-26-24 @ 09:01), Max: 98.2 (05-26-24 @ 00:17)  HR: 58 (05-26-24 @ 09:01) (58 - 63)  BP: 106/51 (05-26-24 @ 09:01) (103/62 - 127/50)  RR:  (16 - 18)  SpO2:  (99% - 100%)  Wt(kg): --  GENERAL: NAD, well-groomed, well-developed  RESPIRATORY: No labored breathing   GI: Soft, nontender, non distended  PSYCH: Alert and oriented x 3, normal affect, normal mood      CAPILLARY BLOOD GLUCOSE  POCT Blood Glucose.: 174 mg/dL (26 May 2024 12:29)  POCT Blood Glucose.: 87 mg/dL (26 May 2024 08:11)  POCT Blood Glucose.: 112 mg/dL (26 May 2024 03:38)  POCT Blood Glucose.: 113 mg/dL (25 May 2024 22:18)  POCT Blood Glucose.: 129 mg/dL (25 May 2024 20:19)  POCT Blood Glucose.: 74 mg/dL (25 May 2024 17:51)    A1C with Estimated Average Glucose (05.18.24 @ 06:35)    A1C with Estimated Average Glucose Result: 9.0   Estimated Average Glucose: 212      05-26    136  |  104  |  43<H>  ----------------------------<  100<H>  4.2   |  18<L>  |  1.73<H>    eGFR: 44<L>    Ca    8.7      05-26  Mg     2.30     05-26  Phos  3.6     05-26    TPro  6.1  /  Alb  2.3<L>  /  TBili  23.4<H>  /  DBili  19.5<H>  /  AST  135<H>  /  ALT  51<H>  /  AlkPhos  507<H>  05-26      Thyroid Function Tests:  05-17 @ 16:22 TSH 0.70 FreeT4 -- T3 -- Anti TPO -- Anti Thyroglobulin Ab -- TSI --      Diet, DASH/TLC:   Sodium & Cholesterol Restricted  Consistent Carbohydrate No Snacks (CSTCHO)  No Pork (05-22-24 @ 11:49) [Active]

## 2024-05-26 NOTE — PROGRESS NOTE ADULT - ASSESSMENT
64 yo man with history of HTN, HLD, type 2 DM (on insulin), hyperthyroidism, asthma, GERD, and RAMANA (not on CPAP) presents with 2 weeks of painless jaundice, found to have  a 3.5 x 4.5 x 3.0 cm heterogenous solid mass in the pancreatic head/uncinate process with extrahepatic and intrahepatic biliary dilatation. Endocrinology consulted for assistance with management of uncontrolled DM2 w/ hyperglycemia and hypoglycemia iso newly discovered pancreatic mass.   Patient is high risk with high level decision making due to uncontrolled diabetes with A1C>9, new pancreatic mass and SOFIE which places patient at high risk for cardiovascular and cerebrovascular events. Patient with lability of glucose requiring close monitoring and insulin adjustments.    #Uncontrolled DM2 w/ hypo& hyperglycemia vs type 3 C DM  A1c 9%   newly found pancreatic mass   egfr 68 - SOFIE  higher basal/bolus insulin requirements at home but likely that po intake/diet is also much different   Recommendations:   - BG goal 100-180: hypoglycemia yesterday. Below goal this morning   - Decrease Lantus to 6 units sq qhs   - Continue to hold standing premeal insulin for now   - Continue Admelog Low Correction Scale Premeal & Separate LOW Correction Scale Bedtime - change to q6h if npo   - FS Blood glucose monitoring Premeal/Bedtime - change to q6h if npo: check 3 am FS   - Hypoglycemia protocol   - Carb Consistent Diet   - Nutrition consult   - Check c-peptide in am with bmp     DC Planning: Basal/bolus insulin pens. Doses TBD closer to discharge pending insulin requirements/clinical course.  Please ensure patient has prescriptions for diabetes supplies (glucometer, test strips, lancets, alcohol swabs, insulin pen needles).   **Patient interested in CGM- Please send script for [employer] 3 sensors and reader (d/w primary team).  Routine outpatient opthalmology & podiatry evaluations recommended. Patient can follow up with endocrinology at the location provided below: D/w that he needs endocrinologist outpt and he expressed wanting to ask his PCP to recommend one but also wants our information in his discharge incase he wants to call to make an appointment.     [facility]   [address] [place] [address] [phone]    #Hx of Hyperthyroidism  #Hx of Thyroid Nodule   - outpatient endocrine follow up  - not on Methimazole   - on atenolol   - TSH 5/17 wnl at 0.7  -Discharge on atenolol and follow up with PCP/endocrinologist to monitor TFT's and repeat them in 4-6 weeks.     #HTN  Recommendations:   - outpt BP goal < 130/80   - defer to primary team   - outpatient annual urinary microalb/cr ratio  - on atenolol     #HLD  Recommendations:   - LDL goal < 70   - defer to primary team   - outpatient fasting lipid profile   - on atorvastatin 40mg qhs     Metabolic Acidosis   defer to primary team   trend labs     Loretta Townsend  Nurse Practitioner  Division of Endocrinology & Diabetes  In house pager #[phone]    If before 9AM or after 6PM, or on weekends/holidays, please call endocrine answering service for assistance ([phone]).For nonurgent matters email [email or web address] for assistance.

## 2024-05-26 NOTE — PROGRESS NOTE ADULT - ASSESSMENT
63M PMH HTN, T2DM (on insulin), hemorrhoid, hyperthyroidism, asthma, GERD, and RAMANA (not on CPAP), surgical history of open cholecystectomy 15 years ago and appendectomy in 1975, p/w 6 weeks of progressive fatigue, painless jaundice, malodorous loose stools, worsening glycemic control, weight loss, pruritus who was found to have a pancreatic head/uncinate neck mass with upstream pancreatic and biliary ductal dilatation with elevated tumor CA-19-9 and CEA concerning for pancreatic adenocarcinoma, bx confirms diagnosis.  S/p ERCP and stent. bx c/w pancreatic adenocarcinoma     Plan:   - F/U MRCP  - T bili remains elevated, continue to trend  - No surgical intervention at this time, patient will need to be presented at [facility] to discuss neoadjuvant treatment      E Team Surgery  [id number]

## 2024-05-27 LAB
ALBUMIN SERPL ELPH-MCNC: 2.5 G/DL — LOW (ref 3.3–5)
ALP SERPL-CCNC: 582 U/L — HIGH (ref 40–120)
ALT FLD-CCNC: 52 U/L — HIGH (ref 4–41)
ANION GAP SERPL CALC-SCNC: 15 MMOL/L — HIGH (ref 7–14)
AST SERPL-CCNC: 121 U/L — HIGH (ref 4–40)
BILIRUB SERPL-MCNC: 25.2 MG/DL — HIGH (ref 0.2–1.2)
BUN SERPL-MCNC: 44 MG/DL — HIGH (ref 7–23)
C PEPTIDE SERPL-MCNC: 2.2 NG/ML — SIGNIFICANT CHANGE UP (ref 1.1–4.4)
CALCIUM SERPL-MCNC: 9.1 MG/DL — SIGNIFICANT CHANGE UP (ref 8.4–10.5)
CHLORIDE SERPL-SCNC: 103 MMOL/L — SIGNIFICANT CHANGE UP (ref 98–107)
CO2 SERPL-SCNC: 20 MMOL/L — LOW (ref 22–31)
CREAT SERPL-MCNC: 1.65 MG/DL — HIGH (ref 0.5–1.3)
EGFR: 46 ML/MIN/1.73M2 — LOW
GLUCOSE BLDC GLUCOMTR-MCNC: 137 MG/DL — HIGH (ref 70–99)
GLUCOSE BLDC GLUCOMTR-MCNC: 162 MG/DL — HIGH (ref 70–99)
GLUCOSE BLDC GLUCOMTR-MCNC: 165 MG/DL — HIGH (ref 70–99)
GLUCOSE BLDC GLUCOMTR-MCNC: 165 MG/DL — HIGH (ref 70–99)
GLUCOSE BLDC GLUCOMTR-MCNC: 195 MG/DL — HIGH (ref 70–99)
GLUCOSE BLDC GLUCOMTR-MCNC: 215 MG/DL — HIGH (ref 70–99)
GLUCOSE BLDC GLUCOMTR-MCNC: 219 MG/DL — HIGH (ref 70–99)
GLUCOSE SERPL-MCNC: 162 MG/DL — HIGH (ref 70–99)
HCT VFR BLD CALC: 34.7 % — LOW (ref 39–50)
HGB BLD-MCNC: 12.5 G/DL — LOW (ref 13–17)
MAGNESIUM SERPL-MCNC: 2.4 MG/DL — SIGNIFICANT CHANGE UP (ref 1.6–2.6)
MCHC RBC-ENTMCNC: 30.8 PG — SIGNIFICANT CHANGE UP (ref 27–34)
MCHC RBC-ENTMCNC: 36 GM/DL — SIGNIFICANT CHANGE UP (ref 32–36)
MCV RBC AUTO: 85.5 FL — SIGNIFICANT CHANGE UP (ref 80–100)
NRBC # BLD: 0 /100 WBCS — SIGNIFICANT CHANGE UP (ref 0–0)
NRBC # FLD: 0 K/UL — SIGNIFICANT CHANGE UP (ref 0–0)
PHOSPHATE SERPL-MCNC: 3.7 MG/DL — SIGNIFICANT CHANGE UP (ref 2.5–4.5)
PLATELET # BLD AUTO: 249 K/UL — SIGNIFICANT CHANGE UP (ref 150–400)
POTASSIUM SERPL-MCNC: 3.8 MMOL/L — SIGNIFICANT CHANGE UP (ref 3.5–5.3)
POTASSIUM SERPL-SCNC: 3.8 MMOL/L — SIGNIFICANT CHANGE UP (ref 3.5–5.3)
PROT SERPL-MCNC: 6.6 G/DL — SIGNIFICANT CHANGE UP (ref 6–8.3)
RBC # BLD: 4.06 M/UL — LOW (ref 4.2–5.8)
RBC # FLD: 23.3 % — HIGH (ref 10.3–14.5)
SODIUM SERPL-SCNC: 138 MMOL/L — SIGNIFICANT CHANGE UP (ref 135–145)
WBC # BLD: 12.58 K/UL — HIGH (ref 3.8–10.5)
WBC # FLD AUTO: 12.58 K/UL — HIGH (ref 3.8–10.5)

## 2024-05-27 PROCEDURE — 74183 MRI ABD W/O CNTR FLWD CNTR: CPT | Mod: 26

## 2024-05-27 PROCEDURE — 99232 SBSQ HOSP IP/OBS MODERATE 35: CPT

## 2024-05-27 RX ORDER — SODIUM CHLORIDE 9 MG/ML
1000 INJECTION, SOLUTION INTRAVENOUS
Refills: 0 | Status: DISCONTINUED | OUTPATIENT
Start: 2024-05-27 | End: 2024-05-27

## 2024-05-27 RX ORDER — SODIUM CHLORIDE 9 MG/ML
1000 INJECTION INTRAMUSCULAR; INTRAVENOUS; SUBCUTANEOUS
Refills: 0 | Status: DISCONTINUED | OUTPATIENT
Start: 2024-05-27 | End: 2024-05-28

## 2024-05-27 RX ORDER — CALAMINE AND ZINC OXIDE AND PHENOL 160; 10 MG/ML; MG/ML
1 LOTION TOPICAL THREE TIMES A DAY
Refills: 0 | Status: DISCONTINUED | OUTPATIENT
Start: 2024-05-27 | End: 2024-05-31

## 2024-05-27 RX ADMIN — SODIUM CHLORIDE 75 MILLILITER(S): 9 INJECTION INTRAMUSCULAR; INTRAVENOUS; SUBCUTANEOUS at 15:48

## 2024-05-27 RX ADMIN — HEPARIN SODIUM 5000 UNIT(S): 5000 INJECTION INTRAVENOUS; SUBCUTANEOUS at 05:47

## 2024-05-27 RX ADMIN — Medication 25 MILLIGRAM(S): at 00:00

## 2024-05-27 RX ADMIN — Medication 650 MILLIGRAM(S): at 18:01

## 2024-05-27 RX ADMIN — Medication 2: at 11:52

## 2024-05-27 RX ADMIN — INSULIN GLARGINE 6 UNIT(S): 100 INJECTION, SOLUTION SUBCUTANEOUS at 23:33

## 2024-05-27 RX ADMIN — CALAMINE AND ZINC OXIDE AND PHENOL 1 APPLICATION(S): 160; 10 LOTION TOPICAL at 01:01

## 2024-05-27 RX ADMIN — Medication 1: at 18:07

## 2024-05-27 RX ADMIN — Medication 25 MILLIGRAM(S): at 17:59

## 2024-05-27 RX ADMIN — URSODIOL 250 MILLIGRAM(S): 250 TABLET, FILM COATED ORAL at 17:59

## 2024-05-27 RX ADMIN — CALAMINE AND ZINC OXIDE AND PHENOL 1 APPLICATION(S): 160; 10 LOTION TOPICAL at 08:40

## 2024-05-27 RX ADMIN — Medication 25 MILLIGRAM(S): at 08:31

## 2024-05-27 RX ADMIN — URSODIOL 250 MILLIGRAM(S): 250 TABLET, FILM COATED ORAL at 05:47

## 2024-05-27 RX ADMIN — LIDOCAINE 1 PATCH: 4 CREAM TOPICAL at 00:30

## 2024-05-27 RX ADMIN — HEPARIN SODIUM 5000 UNIT(S): 5000 INJECTION INTRAVENOUS; SUBCUTANEOUS at 23:34

## 2024-05-27 RX ADMIN — SODIUM CHLORIDE 120 MILLILITER(S): 9 INJECTION, SOLUTION INTRAVENOUS at 11:49

## 2024-05-27 NOTE — PROGRESS NOTE ADULT - ASSESSMENT
63M PMH HTN, T2DM (on insulin), hemorrhoid, hyperthyroidism, asthma, GERD, and RAMANA (not on CPAP), surgical history of open cholecystectomy 15 years ago and appendectomy in 1975, p/w 6 weeks of progressive fatigue, painless jaundice, malodorous loose stools, worsening glycemic control, weight loss, pruritus who was found to have a pancreatic head/uncinate neck mass with upstream pancreatic and biliary ductal dilatation with elevated tumor CA-19-9 and CEA concerning for pancreatic adenocarcinoma, bx confirms diagnosis.  S/p ERCP and stent. bx c/w pancreatic adenocarcinoma     Plan:   - F/U MRCP  - T bili remains elevated, continue to trend  - No surgical intervention at this time, patient will need to be presented at [facility] to discuss neoadjuvant treatment      E Team Surgery  [id number]

## 2024-05-27 NOTE — PROGRESS NOTE ADULT - SUBJECTIVE AND OBJECTIVE BOX
Surg/Onc Daily Resident Progress Note    OVERNIGHT:  No acute events.     SUBJECTIVE: Pt seen and examined at bedside without complains.     OBJECTIVE:  Vital Signs Last 24 Hrs  T(C): 36.4 (27 May 2024 08:39), Max: 36.8 (26 May 2024 20:12)  T(F): 97.5 (27 May 2024 08:39), Max: 98.2 (26 May 2024 20:12)  HR: 59 (27 May 2024 08:39) (55 - 60)  BP: 110/70 (27 May 2024 09:17) (91/72 - 122/62)  BP(mean): --  RR: 18 (27 May 2024 08:39) (17 - 18)  SpO2: 98% (27 May 2024 08:39) (98% - 100%)    Parameters below as of 27 May 2024 08:39  Patient On (Oxygen Delivery Method): room air        Physical Exam:  Gen: Laying in bed, NAD  HEENT: atraumatic, hiccuping frequently  Resp: Unlabored breathing  Abd: soft, nontender, nondistended  Ext: Moves 4 extremities spontaneously      dextrose 10% Bolus milliLiter(s)  dextrose 5%. milliLiter(s) (50 mL/Hr)  dextrose 5%. milliLiter(s) (100 mL/Hr)      Medications:  MEDICATIONS  (STANDING):  atenolol  Tablet 50 milliGRAM(s) Oral daily  cholestyramine Powder (Sugar-Free) 4 Gram(s) Oral daily  dextrose 10% Bolus 125 milliLiter(s) IV Bolus once  dextrose 5%. 1000 milliLiter(s) (50 mL/Hr) IV Continuous <Continuous>  dextrose 5%. 1000 milliLiter(s) (100 mL/Hr) IV Continuous <Continuous>  dextrose 50% Injectable 12.5 Gram(s) IV Push once  dextrose 50% Injectable 25 Gram(s) IV Push once  famotidine    Tablet 40 milliGRAM(s) Oral daily  glucagon  Injectable 1 milliGRAM(s) IntraMuscular once  heparin   Injectable 5000 Unit(s) SubCutaneous every 8 hours  insulin glargine Injectable (LANTUS) 6 Unit(s) SubCutaneous at bedtime  insulin lispro (ADMELOG) corrective regimen sliding scale   SubCutaneous three times a day before meals  insulin lispro (ADMELOG) corrective regimen sliding scale   SubCutaneous at bedtime  lidocaine   4% Patch 1 Patch Transdermal daily  polyethylene glycol 3350 17 Gram(s) Oral daily  senna 2 Tablet(s) Oral at bedtime  ursodiol Tablet 250 milliGRAM(s) Oral two times a day    MEDICATIONS  (PRN):  acetaminophen     Tablet .. 650 milliGRAM(s) Oral every 6 hours PRN Temp greater or equal to 38C (100.4F), Mild Pain (1 - 3), Moderate Pain (4 - 6), Severe Pain (7 - 10)  albuterol/ipratropium for Nebulization 3 milliLiter(s) Nebulizer every 6 hours PRN Shortness of Breath and/or Wheezing  calamine/zinc oxide Lotion 1 Application(s) Topical three times a day PRN Itching  dextrose Oral Gel 15 Gram(s) Oral once PRN Blood Glucose LESS THAN 70 milliGRAM(s)/deciliter  hydrOXYzine hydrochloride 25 milliGRAM(s) Oral every 8 hours PRN Itching  petrolatum white Ointment 1 Application(s) Topical every 6 hours PRN Dry lips    Allergies:  No Known Allergies      Labs:  05-27    138  |  103  |  44<H>  ----------------------------<  162<H>  3.8   |  20<L>  |  1.65<H>    Ca    9.1      27 May 2024 05:41  Phos  3.7     05-27  Mg     2.40     05-27    TPro  6.6  /  Alb  2.5<L>  /  TBili  25.2<H>  /  DBili  x   /  AST  121<H>  /  ALT  52<H>  /  AlkPhos  582<H>  05-27                          12.5   12.58 )-----------( 249      ( 27 May 2024 05:41 )             34.7

## 2024-05-27 NOTE — PROGRESS NOTE ADULT - SUBJECTIVE AND OBJECTIVE BOX
Chief Complaint: Type 2 DM     History: Pt seen at bedside. Pt tolerating oral diet. Pt denies nausea and vomiting/any signs of hypoglycemia. pt reports an adequate appetite.     MEDICATIONS  (STANDING):  atenolol  Tablet 50 milliGRAM(s) Oral daily  cholestyramine Powder (Sugar-Free) 4 Gram(s) Oral daily  dextrose 10% Bolus 125 milliLiter(s) IV Bolus once  dextrose 5%. 1000 milliLiter(s) (100 mL/Hr) IV Continuous <Continuous>  dextrose 5%. 1000 milliLiter(s) (50 mL/Hr) IV Continuous <Continuous>  dextrose 50% Injectable 12.5 Gram(s) IV Push once  dextrose 50% Injectable 25 Gram(s) IV Push once  famotidine    Tablet 40 milliGRAM(s) Oral daily  glucagon  Injectable 1 milliGRAM(s) IntraMuscular once  heparin   Injectable 5000 Unit(s) SubCutaneous every 8 hours  insulin glargine Injectable (LANTUS) 6 Unit(s) SubCutaneous at bedtime  insulin lispro (ADMELOG) corrective regimen sliding scale   SubCutaneous three times a day before meals  insulin lispro (ADMELOG) corrective regimen sliding scale   SubCutaneous at bedtime  lidocaine   4% Patch 1 Patch Transdermal daily  polyethylene glycol 3350 17 Gram(s) Oral daily  senna 2 Tablet(s) Oral at bedtime  sodium chloride 0.9%. 1000 milliLiter(s) (75 mL/Hr) IV Continuous <Continuous>  ursodiol Tablet 250 milliGRAM(s) Oral two times a day    MEDICATIONS  (PRN):  acetaminophen     Tablet .. 650 milliGRAM(s) Oral every 6 hours PRN Temp greater or equal to 38C (100.4F), Mild Pain (1 - 3), Moderate Pain (4 - 6), Severe Pain (7 - 10)  albuterol/ipratropium for Nebulization 3 milliLiter(s) Nebulizer every 6 hours PRN Shortness of Breath and/or Wheezing  calamine/zinc oxide Lotion 1 Application(s) Topical three times a day PRN Itching  dextrose Oral Gel 15 Gram(s) Oral once PRN Blood Glucose LESS THAN 70 milliGRAM(s)/deciliter  hydrOXYzine hydrochloride 25 milliGRAM(s) Oral every 8 hours PRN Itching  petrolatum white Ointment 1 Application(s) Topical every 6 hours PRN Dry lips      Allergies: No Known Allergies    Review of Systems:  Respiratory: No SOB, no cough  GI: No nausea, vomiting, abdominal pain  Endocrine: no polyuria, polydipsia    PHYSICAL EXAM:  VITALS: T(C): 36.6 (05-27-24 @ 13:01)  T(F): 97.8 (05-27-24 @ 13:01), Max: 98.2 (05-26-24 @ 20:12)  HR: 56 (05-27-24 @ 13:01) (55 - 59)  BP: 104/58 (05-27-24 @ 13:01) (91/72 - 122/62)  RR:  (17 - 18)  SpO2:  (98% - 100%)  Wt(kg): --  GENERAL: NAD, well-groomed, well-developed  RESPIRATORY: No labored breathing   GI: Soft, nontender, non distended  PSYCH: Alert and oriented x 3, normal affect, normal mood      CAPILLARY BLOOD GLUCOSE  POCT Blood Glucose.: 219 mg/dL (27 May 2024 11:43)  POCT Blood Glucose.: 137 mg/dL (27 May 2024 07:45)  POCT Blood Glucose.: 162 mg/dL (27 May 2024 03:36)  POCT Blood Glucose.: 215 mg/dL (26 May 2024 22:05)  POCT Blood Glucose.: 184 mg/dL (26 May 2024 17:11)    A1C with Estimated Average Glucose (05.18.24 @ 06:35)    A1C with Estimated Average Glucose Result: 9.0   Estimated Average Glucose: 212      05-27    138  |  103  |  44<H>  ----------------------------<  162<H>  3.8   |  20<L>  |  1.65<H>    eGFR: 46<L>    Ca    9.1      05-27  Mg     2.40     05-27  Phos  3.7     05-27    TPro  6.6  /  Alb  2.5<L>  /  TBili  25.2<H>  /  DBili  x   /  AST  121<H>  /  ALT  52<H>  /  AlkPhos  582<H>  05-27    Thyroid Function Tests:  05-17 @ 16:22 TSH 0.70 FreeT4 -- T3 -- Anti TPO -- Anti Thyroglobulin Ab -- TSI --    Diet, NPO (05-27-24 @ 11:32) [Active]

## 2024-05-27 NOTE — PROGRESS NOTE ADULT - ASSESSMENT
64 yo man with history of HTN, HLD, type 2 DM (on insulin), hyperthyroidism, asthma, GERD, and RAMANA (not on CPAP) presents with 2 weeks of painless jaundice, found to have  a 3.5 x 4.5 x 3.0 cm heterogenous solid mass in the pancreatic head/uncinate process with extrahepatic and intrahepatic biliary dilatation. Endocrinology consulted for assistance with management of uncontrolled DM2 w/ hyperglycemia and hypoglycemia iso newly discovered pancreatic mass.   Patient is high risk with high level decision making due to uncontrolled diabetes with A1C>9, new pancreatic mass and SOFIE which places patient at high risk for cardiovascular and cerebrovascular events. Patient with lability of glucose requiring close monitoring and insulin adjustments.    #Uncontrolled DM2 w/ hypo& hyperglycemia vs type 3 C DM  A1c 9%   newly found pancreatic mass   egfr 68 - SOFIE  higher basal/bolus insulin requirements at home but likely that po intake/diet is also much different   Recommendations:   - BG goal 100-180: above goal . pt now on dextrose IVF team now switched to Sodium chloride as pt is npo for MRI today. pt at times eats in between meals.  - Continue Lantus 6 units sq qhs ; if npo overnight would decrease lantus to 4 units sq qhs   - Continue to hold standing premeal insulin for now (pt is eating in between meals at times)  - Continue Admelog Low Correction Scale Premeal & Separate LOW Correction Scale Bedtime - change to q6h if npo   - FS Blood glucose monitoring Premeal/Bedtime - change to q6h if npo: check 3 am FS   - Hypoglycemia protocol   - Carb Consistent Diet   - Nutrition consult   - Check c-peptide in am with bmp     DC Planning: Basal/bolus insulin pens. Doses TBD closer to discharge pending insulin requirements/clinical course.  Please ensure patient has prescriptions for diabetes supplies (glucometer, test strips, lancets, alcohol swabs, insulin pen needles).   **Patient interested in CGM- Please send script for [employer] 3 sensors and reader (d/w primary team).  Routine outpatient opthalmology & podiatry evaluations recommended. Patient can follow up with endocrinology at the location provided below: D/w that he needs endocrinologist outpt and he expressed wanting to ask his PCP to recommend one but also wants our information in his discharge incase he wants to call to make an appointment.     [facility]   [address] [facility] [address]  [place] [address]  [phone]    #Hx of Hyperthyroidism  #Hx of Thyroid Nodule   - outpatient endocrine follow up  - not on Methimazole   - on atenolol   - TSH 5/17 wnl at 0.7  -Discharge on atenolol and follow up with PCP/endocrinologist to monitor TFT's and repeat them in 4-6 weeks.     #HTN  Recommendations:   - outpt BP goal < 130/80   - defer to primary team   - outpatient annual urinary microalb/cr ratio  - on atenolol     #HLD  Recommendations:   - LDL goal < 70   - defer to primary team   - outpatient fasting lipid profile   - on atorvastatin 40mg qhs     Metabolic Acidosis   defer to primary team   trend labs     d/w [id number]    Loretta Townsend  Nurse Practitioner  Division of Endocrinology & Diabetes  In house pager #[phone]    If before 9AM or after 6PM, or on weekends/holidays, please call endocrine answering service for assistance ([phone]).For nonurgent matters email [email or web address] for assistance.

## 2024-05-28 LAB
ALBUMIN SERPL ELPH-MCNC: 2.1 G/DL — LOW (ref 3.3–5)
ALP SERPL-CCNC: 474 U/L — HIGH (ref 40–120)
ALT FLD-CCNC: 42 U/L — HIGH (ref 4–41)
ANION GAP SERPL CALC-SCNC: 14 MMOL/L — SIGNIFICANT CHANGE UP (ref 7–14)
AST SERPL-CCNC: 86 U/L — HIGH (ref 4–40)
BILIRUB SERPL-MCNC: 22 MG/DL — HIGH (ref 0.2–1.2)
BUN SERPL-MCNC: 50 MG/DL — HIGH (ref 7–23)
C PEPTIDE SERPL-MCNC: 2.8 NG/ML — SIGNIFICANT CHANGE UP (ref 1.1–4.4)
CALCIUM SERPL-MCNC: 8.3 MG/DL — LOW (ref 8.4–10.5)
CHLORIDE SERPL-SCNC: 103 MMOL/L — SIGNIFICANT CHANGE UP (ref 98–107)
CO2 SERPL-SCNC: 19 MMOL/L — LOW (ref 22–31)
CREAT SERPL-MCNC: 1.63 MG/DL — HIGH (ref 0.5–1.3)
EGFR: 47 ML/MIN/1.73M2 — LOW
GLUCOSE BLDC GLUCOMTR-MCNC: 160 MG/DL — HIGH (ref 70–99)
GLUCOSE BLDC GLUCOMTR-MCNC: 171 MG/DL — HIGH (ref 70–99)
GLUCOSE BLDC GLUCOMTR-MCNC: 189 MG/DL — HIGH (ref 70–99)
GLUCOSE BLDC GLUCOMTR-MCNC: 218 MG/DL — HIGH (ref 70–99)
GLUCOSE SERPL-MCNC: 207 MG/DL — HIGH (ref 70–99)
HCT VFR BLD CALC: 28.7 % — LOW (ref 39–50)
HGB BLD-MCNC: 10.2 G/DL — LOW (ref 13–17)
MAGNESIUM SERPL-MCNC: 2.2 MG/DL — SIGNIFICANT CHANGE UP (ref 1.6–2.6)
MCHC RBC-ENTMCNC: 30.6 PG — SIGNIFICANT CHANGE UP (ref 27–34)
MCHC RBC-ENTMCNC: 35.5 GM/DL — SIGNIFICANT CHANGE UP (ref 32–36)
MCV RBC AUTO: 86.2 FL — SIGNIFICANT CHANGE UP (ref 80–100)
NRBC # BLD: 0 /100 WBCS — SIGNIFICANT CHANGE UP (ref 0–0)
NRBC # FLD: 0 K/UL — SIGNIFICANT CHANGE UP (ref 0–0)
PHOSPHATE SERPL-MCNC: 3.7 MG/DL — SIGNIFICANT CHANGE UP (ref 2.5–4.5)
PLATELET # BLD AUTO: 222 K/UL — SIGNIFICANT CHANGE UP (ref 150–400)
POTASSIUM SERPL-MCNC: 4.2 MMOL/L — SIGNIFICANT CHANGE UP (ref 3.5–5.3)
POTASSIUM SERPL-SCNC: 4.2 MMOL/L — SIGNIFICANT CHANGE UP (ref 3.5–5.3)
PROT SERPL-MCNC: 5.6 G/DL — LOW (ref 6–8.3)
RBC # BLD: 3.33 M/UL — LOW (ref 4.2–5.8)
RBC # FLD: 22.7 % — HIGH (ref 10.3–14.5)
SODIUM SERPL-SCNC: 136 MMOL/L — SIGNIFICANT CHANGE UP (ref 135–145)
WBC # BLD: 11.29 K/UL — HIGH (ref 3.8–10.5)
WBC # FLD AUTO: 11.29 K/UL — HIGH (ref 3.8–10.5)

## 2024-05-28 PROCEDURE — 99232 SBSQ HOSP IP/OBS MODERATE 35: CPT

## 2024-05-28 PROCEDURE — 74018 RADEX ABDOMEN 1 VIEW: CPT | Mod: 26

## 2024-05-28 RX ORDER — HEPARIN SODIUM 5000 [USP'U]/ML
5000 INJECTION INTRAVENOUS; SUBCUTANEOUS EVERY 8 HOURS
Refills: 0 | Status: COMPLETED | OUTPATIENT
Start: 2024-05-28 | End: 2024-05-28

## 2024-05-28 RX ORDER — INSULIN LISPRO 100/ML
VIAL (ML) SUBCUTANEOUS
Refills: 0 | Status: DISCONTINUED | OUTPATIENT
Start: 2024-05-28 | End: 2024-05-31

## 2024-05-28 RX ORDER — HEPARIN SODIUM 5000 [USP'U]/ML
5000 INJECTION INTRAVENOUS; SUBCUTANEOUS EVERY 8 HOURS
Refills: 0 | Status: DISCONTINUED | OUTPATIENT
Start: 2024-05-28 | End: 2024-05-28

## 2024-05-28 RX ORDER — SODIUM CHLORIDE 9 MG/ML
1000 INJECTION INTRAMUSCULAR; INTRAVENOUS; SUBCUTANEOUS
Refills: 0 | Status: DISCONTINUED | OUTPATIENT
Start: 2024-05-28 | End: 2024-05-28

## 2024-05-28 RX ORDER — INSULIN GLARGINE 100 [IU]/ML
5 INJECTION, SOLUTION SUBCUTANEOUS AT BEDTIME
Refills: 0 | Status: DISCONTINUED | OUTPATIENT
Start: 2024-05-28 | End: 2024-05-29

## 2024-05-28 RX ORDER — INSULIN LISPRO 100/ML
VIAL (ML) SUBCUTANEOUS
Refills: 0 | Status: DISCONTINUED | OUTPATIENT
Start: 2024-05-28 | End: 2024-05-28

## 2024-05-28 RX ORDER — SODIUM CHLORIDE 9 MG/ML
500 INJECTION INTRAMUSCULAR; INTRAVENOUS; SUBCUTANEOUS ONCE
Refills: 0 | Status: DISCONTINUED | OUTPATIENT
Start: 2024-05-28 | End: 2024-05-28

## 2024-05-28 RX ORDER — SODIUM CHLORIDE 9 MG/ML
500 INJECTION INTRAMUSCULAR; INTRAVENOUS; SUBCUTANEOUS ONCE
Refills: 0 | Status: COMPLETED | OUTPATIENT
Start: 2024-05-28 | End: 2024-05-28

## 2024-05-28 RX ORDER — SODIUM CHLORIDE 9 MG/ML
1000 INJECTION, SOLUTION INTRAVENOUS
Refills: 0 | Status: DISCONTINUED | OUTPATIENT
Start: 2024-05-28 | End: 2024-05-29

## 2024-05-28 RX ORDER — INSULIN GLARGINE 100 [IU]/ML
7 INJECTION, SOLUTION SUBCUTANEOUS AT BEDTIME
Refills: 0 | Status: DISCONTINUED | OUTPATIENT
Start: 2024-05-28 | End: 2024-05-28

## 2024-05-28 RX ORDER — INSULIN LISPRO 100/ML
VIAL (ML) SUBCUTANEOUS EVERY 6 HOURS
Refills: 0 | Status: DISCONTINUED | OUTPATIENT
Start: 2024-05-28 | End: 2024-05-28

## 2024-05-28 RX ORDER — INSULIN GLARGINE 100 [IU]/ML
4 INJECTION, SOLUTION SUBCUTANEOUS AT BEDTIME
Refills: 0 | Status: DISCONTINUED | OUTPATIENT
Start: 2024-05-28 | End: 2024-05-28

## 2024-05-28 RX ADMIN — CHOLESTYRAMINE 4 GRAM(S): 4 POWDER, FOR SUSPENSION ORAL at 09:47

## 2024-05-28 RX ADMIN — Medication 1: at 17:14

## 2024-05-28 RX ADMIN — HEPARIN SODIUM 5000 UNIT(S): 5000 INJECTION INTRAVENOUS; SUBCUTANEOUS at 14:09

## 2024-05-28 RX ADMIN — FAMOTIDINE 40 MILLIGRAM(S): 10 INJECTION INTRAVENOUS at 11:32

## 2024-05-28 RX ADMIN — URSODIOL 250 MILLIGRAM(S): 250 TABLET, FILM COATED ORAL at 17:15

## 2024-05-28 RX ADMIN — URSODIOL 250 MILLIGRAM(S): 250 TABLET, FILM COATED ORAL at 07:11

## 2024-05-28 RX ADMIN — SENNA PLUS 2 TABLET(S): 8.6 TABLET ORAL at 21:56

## 2024-05-28 RX ADMIN — SODIUM CHLORIDE 1000 MILLILITER(S): 9 INJECTION INTRAMUSCULAR; INTRAVENOUS; SUBCUTANEOUS at 09:46

## 2024-05-28 RX ADMIN — Medication 1: at 11:39

## 2024-05-28 RX ADMIN — CALAMINE AND ZINC OXIDE AND PHENOL 1 APPLICATION(S): 160; 10 LOTION TOPICAL at 00:43

## 2024-05-28 RX ADMIN — SODIUM CHLORIDE 75 MILLILITER(S): 9 INJECTION, SOLUTION INTRAVENOUS at 16:06

## 2024-05-28 RX ADMIN — INSULIN GLARGINE 5 UNIT(S): 100 INJECTION, SOLUTION SUBCUTANEOUS at 21:56

## 2024-05-28 RX ADMIN — SODIUM CHLORIDE 75 MILLILITER(S): 9 INJECTION INTRAMUSCULAR; INTRAVENOUS; SUBCUTANEOUS at 09:46

## 2024-05-28 NOTE — PROGRESS NOTE ADULT - ASSESSMENT
62 yo man with history of HTN, HLD, type 2 DM (on insulin), hyperthyroidism, asthma, GERD, and RAMANA (not on CPAP) presents with 2 weeks of painless jaundice, found to have  a 3.5 x 4.5 x 3.0 cm heterogenous solid mass in the pancreatic head/uncinate process with extrahepatic and intrahepatic biliary dilatation. Endocrinology consulted for assistance with management of uncontrolled DM2 w/ hyperglycemia and hypoglycemia iso newly discovered pancreatic mass.   Patient is high risk with high level decision making due to uncontrolled diabetes with A1C>9, new pancreatic mass and SOFIE which places patient at high risk for cardiovascular and cerebrovascular events. Patient with lability of glucose requiring close monitoring and insulin adjustments.    #Uncontrolled DM2 w/ hypo& hyperglycemia vs type 3 C DM  A1c 9%   newly found pancreatic mass   egfr 68 - SOFIE  higher basal/bolus insulin requirements at home but likely that po intake/diet is also much different   Recommendations:   - BG goal 100-180:   -5/28 pt was made NPO for possible procedure. FS this morning was 189 and this afternoon 160. Confirmed with primary team that pt will no be going for procedure today or tomorrow now and diet will be advanced. pt has not been eating much, PO take fair.   - Increase Lantus to 7 units sq qhs ; if npo overnight would decrease lantus to 5 units sq qhs   - Continue to hold standing premeal insulin for now (pt is eating in between meals at times)  - Continue Admelog Low Correction Scale Premeal & Separate LOW Correction Scale Bedtime - change to q6h if npo   - FS Blood glucose monitoring Premeal/Bedtime - change to q6h if npo  - Hypoglycemia protocol   - Carb Consistent Diet   - Nutrition consult   - Check c-peptide in am with bmp     DC Planning: Basal/bolus insulin pens. Doses TBD closer to discharge pending insulin requirements/clinical course.  Please ensure patient has prescriptions for diabetes supplies (glucometer, test strips, lancets, alcohol swabs, insulin pen needles).   **Patient interested in CGM- Please send script for [employer] 3 sensors and reader (d/w primary team).  Routine outpatient opthalmology & podiatry evaluations recommended. Patient can follow up with endocrinology at the location provided below: D/w that he needs endocrinologist outpt and he expressed wanting to ask his PCP to recommend one but also wants our information in his discharge incase he wants to call to make an appointment.     [facility]   [address]  [place] [address]  [phone]    #Hx of Hyperthyroidism  #Hx of Thyroid Nodule   - outpatient endocrine follow up  - not on Methimazole   - on atenolol   - TSH 5/17 wnl at 0.7  -Discharge on atenolol and follow up with PCP/endocrinologist to monitor TFT's and repeat them in 4-6 weeks.     #HTN  Recommendations:   - outpt BP goal < 130/80   - defer to primary team   - outpatient annual urinary microalb/cr ratio  - on atenolol     #HLD  Recommendations:   - LDL goal < 70   - defer to primary team   - outpatient fasting lipid profile   - on atorvastatin 40mg qhs     Metabolic Acidosis   defer to primary team   trend labs     d/w primary team EIND Pineda.     If before 9AM or after 6PM, or on weekends/holidays, please call endocrine answering service for assistance ([phone]).For nonurgent matters email [email or web address] for assistance.

## 2024-05-28 NOTE — PROGRESS NOTE ADULT - ASSESSMENT
63M PMH HTN, T2DM (on insulin), hemorrhoid, hyperthyroidism, asthma, GERD, and RAMANA (not on CPAP), surgical history of open cholecystectomy 15 years ago and appendectomy in 1975, p/w 6 weeks of progressive fatigue, painless jaundice, malodorous loose stools, worsening glycemic control, weight loss, pruritus who was found to have a pancreatic head/uncinate neck mass with upstream pancreatic and biliary ductal dilatation with elevated tumor CA-19-9 and CEA concerning for pancreatic adenocarcinoma, bx confirms diagnosis.  S/p ERCP and stent. bx c/w pancreatic adenocarcinoma     Plan:   - MRCP completed --> f/u official read   - Trending Tbili  - Endocrine following - appreciate recs  - Lantus 6u --> 4u while NPO  - JARRETT  - Diet: NPO   - f/u with GI plan       E Team Surgery  [id number]

## 2024-05-28 NOTE — PROGRESS NOTE ADULT - SUBJECTIVE AND OBJECTIVE BOX
Chief Complaint: type 2 dm    History: saw pt at bedside. pt was made NPO for possible procedure. FS this morning was 189 and this afternoon 160. Confirmed with primary team that pt will no be going for procedure today or tomorrow now and diet will be advanced. pt has not been eating much, PO take fair.     MEDICATIONS  (STANDING):  atenolol  Tablet 50 milliGRAM(s) Oral daily  cholestyramine Powder (Sugar-Free) 4 Gram(s) Oral daily  dextrose 10% Bolus 125 milliLiter(s) IV Bolus once  dextrose 5%. 1000 milliLiter(s) (50 mL/Hr) IV Continuous <Continuous>  dextrose 5%. 1000 milliLiter(s) (100 mL/Hr) IV Continuous <Continuous>  dextrose 50% Injectable 25 Gram(s) IV Push once  dextrose 50% Injectable 12.5 Gram(s) IV Push once  famotidine    Tablet 40 milliGRAM(s) Oral daily  heparin   Injectable 5000 Unit(s) SubCutaneous every 8 hours  insulin glargine Injectable (LANTUS) 7 Unit(s) SubCutaneous at bedtime  insulin lispro (ADMELOG) corrective regimen sliding scale   SubCutaneous three times a day before meals  insulin lispro (ADMELOG) corrective regimen sliding scale   SubCutaneous at bedtime  lidocaine   4% Patch 1 Patch Transdermal daily  polyethylene glycol 3350 17 Gram(s) Oral daily  senna 2 Tablet(s) Oral at bedtime  sodium chloride 0.9%. 1000 milliLiter(s) (75 mL/Hr) IV Continuous <Continuous>  ursodiol Tablet 250 milliGRAM(s) Oral two times a day    MEDICATIONS  (PRN):  acetaminophen     Tablet .. 650 milliGRAM(s) Oral every 6 hours PRN Temp greater or equal to 38C (100.4F), Mild Pain (1 - 3), Moderate Pain (4 - 6), Severe Pain (7 - 10)  albuterol/ipratropium for Nebulization 3 milliLiter(s) Nebulizer every 6 hours PRN Shortness of Breath and/or Wheezing  calamine/zinc oxide Lotion 1 Application(s) Topical three times a day PRN Itching  dextrose Oral Gel 15 Gram(s) Oral once PRN Blood Glucose LESS THAN 70 milliGRAM(s)/deciliter  hydrOXYzine hydrochloride 25 milliGRAM(s) Oral every 8 hours PRN Itching  petrolatum white Ointment 1 Application(s) Topical every 6 hours PRN Dry lips      Allergies    No Known Allergies    Intolerances      Review of Systems:  Cardiovascular: No chest pain, palpitations  Respiratory: No SOB, no cough  GI: No nausea, vomiting, abdominal pain  : No dysuria  Endocrine: no polyuria, polydipsia    PHYSICAL EXAM:  VITALS: T(C): 36.9 (05-28-24 @ 12:02)  T(F): 98.4 (05-28-24 @ 12:02), Max: 98.4 (05-28-24 @ 12:02)  HR: 55 (05-28-24 @ 12:02) (54 - 59)  BP: 104/63 (05-28-24 @ 12:02) (97/57 - 108/60)  RR:  (18 - 18)  SpO2:  (97% - 100%)  Wt(kg): --  GENERAL: NAD, well-groomed, well-developed  EYES: No proptosis  HEENT:  Atraumatic, Normocephalic  RESPIRATORY: non labored breathing   CARDIOVASCULAR: Regular rate and rhythm  GI: Soft, nontender, non distended  PSYCH: Alert and oriented x 3      CAPILLARY BLOOD GLUCOSE      POCT Blood Glucose.: 160 mg/dL (28 May 2024 11:36)  POCT Blood Glucose.: 189 mg/dL (28 May 2024 07:39)  POCT Blood Glucose.: 215 mg/dL (27 May 2024 23:32)  POCT Blood Glucose.: 165 mg/dL (27 May 2024 21:05)  POCT Blood Glucose.: 165 mg/dL (27 May 2024 17:58)  POCT Blood Glucose.: 195 mg/dL (27 May 2024 16:39)      05-28    136  |  103  |  50<H>  ----------------------------<  207<H>  4.2   |  19<L>  |  1.63<H>    eGFR: 47<L>    Ca    8.3<L>      05-28  Mg     2.20     05-28  Phos  3.7     05-28    TPro  5.6<L>  /  Alb  2.1<L>  /  TBili  22.0<H>  /  DBili  x   /  AST  86<H>  /  ALT  42<H>  /  AlkPhos  474<H>  05-28          Thyroid Function Tests:  05-17 @ 16:22 TSH 0.70 FreeT4 -- T3 -- Anti TPO -- Anti Thyroglobulin Ab -- TSI --        A1C with Estimated Average Glucose Result: 9.0 % (05-18-24 @ 06:35)          Diet, DASH/TLC:   Sodium & Cholesterol Restricted  Consistent Carbohydrate No Snacks (CSTCHO)  No Pork (05-28-24 @ 13:49)

## 2024-05-28 NOTE — PROGRESS NOTE ADULT - SUBJECTIVE AND OBJECTIVE BOX
D/E TEAM Surgery Progress Note  Patient is a 63y old  Male who presents with a chief complaint of Jaundice, pancreatic mass (27 May 2024 16:11)    SUBJECTIVE: Patient seen and examined at bedside with surgical team, patient without complaints.    OBJECTIVE:    Vital Signs Last 24 Hrs  T(C): 36.4 (28 May 2024 05:37), Max: 36.7 (28 May 2024 00:52)  T(F): 97.6 (28 May 2024 05:37), Max: 98 (28 May 2024 00:52)  HR: 57 (28 May 2024 05:37) (54 - 59)  BP: 108/60 (28 May 2024 05:37) (91/72 - 110/70)  BP(mean): --  RR: 18 (28 May 2024 05:37) (18 - 18)  SpO2: 97% (28 May 2024 05:37) (97% - 100%)    Parameters below as of 28 May 2024 05:37  Patient On (Oxygen Delivery Method): room air    I&O's Detail    26 May 2024 07:01  -  27 May 2024 07:00  --------------------------------------------------------  IN:    Oral Fluid: 1610 mL  Total IN: 1610 mL    OUT:  Total OUT: 0 mL    Total NET: 1610 mL      27 May 2024 07:01  -  28 May 2024 06:43  --------------------------------------------------------  IN:    Oral Fluid: 1150 mL    sodium chloride 0.9%: 675 mL  Total IN: 1825 mL    OUT:    Voided (mL): 700 mL  Total OUT: 700 mL    Total NET: 1125 mL      MEDICATIONS  (STANDING):  atenolol  Tablet 50 milliGRAM(s) Oral daily  cholestyramine Powder (Sugar-Free) 4 Gram(s) Oral daily  dextrose 10% Bolus 125 milliLiter(s) IV Bolus once  dextrose 5%. 1000 milliLiter(s) (100 mL/Hr) IV Continuous <Continuous>  dextrose 5%. 1000 milliLiter(s) (50 mL/Hr) IV Continuous <Continuous>  dextrose 50% Injectable 12.5 Gram(s) IV Push once  dextrose 50% Injectable 25 Gram(s) IV Push once  famotidine    Tablet 40 milliGRAM(s) Oral daily  glucagon  Injectable 1 milliGRAM(s) IntraMuscular once  insulin glargine Injectable (LANTUS) 6 Unit(s) SubCutaneous at bedtime  insulin lispro (ADMELOG) corrective regimen sliding scale   SubCutaneous three times a day before meals  insulin lispro (ADMELOG) corrective regimen sliding scale   SubCutaneous at bedtime  lidocaine   4% Patch 1 Patch Transdermal daily  polyethylene glycol 3350 17 Gram(s) Oral daily  senna 2 Tablet(s) Oral at bedtime  sodium chloride 0.9%. 1000 milliLiter(s) (75 mL/Hr) IV Continuous <Continuous>  ursodiol Tablet 250 milliGRAM(s) Oral two times a day    MEDICATIONS  (PRN):  acetaminophen     Tablet .. 650 milliGRAM(s) Oral every 6 hours PRN Temp greater or equal to 38C (100.4F), Mild Pain (1 - 3), Moderate Pain (4 - 6), Severe Pain (7 - 10)  albuterol/ipratropium for Nebulization 3 milliLiter(s) Nebulizer every 6 hours PRN Shortness of Breath and/or Wheezing  calamine/zinc oxide Lotion 1 Application(s) Topical three times a day PRN Itching  dextrose Oral Gel 15 Gram(s) Oral once PRN Blood Glucose LESS THAN 70 milliGRAM(s)/deciliter  hydrOXYzine hydrochloride 25 milliGRAM(s) Oral every 8 hours PRN Itching  petrolatum white Ointment 1 Application(s) Topical every 6 hours PRN Dry lips      PHYSICAL EXAM:  Gen: Laying in bed, NAD  HEENT: atraumatic  Resp: Unlabored breathing, normal CW expansion  Abd: soft, nontender, nondistended  Ext: Moves 4 extremities spontaneously      LABS:                        12.5   12.58 )-----------( 249      ( 27 May 2024 05:41 )             34.7     05-27    138  |  103  |  44<H>  ----------------------------<  162<H>  3.8   |  20<L>  |  1.65<H>    Ca    9.1      27 May 2024 05:41  Phos  3.7     05-27  Mg     2.40     05-27    TPro  6.6  /  Alb  2.5<L>  /  TBili  25.2<H>  /  DBili  x   /  AST  121<H>  /  ALT  52<H>  /  AlkPhos  582<H>  05-27      LIVER FUNCTIONS - ( 27 May 2024 05:41 )  Alb: 2.5 g/dL / Pro: 6.6 g/dL / ALK PHOS: 582 U/L / ALT: 52 U/L / AST: 121 U/L / GGT: x           Urinalysis Basic - ( 27 May 2024 05:41 )    Color: x / Appearance: x / SG: x / pH: x  Gluc: 162 mg/dL / Ketone: x  / Bili: x / Urobili: x   Blood: x / Protein: x / Nitrite: x   Leuk Esterase: x / RBC: x / WBC x   Sq Epi: x / Non Sq Epi: x / Bacteria: x           D/E TEAM Surgery Progress Note  Patient is a 63y old  Male who presents with a chief complaint of Jaundice, pancreatic mass (27 May 2024 16:11)    SUBJECTIVE: Patient seen and examined at bedside with surgical team, patient without complaints.    OBJECTIVE:    Vital Signs Last 24 Hrs  T(C): 36.4 (28 May 2024 05:37), Max: 36.7 (28 May 2024 00:52)  T(F): 97.6 (28 May 2024 05:37), Max: 98 (28 May 2024 00:52)  HR: 57 (28 May 2024 05:37) (54 - 59)  BP: 108/60 (28 May 2024 05:37) (91/72 - 110/70)  BP(mean): --  RR: 18 (28 May 2024 05:37) (18 - 18)  SpO2: 97% (28 May 2024 05:37) (97% - 100%)    Parameters below as of 28 May 2024 05:37  Patient On (Oxygen Delivery Method): room air    I&O's Detail    26 May 2024 07:01  -  27 May 2024 07:00  --------------------------------------------------------  IN:    Oral Fluid: 1610 mL  Total IN: 1610 mL    OUT:  Total OUT: 0 mL    Total NET: 1610 mL      27 May 2024 07:01  -  28 May 2024 06:43  --------------------------------------------------------  IN:    Oral Fluid: 1150 mL    sodium chloride 0.9%: 675 mL  Total IN: 1825 mL    OUT:    Voided (mL): 700 mL  Total OUT: 700 mL    Total NET: 1125 mL      MEDICATIONS  (STANDING):  atenolol  Tablet 50 milliGRAM(s) Oral daily  cholestyramine Powder (Sugar-Free) 4 Gram(s) Oral daily  dextrose 10% Bolus 125 milliLiter(s) IV Bolus once  dextrose 5%. 1000 milliLiter(s) (100 mL/Hr) IV Continuous <Continuous>  dextrose 5%. 1000 milliLiter(s) (50 mL/Hr) IV Continuous <Continuous>  dextrose 50% Injectable 12.5 Gram(s) IV Push once  dextrose 50% Injectable 25 Gram(s) IV Push once  famotidine    Tablet 40 milliGRAM(s) Oral daily  glucagon  Injectable 1 milliGRAM(s) IntraMuscular once  insulin glargine Injectable (LANTUS) 6 Unit(s) SubCutaneous at bedtime  insulin lispro (ADMELOG) corrective regimen sliding scale   SubCutaneous three times a day before meals  insulin lispro (ADMELOG) corrective regimen sliding scale   SubCutaneous at bedtime  lidocaine   4% Patch 1 Patch Transdermal daily  polyethylene glycol 3350 17 Gram(s) Oral daily  senna 2 Tablet(s) Oral at bedtime   sodium chloride 0.9%. 1000 milliLiter(s) (75 mL/Hr) IV Continuous <Continuous>  ursodiol Tablet 250 milliGRAM(s) Oral two times a day    MEDICATIONS  (PRN):  acetaminophen     Tablet .. 650 milliGRAM(s) Oral every 6 hours PRN Temp greater or equal to 38C (100.4F), Mild Pain (1 - 3), Moderate Pain (4 - 6), Severe Pain (7 - 10)  albuterol/ipratropium for Nebulization 3 milliLiter(s) Nebulizer every 6 hours PRN Shortness of Breath and/or Wheezing  calamine/zinc oxide Lotion 1 Application(s) Topical three times a day PRN Itching  dextrose Oral Gel 15 Gram(s) Oral once PRN Blood Glucose LESS THAN 70 milliGRAM(s)/deciliter  hydrOXYzine hydrochloride 25 milliGRAM(s) Oral every 8 hours PRN Itching  petrolatum white Ointment 1 Application(s) Topical every 6 hours PRN Dry lips      PHYSICAL EXAM:  Gen: Laying in bed, NAD  HEENT: atraumatic  Resp: Unlabored breathing, normal CW expansion  Abd: soft, nontender, nondistended  Ext: Moves 4 extremities spontaneously      LABS:                        12.5   12.58 )-----------( 249      ( 27 May 2024 05:41 )             34.7     05-27    138  |  103  |  44<H>  ----------------------------<  162<H>  3.8   |  20<L>  |  1.65<H>    Ca    9.1      27 May 2024 05:41  Phos  3.7     05-27  Mg     2.40     05-27    TPro  6.6  /  Alb  2.5<L>  /  TBili  25.2<H>  /  DBili  x   /  AST  121<H>  /  ALT  52<H>  /  AlkPhos  582<H>  05-27      LIVER FUNCTIONS - ( 27 May 2024 05:41 )  Alb: 2.5 g/dL / Pro: 6.6 g/dL / ALK PHOS: 582 U/L / ALT: 52 U/L / AST: 121 U/L / GGT: x           Urinalysis Basic - ( 27 May 2024 05:41 )    Color: x / Appearance: x / SG: x / pH: x  Gluc: 162 mg/dL / Ketone: x  / Bili: x / Urobili: x   Blood: x / Protein: x / Nitrite: x   Leuk Esterase: x / RBC: x / WBC x   Sq Epi: x / Non Sq Epi: x / Bacteria: x

## 2024-05-28 NOTE — CHART NOTE - NSCHARTNOTEFT_GEN_A_CORE
Brief Update  Note    Liver chemistries downtrending slowly, but no clear evidence of existing stent on MRCP or Xray.  Stent may have potentially dislodged.   Will plan for repeat ERCP Wednesday.    Diet as tolerated today  NPO after midnight  3 AM CBC, CMP, coags: correct electrolytes via IV  Transfuse if Hgb < 7  Maintain working IV and active T&S  Ensure adequate hydration  See prior note for full recs  Rest of care per primary       Thank you for involving us in this patient's care. Please reach out if any further questions or concerns.    Anastasia Lu MD  Gastroenterology/Hepatology Fellow, PGY-7    [facility] ROUTINE CONSULT 24/7: [email or web address] ROUTINE CONSULT 24/7: [email or web address]  For urgent consults during the weekends (all day) and weeknights (5PM to 7 AM) please:  1. Contact on call GI team via page followed by TEAMS Call if no response  2. If no response, call the answering service ([phone]) Brief Update  Note    Liver chemistries downtrending slowly, but no clear evidence of existing stent on MRCP or Xray.  Stent may have potentially dislodged.   Will plan for repeat ERCP Wednesday.    Diet as tolerated today  NPO after midnight  3 AM CBC, CMP, coags: correct electrolytes via IV  Transfuse if Hgb < 7  Maintain working IV and active T&S  Ensure adequate hydration  See prior note for full recs  Rest of care per primary       Thank you for involving us in this patient's care. Please reach out if any further questions or concerns.    Anastasia Lu MD  Gastroenterology/Hepatology Fellow, PGY-7    [facility] ROUTINE CONSULT 24/7: [email or web address] ROUTINE CONSULT 24/7: [email or web address]  For urgent consults during the weekends (all day) and weeknights (5PM to 7 AM) please:  1. Contact on call GI team via page followed by TEAMS Call if no response  2. If no response, call the answering service ([phone])    Addendum:  Stent not seen on X-ray

## 2024-05-29 LAB
ALBUMIN SERPL ELPH-MCNC: 2.1 G/DL — LOW (ref 3.3–5)
ALP SERPL-CCNC: 519 U/L — HIGH (ref 40–120)
ALT FLD-CCNC: 45 U/L — HIGH (ref 4–41)
ANION GAP SERPL CALC-SCNC: 14 MMOL/L — SIGNIFICANT CHANGE UP (ref 7–14)
APTT BLD: 39.8 SEC — HIGH (ref 24.5–35.6)
AST SERPL-CCNC: 93 U/L — HIGH (ref 4–40)
BILIRUB DIRECT SERPL-MCNC: >20 MG/DL — HIGH (ref 0–0.3)
BILIRUB INDIRECT FLD-MCNC: SIGNIFICANT CHANGE UP MG/DL (ref 0–1)
BILIRUB SERPL-MCNC: 24.5 MG/DL — HIGH (ref 0.2–1.2)
BLD GP AB SCN SERPL QL: NEGATIVE — SIGNIFICANT CHANGE UP
BUN SERPL-MCNC: 47 MG/DL — HIGH (ref 7–23)
CALCIUM SERPL-MCNC: 8.7 MG/DL — SIGNIFICANT CHANGE UP (ref 8.4–10.5)
CHLORIDE SERPL-SCNC: 107 MMOL/L — SIGNIFICANT CHANGE UP (ref 98–107)
CO2 SERPL-SCNC: 16 MMOL/L — LOW (ref 22–31)
CREAT SERPL-MCNC: 1.39 MG/DL — HIGH (ref 0.5–1.3)
EGFR: 57 ML/MIN/1.73M2 — LOW
GLUCOSE BLDC GLUCOMTR-MCNC: 115 MG/DL — HIGH (ref 70–99)
GLUCOSE BLDC GLUCOMTR-MCNC: 128 MG/DL — HIGH (ref 70–99)
GLUCOSE BLDC GLUCOMTR-MCNC: 141 MG/DL — HIGH (ref 70–99)
GLUCOSE BLDC GLUCOMTR-MCNC: 147 MG/DL — HIGH (ref 70–99)
GLUCOSE BLDC GLUCOMTR-MCNC: 148 MG/DL — HIGH (ref 70–99)
GLUCOSE BLDC GLUCOMTR-MCNC: 176 MG/DL — HIGH (ref 70–99)
GLUCOSE BLDC GLUCOMTR-MCNC: 181 MG/DL — HIGH (ref 70–99)
GLUCOSE BLDC GLUCOMTR-MCNC: 184 MG/DL — HIGH (ref 70–99)
GLUCOSE SERPL-MCNC: 180 MG/DL — HIGH (ref 70–99)
HCT VFR BLD CALC: 27.4 % — LOW (ref 39–50)
HGB BLD-MCNC: 9.7 G/DL — LOW (ref 13–17)
INR BLD: 1.93 RATIO — HIGH (ref 0.85–1.18)
MAGNESIUM SERPL-MCNC: 2.2 MG/DL — SIGNIFICANT CHANGE UP (ref 1.6–2.6)
MCHC RBC-ENTMCNC: 30.6 PG — SIGNIFICANT CHANGE UP (ref 27–34)
MCHC RBC-ENTMCNC: 35.4 GM/DL — SIGNIFICANT CHANGE UP (ref 32–36)
MCV RBC AUTO: 86.4 FL — SIGNIFICANT CHANGE UP (ref 80–100)
NRBC # BLD: 0 /100 WBCS — SIGNIFICANT CHANGE UP (ref 0–0)
NRBC # FLD: 0.02 K/UL — HIGH (ref 0–0)
PHOSPHATE SERPL-MCNC: 3.1 MG/DL — SIGNIFICANT CHANGE UP (ref 2.5–4.5)
PLATELET # BLD AUTO: 253 K/UL — SIGNIFICANT CHANGE UP (ref 150–400)
POTASSIUM SERPL-MCNC: 4.3 MMOL/L — SIGNIFICANT CHANGE UP (ref 3.5–5.3)
POTASSIUM SERPL-SCNC: 4.3 MMOL/L — SIGNIFICANT CHANGE UP (ref 3.5–5.3)
PROT SERPL-MCNC: 5.7 G/DL — LOW (ref 6–8.3)
PROTHROM AB SERPL-ACNC: 21.2 SEC — HIGH (ref 9.5–13)
RBC # BLD: 3.17 M/UL — LOW (ref 4.2–5.8)
RBC # FLD: 22.8 % — HIGH (ref 10.3–14.5)
RH IG SCN BLD-IMP: POSITIVE — SIGNIFICANT CHANGE UP
SODIUM SERPL-SCNC: 137 MMOL/L — SIGNIFICANT CHANGE UP (ref 135–145)
WBC # BLD: 12.56 K/UL — HIGH (ref 3.8–10.5)
WBC # FLD AUTO: 12.56 K/UL — HIGH (ref 3.8–10.5)

## 2024-05-29 PROCEDURE — 74328 X-RAY BILE DUCT ENDOSCOPY: CPT | Mod: 26

## 2024-05-29 PROCEDURE — 43276 ERCP STENT EXCHANGE W/DILATE: CPT | Mod: GC

## 2024-05-29 PROCEDURE — 99232 SBSQ HOSP IP/OBS MODERATE 35: CPT

## 2024-05-29 PROCEDURE — 43264 ERCP REMOVE DUCT CALCULI: CPT | Mod: GC

## 2024-05-29 DEVICE — STENT BIL WALL RX 10X60 UNCOVERED: Type: IMPLANTABLE DEVICE | Status: FUNCTIONAL

## 2024-05-29 RX ORDER — ONDANSETRON 8 MG/1
4 TABLET, FILM COATED ORAL ONCE
Refills: 0 | Status: COMPLETED | OUTPATIENT
Start: 2024-05-29 | End: 2024-05-29

## 2024-05-29 RX ORDER — INSULIN GLARGINE 100 [IU]/ML
7 INJECTION, SOLUTION SUBCUTANEOUS AT BEDTIME
Refills: 0 | Status: DISCONTINUED | OUTPATIENT
Start: 2024-05-29 | End: 2024-05-30

## 2024-05-29 RX ORDER — SODIUM CHLORIDE 9 MG/ML
1000 INJECTION, SOLUTION INTRAVENOUS
Refills: 0 | Status: DISCONTINUED | OUTPATIENT
Start: 2024-05-29 | End: 2024-05-30

## 2024-05-29 RX ORDER — HEPARIN SODIUM 5000 [USP'U]/ML
5000 INJECTION INTRAVENOUS; SUBCUTANEOUS EVERY 8 HOURS
Refills: 0 | Status: DISCONTINUED | OUTPATIENT
Start: 2024-05-29 | End: 2024-05-31

## 2024-05-29 RX ADMIN — LIDOCAINE 1 PATCH: 4 CREAM TOPICAL at 12:02

## 2024-05-29 RX ADMIN — ATENOLOL 50 MILLIGRAM(S): 25 TABLET ORAL at 05:08

## 2024-05-29 RX ADMIN — HEPARIN SODIUM 5000 UNIT(S): 5000 INJECTION INTRAVENOUS; SUBCUTANEOUS at 22:06

## 2024-05-29 RX ADMIN — CHOLESTYRAMINE 4 GRAM(S): 4 POWDER, FOR SUSPENSION ORAL at 12:03

## 2024-05-29 RX ADMIN — ONDANSETRON 4 MILLIGRAM(S): 8 TABLET, FILM COATED ORAL at 09:17

## 2024-05-29 RX ADMIN — URSODIOL 250 MILLIGRAM(S): 250 TABLET, FILM COATED ORAL at 05:08

## 2024-05-29 RX ADMIN — Medication 1: at 08:40

## 2024-05-29 RX ADMIN — INSULIN GLARGINE 7 UNIT(S): 100 INJECTION, SOLUTION SUBCUTANEOUS at 22:06

## 2024-05-29 RX ADMIN — HEPARIN SODIUM 5000 UNIT(S): 5000 INJECTION INTRAVENOUS; SUBCUTANEOUS at 14:22

## 2024-05-29 RX ADMIN — FAMOTIDINE 40 MILLIGRAM(S): 10 INJECTION INTRAVENOUS at 12:04

## 2024-05-29 RX ADMIN — SODIUM CHLORIDE 150 MILLILITER(S): 9 INJECTION, SOLUTION INTRAVENOUS at 12:23

## 2024-05-29 RX ADMIN — URSODIOL 250 MILLIGRAM(S): 250 TABLET, FILM COATED ORAL at 18:21

## 2024-05-29 NOTE — CHART NOTE - NSCHARTNOTEFT_GEN_A_CORE
Endocrine follow up  See last progress note for complete plan of care including discharge planning   Chart reviewed: Patient NPO and having endoscopy today   Continue with Lantus 5 units tonight if patient remains NPO  If diet is advanced, increase Lantus to 7 units   Continue with Admelog LOW correctional scale every 6 hours while NPO or before meals and bedtime when eating   Endocrine: [phone] for urgent questions or [email or web address] for NON-urgent questions    CAPILLARY BLOOD GLUCOSE      POCT Blood Glucose.: 184 mg/dL (29 May 2024 09:08)  POCT Blood Glucose.: 181 mg/dL (29 May 2024 08:38)  POCT Blood Glucose.: 176 mg/dL (29 May 2024 05:56)  POCT Blood Glucose.: 218 mg/dL (28 May 2024 21:20)  POCT Blood Glucose.: 171 mg/dL (28 May 2024 17:07)  POCT Blood Glucose.: 160 mg/dL (28 May 2024 11:36)      05-29    137  |  107  |  47<H>  ----------------------------<  180<H>  4.3   |  16<L>  |  1.39<H>    Ca    8.7      29 May 2024 05:33  Phos  3.1     05-29  Mg     2.20     05-29    TPro  5.7<L>  /  Alb  2.1<L>  /  TBili  24.5<H>  /  DBili  >20.0<H>  /  AST  93<H>  /  ALT  45<H>  /  AlkPhos  519<H>  05-29      MEDICATIONS  (STANDING):  atenolol  Tablet 50 milliGRAM(s) Oral daily  cholestyramine Powder (Sugar-Free) 4 Gram(s) Oral daily  dextrose 10% Bolus 125 milliLiter(s) IV Bolus once  dextrose 5% + sodium chloride 0.45%. 1000 milliLiter(s) (75 mL/Hr) IV Continuous <Continuous>  dextrose 5%. 1000 milliLiter(s) (50 mL/Hr) IV Continuous <Continuous>  dextrose 5%. 1000 milliLiter(s) (100 mL/Hr) IV Continuous <Continuous>  dextrose 50% Injectable 25 Gram(s) IV Push once  dextrose 50% Injectable 12.5 Gram(s) IV Push once  famotidine    Tablet 40 milliGRAM(s) Oral daily  insulin glargine Injectable (LANTUS) 5 Unit(s) SubCutaneous at bedtime  insulin lispro (ADMELOG) corrective regimen sliding scale   SubCutaneous three times a day before meals  insulin lispro (ADMELOG) corrective regimen sliding scale   SubCutaneous at bedtime  lidocaine   4% Patch 1 Patch Transdermal daily  polyethylene glycol 3350 17 Gram(s) Oral daily  senna 2 Tablet(s) Oral at bedtime  ursodiol Tablet 250 milliGRAM(s) Oral two times a day      Diet, NPO (05-29-24 @ 07:03)      A1C with Estimated Average Glucose Result: 9.0 % (05-18-24 @ 06:35)

## 2024-05-29 NOTE — PACU DISCHARGE NOTE - COMMENTS
VSS & REVIEWED BY ANESTHESIA MD.  NO ANESTHESIA COMPLICATIONS NOTED.  TRANSFER TO FLOOR.
no anesthesia complications

## 2024-05-29 NOTE — CHART NOTE - NSCHARTNOTEFT_GEN_A_CORE
STATUS POST: ERCP with uncovered metal stent placement    HD#: 12d    INTERVAL EVENTS: Patient seen at bedside. Endorses some fatigue. Denies abdominal pain, nausea, vomiting, chest pain, or SOB. - flatus / - BM. Tolerating clears.      ---------------------------------------------------------------------------------------------   VITALS  T(C): 36.4 (05-29-24 @ 16:27), Max: 36.6 (05-28-24 @ 20:50)  HR: 48 (05-29-24 @ 16:27) (46 - 67)  BP: 100/58 (05-29-24 @ 16:27) (92/51 - 128/60)  RR: 18 (05-29-24 @ 16:27) (10 - 18)  SpO2: 100% (05-29-24 @ 16:27) (99% - 100%)  CAPILLARY BLOOD GLUCOSE      POCT Blood Glucose.: 147 mg/dL (29 May 2024 11:47)  POCT Blood Glucose.: 141 mg/dL (29 May 2024 10:37)  POCT Blood Glucose.: 184 mg/dL (29 May 2024 09:08)  POCT Blood Glucose.: 181 mg/dL (29 May 2024 08:38)  POCT Blood Glucose.: 176 mg/dL (29 May 2024 05:56)  POCT Blood Glucose.: 218 mg/dL (28 May 2024 21:20)  POCT Blood Glucose.: 171 mg/dL (28 May 2024 17:07)      Is/Os    05-28 @ 07:01  -  05-29 @ 07:00  --------------------------------------------------------  IN:    dextrose 5% + sodium chloride 0.45%: 375 mL  Total IN: 375 mL    OUT:    Voided (mL): 900 mL  Total OUT: 900 mL    Total NET: -525 mL      05-29 @ 07:01 - 05-29 @ 17:04  --------------------------------------------------------  IN:    dextrose 5% + sodium chloride 0.45%: 300 mL    Lactated Ringers: 450 mL  Total IN: 750 mL    OUT:    Oral Fluid: 0 mL  Total OUT: 0 mL    Total NET: 750 mL          ---------------------------------------------------------------------------------------------   PHYSICAL EXAM:  General: NAD, Lying in bed comfortably, alert, oriented x3  HEENT: NC/AT, EOMI  Cardio: RRR, S1/S2  Resp: Good effort, normal CW expansion  GI/Abd: Soft, NT/ND, no rebound/guarding  Vascular: All 4 extremities warm.  Musculoskeletal: All 4 extremities moving spontaneously, no limitations, no LE edema    ---------------------------------------------------------------------------------------------   MEDICATIONS (STANDING): atenolol  Tablet 50 milliGRAM(s) Oral daily  cholestyramine Powder (Sugar-Free) 4 Gram(s) Oral daily  dextrose 50% Injectable 12.5 Gram(s) IV Push once  dextrose 50% Injectable 25 Gram(s) IV Push once  famotidine    Tablet 40 milliGRAM(s) Oral daily  heparin   Injectable 5000 Unit(s) SubCutaneous every 8 hours  insulin glargine Injectable (LANTUS) 7 Unit(s) SubCutaneous at bedtime  insulin lispro (ADMELOG) corrective regimen sliding scale   SubCutaneous at bedtime  insulin lispro (ADMELOG) corrective regimen sliding scale   SubCutaneous three times a day before meals  lactated ringers. 1000 milliLiter(s) IV Continuous <Continuous>  polyethylene glycol 3350 17 Gram(s) Oral daily  senna 2 Tablet(s) Oral at bedtime  ursodiol Tablet 250 milliGRAM(s) Oral two times a day    MEDICATIONS (PRN):acetaminophen     Tablet .. 650 milliGRAM(s) Oral every 6 hours PRN Temp greater or equal to 38C (100.4F), Mild Pain (1 - 3), Moderate Pain (4 - 6), Severe Pain (7 - 10)  albuterol/ipratropium for Nebulization 3 milliLiter(s) Nebulizer every 6 hours PRN Shortness of Breath and/or Wheezing  dextrose Oral Gel 15 Gram(s) Oral once PRN Blood Glucose LESS THAN 70 milliGRAM(s)/deciliter  hydrOXYzine hydrochloride 25 milliGRAM(s) Oral every 8 hours PRN Itching      ---------------------------------------------------------------------------------------------   LABS  CBC (05-29 @ 05:33)                              9.7<L>                         12.56<H>  )----------------(  253        --    % Neutrophils, --    % Lymphocytes, ANC: --                                  27.4<L>    BMP (05-29 @ 05:33)             137     |  107     |  47<H> 		Ca++ --      Ca 8.7                ---------------------------------( 180<H>		Mg 2.20               4.3     |  16<L>   |  1.39<H>			Ph 3.1       LFTs (05-29 @ 05:33)      TPro 5.7<L> / Alb 2.1<L> / TBili 24.5<H> / DBili >20.0<H> / AST 93<H> / ALT 45<H> / AlkPhos 519<H>    Jaguar (05-29 @ 05:33)  aPTT 39.8<H> / INR 1.93<H> / PT 21.2<H>            IMAGING STUDIES      ---------------------------------------------------------------------------------------------       ASSESSMENT  63y male s/p ERCP and stent placement on 5/29    PLAN  -   - Diet:   - Pain control with PO/IV medications.    - Continue home medications  - OOB, ambulate as tolerated  - continue chemical VTE ppx  - Will discuss with attending. STATUS POST: ERCP with metal stent placement    HD#: 12d    INTERVAL EVENTS: Patient seen at bedside. Endorses some fatigue. Denies abdominal pain, nausea, vomiting, chest pain, or SOB. - flatus / - BM. Tolerating clears.      ---------------------------------------------------------------------------------------------   VITALS  T(C): 36.4 (05-29-24 @ 16:27), Max: 36.6 (05-28-24 @ 20:50)  HR: 48 (05-29-24 @ 16:27) (46 - 67)  BP: 100/58 (05-29-24 @ 16:27) (92/51 - 128/60)  RR: 18 (05-29-24 @ 16:27) (10 - 18)  SpO2: 100% (05-29-24 @ 16:27) (99% - 100%)  CAPILLARY BLOOD GLUCOSE      POCT Blood Glucose.: 147 mg/dL (29 May 2024 11:47)  POCT Blood Glucose.: 141 mg/dL (29 May 2024 10:37)  POCT Blood Glucose.: 184 mg/dL (29 May 2024 09:08)  POCT Blood Glucose.: 181 mg/dL (29 May 2024 08:38)  POCT Blood Glucose.: 176 mg/dL (29 May 2024 05:56)  POCT Blood Glucose.: 218 mg/dL (28 May 2024 21:20)  POCT Blood Glucose.: 171 mg/dL (28 May 2024 17:07)      Is/Os    05-28 @ 07:01  -  05-29 @ 07:00  --------------------------------------------------------  IN:    dextrose 5% + sodium chloride 0.45%: 375 mL  Total IN: 375 mL    OUT:    Voided (mL): 900 mL  Total OUT: 900 mL    Total NET: -525 mL      05-29 @ 07:01  - 05-29 @ 17:04  --------------------------------------------------------  IN:    dextrose 5% + sodium chloride 0.45%: 300 mL    Lactated Ringers: 450 mL  Total IN: 750 mL    OUT:    Oral Fluid: 0 mL  Total OUT: 0 mL    Total NET: 750 mL          ---------------------------------------------------------------------------------------------   PHYSICAL EXAM:  General: NAD, Lying in bed comfortably, alert, oriented x3  HEENT: NC/AT, EOMI  Cardio: RRR, S1/S2  Resp: Good effort, normal CW expansion  GI/Abd: Soft, NT/ND, no rebound/guarding  Vascular: All 4 extremities warm.  Musculoskeletal: All 4 extremities moving spontaneously, no limitations, no LE edema    ---------------------------------------------------------------------------------------------   MEDICATIONS (STANDING): atenolol  Tablet 50 milliGRAM(s) Oral daily  cholestyramine Powder (Sugar-Free) 4 Gram(s) Oral daily  dextrose 50% Injectable 12.5 Gram(s) IV Push once  dextrose 50% Injectable 25 Gram(s) IV Push once  famotidine    Tablet 40 milliGRAM(s) Oral daily  heparin   Injectable 5000 Unit(s) SubCutaneous every 8 hours  insulin glargine Injectable (LANTUS) 7 Unit(s) SubCutaneous at bedtime  insulin lispro (ADMELOG) corrective regimen sliding scale   SubCutaneous at bedtime  insulin lispro (ADMELOG) corrective regimen sliding scale   SubCutaneous three times a day before meals  lactated ringers. 1000 milliLiter(s) IV Continuous <Continuous>  polyethylene glycol 3350 17 Gram(s) Oral daily  senna 2 Tablet(s) Oral at bedtime  ursodiol Tablet 250 milliGRAM(s) Oral two times a day    MEDICATIONS (PRN):acetaminophen     Tablet .. 650 milliGRAM(s) Oral every 6 hours PRN Temp greater or equal to 38C (100.4F), Mild Pain (1 - 3), Moderate Pain (4 - 6), Severe Pain (7 - 10)  albuterol/ipratropium for Nebulization 3 milliLiter(s) Nebulizer every 6 hours PRN Shortness of Breath and/or Wheezing  dextrose Oral Gel 15 Gram(s) Oral once PRN Blood Glucose LESS THAN 70 milliGRAM(s)/deciliter  hydrOXYzine hydrochloride 25 milliGRAM(s) Oral every 8 hours PRN Itching      ---------------------------------------------------------------------------------------------   LABS  CBC (05-29 @ 05:33)                              9.7<L>                         12.56<H>  )----------------(  253        --    % Neutrophils, --    % Lymphocytes, ANC: --                                  27.4<L>    BMP (05-29 @ 05:33)             137     |  107     |  47<H> 		Ca++ --      Ca 8.7                ---------------------------------( 180<H>		Mg 2.20               4.3     |  16<L>   |  1.39<H>			Ph 3.1       LFTs (05-29 @ 05:33)      TPro 5.7<L> / Alb 2.1<L> / TBili 24.5<H> / DBili >20.0<H> / AST 93<H> / ALT 45<H> / AlkPhos 519<H>    Jaguar (05-29 @ 05:33)  aPTT 39.8<H> / INR 1.93<H> / PT 21.2<H>            IMAGING STUDIES      ---------------------------------------------------------------------------------------------       ASSESSMENT  63M PMH HTN, T2DM (on insulin), hemorrhoid, hyperthyroidism, asthma, GERD, and RAMANA (not on CPAP), surgical history of open cholecystectomy 15 years ago and appendectomy in 1975, p/w 6 weeks of progressive fatigue, painless jaundice, malodorous loose stools, worsening glycemic control, weight loss, pruritus who was found to have a pancreatic head/uncinate neck mass with upstream pancreatic and biliary ductal dilatation with elevated tumor CA-19-9 and CEA concerning for pancreatic adenocarcinoma, bx confirms diagnosis.  S/p ERCP and stent on 5/22. bx c/w pancreatic adenocarcinoma. Repeat MRCP/KUB on 5/28 without evidence of stent, possibly dislodged. Now s/p repeat ERCP and stent placement on 5/29.    PLAN  - Diet: Clears, adv tomorrow as tolerated  - IVF  - Pain control: Tylenol  - Continue home medications  - OOB, ambulate as tolerated  - VTE ppx: sqh, scd's    E Team Surgery  [id number]

## 2024-05-29 NOTE — PRE PROCEDURE NOTE - PRE PROCEDURE EVALUATION
Attending Physician:   Dr. Goodwin/Ma    Procedure: ERCP    Indication for Procedure: Biliary stricture    ________________________________________________________  PAST MEDICAL & SURGICAL HISTORY:  Asthma      HTN (hypertension)      GERD (gastroesophageal reflux disease)      Sleep apnea      Hyperlipidemia      Type 2 diabetes mellitus      History of cholecystectomy      Hyperthyroidism      History of appendectomy        ALLERGIES:  No Known Allergies    HOME MEDICATIONS:  Admelog SoloStar 100 units/mL injectable solution: 50 unit(s) injectable once a day  atenolol 50 mg oral tablet: 1 tab(s) orally once a day  famotidine 40 mg oral tablet: 1 tab(s) orally once a day  Lantus Solostar Pen 100 units/mL subcutaneous solution: 50 unit(s) subcutaneous once a day (at bedtime)  lisinopril 20 mg oral tablet: 1 tab(s) orally once a day  rosuvastatin 10 mg oral tablet: 1 tab(s) orally once a day (at bedtime)  Ventolin Nebules 2.5 mg/3 mL (0.083%) inhalation solution: 3 milliliter(s) by nebulizer once a day as needed for  shortness of breath and/or wheezing    AICD/PPM: [ ] yes   [x ] no    PERTINENT LAB DATA:                        9.7    12.56 )-----------( 253      ( 29 May 2024 05:33 )             27.4     05-29    137  |  107  |  47<H>  ----------------------------<  180<H>  4.3   |  16<L>  |  1.39<H>    Ca    8.7      29 May 2024 05:33  Phos  3.1     05-29  Mg     2.20     05-29    TPro  5.7<L>  /  Alb  2.1<L>  /  TBili  24.5<H>  /  DBili  >20.0<H>  /  AST  93<H>  /  ALT  45<H>  /  AlkPhos  519<H>  05-29    PT/INR - ( 29 May 2024 05:33 )   PT: 21.2 sec;   INR: 1.93 ratio         PTT - ( 29 May 2024 05:33 )  PTT:39.8 sec            PHYSICAL EXAMINATION:    Height (cm): 177.8  Weight (kg): 99.79  BMI (kg/m2): 31.6  BSA (m2): 2.17T(C): 36  HR: 46  BP: 102/58  RR: 18  SpO2: 99%    Constitutional: NAD  HEENT: PERRLA, EOMI,   +icterus  Neck:  No JVD  Respiratory: CTAB/L  Cardiovascular: S1 and S2  Gastrointestinal: BS+, soft, NT/ND  Extremities: No peripheral edema  Neurological: A/O x 3, no focal deficits  Psychiatric: Normal mood, normal affect  Skin: No rashes    ASA Class: I [ ]  II [ ]  III [ x]  IV [ ]    COMMENTS:    The patient is a suitable candidate for the planned procedure unless box checked [ ]  No, explain:    I explained the risks (bleeding, infection, perforation, pancreatitis, CV risk, anesthesia risk)/b/a with the patient. The patient agrees to proceed. Patient had opportunity to ask questions in preprocedure bay 7. All questions answered.

## 2024-05-29 NOTE — PRE-OP CHECKLIST - NOTHING BY MOUTH SINCE
----- Message from Hung Denise MD sent at 7/8/2019  9:10 AM EDT -----  Call patient with lab result-call patient negative, repeat 3 years 
PT AWARE OF RESULTS
29-May-2024 00:00
21-May-2024 00:00

## 2024-05-29 NOTE — PROGRESS NOTE ADULT - ASSESSMENT
63M PMH HTN, T2DM (on insulin), hemorrhoid, hyperthyroidism, asthma, GERD, and RAMANA (not on CPAP), surgical history of open cholecystectomy 15 years ago and appendectomy in 1975, p/w 6 weeks of progressive fatigue, painless jaundice, malodorous loose stools, worsening glycemic control, weight loss, pruritus who was found to have a pancreatic head/uncinate neck mass with upstream pancreatic and biliary ductal dilatation with elevated tumor CA-19-9 and CEA concerning for pancreatic adenocarcinoma, bx confirms diagnosis.  S/p ERCP and stent. bx c/w pancreatic adenocarcinoma     Plan:   - MRCP -->Common bile duct tapers in the region of the pancreatic mass and contains layering debris.  - KUB 5/29 --> stent not visualized, possibly dislodged  - Plan for ERCP with GI today 5/29  - Trending Tbili  - Endocrine following - appreciate recs  - Lantus 5u while NPO  - JARRETT  - Diet: NPO for procedure      E Team Surgery  [id number]

## 2024-05-29 NOTE — PROVIDER CONTACT NOTE (CHANGE IN STATUS NOTIFICATION) - SITUATION
patient c/o of chills & shivering; oral temp 97.6; hr 52; bp 110/51; asking RN to turn up the heat in the room, patient room temp is already 76F, increased to 78F

## 2024-05-29 NOTE — PROVIDER CONTACT NOTE (CHANGE IN STATUS NOTIFICATION) - ASSESSMENT
patient c/o of chills & shivering; oral temp 97.6; hr 52; bp 110/51; denies nausea, vomiting, dizziness, lightheadedness, chest pain, shortness, of breath, palpitations, etc.

## 2024-05-29 NOTE — ASU PREOP CHECKLIST - WAS PATIENT ON BETA BLOCKER?
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  Pt reported symptom progression s/p fall with pain not relived by conservative measures, steroid injections, and PT refused.  Associated symptoms include: activity changes, numbness, tingling, and back pain.  Pt denies HA, dizziness, SOB, CP, palpitations, nausea, vomiting, abdominal pain, diarrhea, and all other additional complaints.  Pt underwent total R knee arthroplasty today per Dr. Aguilar (Orthopedic Surgery).  Pt is a full code and Medhat Pitts (daughter) at [phone] is the surrogate decision maker.  Hospital Medicine consulted for medical management post procedure at the request of the primary team.     No

## 2024-05-29 NOTE — PROGRESS NOTE ADULT - SUBJECTIVE AND OBJECTIVE BOX
D/E TEAM Surgery Progress Note  Patient is a 63y old  Male who presents with a chief complaint of Jaundice, pancreatic mass (28 May 2024 14:18)    SUBJECTIVE: Patient seen and examined at bedside with surgical team, patient without complaints. Denies CP, SOB nausea, vomiting, abdominal pain.    OBJECTIVE:    Vital Signs Last 24 Hrs  T(C): 36.4 (29 May 2024 05:10), Max: 36.9 (28 May 2024 12:02)  T(F): 97.5 (29 May 2024 05:10), Max: 98.4 (28 May 2024 12:02)  HR: 67 (29 May 2024 05:10) (55 - 67)  BP: 128/60 (29 May 2024 05:10) (92/51 - 128/60)  BP(mean): --  RR: 17 (29 May 2024 05:10) (17 - 18)  SpO2: 100% (29 May 2024 05:10) (98% - 100%)    Parameters below as of 29 May 2024 05:10  Patient On (Oxygen Delivery Method): room air    I&O's Detail    27 May 2024 07:01  -  28 May 2024 07:00  --------------------------------------------------------  IN:    Oral Fluid: 1150 mL    sodium chloride 0.9%: 675 mL  Total IN: 1825 mL    OUT:    Voided (mL): 1150 mL  Total OUT: 1150 mL    Total NET: 675 mL      28 May 2024 07:01  -  29 May 2024 06:58  --------------------------------------------------------  IN:    dextrose 5% + sodium chloride 0.45%: 375 mL  Total IN: 375 mL    OUT:    Voided (mL): 900 mL  Total OUT: 900 mL    Total NET: -525 mL      MEDICATIONS  (STANDING):  atenolol  Tablet 50 milliGRAM(s) Oral daily  cholestyramine Powder (Sugar-Free) 4 Gram(s) Oral daily  dextrose 10% Bolus 125 milliLiter(s) IV Bolus once  dextrose 5% + sodium chloride 0.45%. 1000 milliLiter(s) (75 mL/Hr) IV Continuous <Continuous>  dextrose 5%. 1000 milliLiter(s) (100 mL/Hr) IV Continuous <Continuous>  dextrose 5%. 1000 milliLiter(s) (50 mL/Hr) IV Continuous <Continuous>  dextrose 50% Injectable 25 Gram(s) IV Push once  dextrose 50% Injectable 12.5 Gram(s) IV Push once  famotidine    Tablet 40 milliGRAM(s) Oral daily  insulin glargine Injectable (LANTUS) 5 Unit(s) SubCutaneous at bedtime  insulin lispro (ADMELOG) corrective regimen sliding scale   SubCutaneous three times a day before meals  insulin lispro (ADMELOG) corrective regimen sliding scale   SubCutaneous at bedtime  lidocaine   4% Patch 1 Patch Transdermal daily  polyethylene glycol 3350 17 Gram(s) Oral daily  senna 2 Tablet(s) Oral at bedtime  ursodiol Tablet 250 milliGRAM(s) Oral two times a day    MEDICATIONS  (PRN):  acetaminophen     Tablet .. 650 milliGRAM(s) Oral every 6 hours PRN Temp greater or equal to 38C (100.4F), Mild Pain (1 - 3), Moderate Pain (4 - 6), Severe Pain (7 - 10)  albuterol/ipratropium for Nebulization 3 milliLiter(s) Nebulizer every 6 hours PRN Shortness of Breath and/or Wheezing  calamine/zinc oxide Lotion 1 Application(s) Topical three times a day PRN Itching  dextrose Oral Gel 15 Gram(s) Oral once PRN Blood Glucose LESS THAN 70 milliGRAM(s)/deciliter  hydrOXYzine hydrochloride 25 milliGRAM(s) Oral every 8 hours PRN Itching  petrolatum white Ointment 1 Application(s) Topical every 6 hours PRN Dry lips      PHYSICAL EXAM:  Constitutional: A&Ox3, NAD  Respiratory: Unlabored breathing  Abdomen: Soft, nondistended, NTTP. No rebound or guarding.  Extremities: WWP, HATFIELD spontaneously    LABS:                        9.7    12.56 )-----------( 253      ( 29 May 2024 05:33 )             27.4     05-28    136  |  103  |  50<H>  ----------------------------<  207<H>  4.2   |  19<L>  |  1.63<H>    Ca    8.3<L>      28 May 2024 04:55  Phos  3.7     05-28  Mg     2.20     05-28    TPro  5.6<L>  /  Alb  2.1<L>  /  TBili  22.0<H>  /  DBili  x   /  AST  86<H>  /  ALT  42<H>  /  AlkPhos  474<H>  05-28    PT/INR - ( 29 May 2024 05:33 )   PT: 21.2 sec;   INR: 1.93 ratio         PTT - ( 29 May 2024 05:33 )  PTT:39.8 sec  LIVER FUNCTIONS - ( 28 May 2024 04:55 )  Alb: 2.1 g/dL / Pro: 5.6 g/dL / ALK PHOS: 474 U/L / ALT: 42 U/L / AST: 86 U/L / GGT: x           Urinalysis Basic - ( 28 May 2024 04:55 )    Color: x / Appearance: x / SG: x / pH: x  Gluc: 207 mg/dL / Ketone: x  / Bili: x / Urobili: x   Blood: x / Protein: x / Nitrite: x   Leuk Esterase: x / RBC: x / WBC x   Sq Epi: x / Non Sq Epi: x / Bacteria: x

## 2024-05-30 PROBLEM — Z00.00 ENCOUNTER FOR PREVENTIVE HEALTH EXAMINATION: Status: ACTIVE | Noted: 2024-05-30

## 2024-05-30 PROBLEM — E11.9 TYPE 2 DIABETES MELLITUS WITHOUT COMPLICATIONS: Chronic | Status: ACTIVE | Noted: 2024-05-18

## 2024-05-30 PROBLEM — E78.5 HYPERLIPIDEMIA, UNSPECIFIED: Chronic | Status: ACTIVE | Noted: 2024-05-18

## 2024-05-30 PROBLEM — E05.90 THYROTOXICOSIS, UNSPECIFIED WITHOUT THYROTOXIC CRISIS OR STORM: Chronic | Status: ACTIVE | Noted: 2024-05-18

## 2024-05-30 PROBLEM — Z90.49 ACQUIRED ABSENCE OF OTHER SPECIFIED PARTS OF DIGESTIVE TRACT: Chronic | Status: ACTIVE | Noted: 2024-05-18

## 2024-05-30 LAB
ALBUMIN SERPL ELPH-MCNC: 2.4 G/DL — LOW (ref 3.3–5)
ALP SERPL-CCNC: 526 U/L — HIGH (ref 40–120)
ALT FLD-CCNC: 45 U/L — HIGH (ref 4–41)
ANION GAP SERPL CALC-SCNC: 14 MMOL/L — SIGNIFICANT CHANGE UP (ref 7–14)
AST SERPL-CCNC: 102 U/L — HIGH (ref 4–40)
BILIRUB SERPL-MCNC: 24.7 MG/DL — HIGH (ref 0.2–1.2)
BUN SERPL-MCNC: 34 MG/DL — HIGH (ref 7–23)
CALCIUM SERPL-MCNC: 9.2 MG/DL — SIGNIFICANT CHANGE UP (ref 8.4–10.5)
CHLORIDE SERPL-SCNC: 108 MMOL/L — HIGH (ref 98–107)
CO2 SERPL-SCNC: 18 MMOL/L — LOW (ref 22–31)
CREAT SERPL-MCNC: 1.24 MG/DL — SIGNIFICANT CHANGE UP (ref 0.5–1.3)
EGFR: 65 ML/MIN/1.73M2 — SIGNIFICANT CHANGE UP
GLUCOSE BLDC GLUCOMTR-MCNC: 110 MG/DL — HIGH (ref 70–99)
GLUCOSE BLDC GLUCOMTR-MCNC: 135 MG/DL — HIGH (ref 70–99)
GLUCOSE BLDC GLUCOMTR-MCNC: 84 MG/DL — SIGNIFICANT CHANGE UP (ref 70–99)
GLUCOSE BLDC GLUCOMTR-MCNC: 97 MG/DL — SIGNIFICANT CHANGE UP (ref 70–99)
GLUCOSE SERPL-MCNC: 87 MG/DL — SIGNIFICANT CHANGE UP (ref 70–99)
HCT VFR BLD CALC: 22.4 % — LOW (ref 39–50)
HCT VFR BLD CALC: 26.9 % — LOW (ref 39–50)
HGB BLD-MCNC: 7.7 G/DL — LOW (ref 13–17)
HGB BLD-MCNC: 9.9 G/DL — LOW (ref 13–17)
MAGNESIUM SERPL-MCNC: 2.1 MG/DL — SIGNIFICANT CHANGE UP (ref 1.6–2.6)
MCHC RBC-ENTMCNC: 30 PG — SIGNIFICANT CHANGE UP (ref 27–34)
MCHC RBC-ENTMCNC: 31.5 PG — SIGNIFICANT CHANGE UP (ref 27–34)
MCHC RBC-ENTMCNC: 34.4 GM/DL — SIGNIFICANT CHANGE UP (ref 32–36)
MCHC RBC-ENTMCNC: 36.8 GM/DL — HIGH (ref 32–36)
MCV RBC AUTO: 85.7 FL — SIGNIFICANT CHANGE UP (ref 80–100)
MCV RBC AUTO: 87.2 FL — SIGNIFICANT CHANGE UP (ref 80–100)
NRBC # BLD: 0 /100 WBCS — SIGNIFICANT CHANGE UP (ref 0–0)
NRBC # BLD: 0 /100 WBCS — SIGNIFICANT CHANGE UP (ref 0–0)
NRBC # FLD: 0.02 K/UL — HIGH (ref 0–0)
NRBC # FLD: 0.07 K/UL — HIGH (ref 0–0)
PHOSPHATE SERPL-MCNC: 2.8 MG/DL — SIGNIFICANT CHANGE UP (ref 2.5–4.5)
PLATELET # BLD AUTO: 165 K/UL — SIGNIFICANT CHANGE UP (ref 150–400)
PLATELET # BLD AUTO: 283 K/UL — SIGNIFICANT CHANGE UP (ref 150–400)
POTASSIUM SERPL-MCNC: 3.5 MMOL/L — SIGNIFICANT CHANGE UP (ref 3.5–5.3)
POTASSIUM SERPL-SCNC: 3.5 MMOL/L — SIGNIFICANT CHANGE UP (ref 3.5–5.3)
PROT SERPL-MCNC: 6.1 G/DL — SIGNIFICANT CHANGE UP (ref 6–8.3)
RBC # BLD: 2.57 M/UL — LOW (ref 4.2–5.8)
RBC # BLD: 3.14 M/UL — LOW (ref 4.2–5.8)
RBC # FLD: 22.8 % — HIGH (ref 10.3–14.5)
RBC # FLD: 23.5 % — HIGH (ref 10.3–14.5)
SODIUM SERPL-SCNC: 140 MMOL/L — SIGNIFICANT CHANGE UP (ref 135–145)
WBC # BLD: 11.14 K/UL — HIGH (ref 3.8–10.5)
WBC # BLD: 13.39 K/UL — HIGH (ref 3.8–10.5)
WBC # FLD AUTO: 11.14 K/UL — HIGH (ref 3.8–10.5)
WBC # FLD AUTO: 13.39 K/UL — HIGH (ref 3.8–10.5)

## 2024-05-30 PROCEDURE — 99232 SBSQ HOSP IP/OBS MODERATE 35: CPT | Mod: GC

## 2024-05-30 PROCEDURE — 99232 SBSQ HOSP IP/OBS MODERATE 35: CPT

## 2024-05-30 RX ORDER — POTASSIUM CHLORIDE 20 MEQ
20 PACKET (EA) ORAL
Refills: 0 | Status: COMPLETED | OUTPATIENT
Start: 2024-05-30 | End: 2024-05-30

## 2024-05-30 RX ORDER — INSULIN GLARGINE 100 [IU]/ML
5 INJECTION, SOLUTION SUBCUTANEOUS AT BEDTIME
Refills: 0 | Status: DISCONTINUED | OUTPATIENT
Start: 2024-05-30 | End: 2024-05-31

## 2024-05-30 RX ORDER — SODIUM,POTASSIUM PHOSPHATES 278-250MG
1 POWDER IN PACKET (EA) ORAL ONCE
Refills: 0 | Status: COMPLETED | OUTPATIENT
Start: 2024-05-30 | End: 2024-05-30

## 2024-05-30 RX ADMIN — LIDOCAINE 1 PATCH: 4 CREAM TOPICAL at 14:52

## 2024-05-30 RX ADMIN — Medication 20 MILLIEQUIVALENT(S): at 15:29

## 2024-05-30 RX ADMIN — INSULIN GLARGINE 5 UNIT(S): 100 INJECTION, SOLUTION SUBCUTANEOUS at 22:09

## 2024-05-30 RX ADMIN — HEPARIN SODIUM 5000 UNIT(S): 5000 INJECTION INTRAVENOUS; SUBCUTANEOUS at 05:44

## 2024-05-30 RX ADMIN — HEPARIN SODIUM 5000 UNIT(S): 5000 INJECTION INTRAVENOUS; SUBCUTANEOUS at 14:51

## 2024-05-30 RX ADMIN — CHOLESTYRAMINE 4 GRAM(S): 4 POWDER, FOR SUSPENSION ORAL at 09:32

## 2024-05-30 RX ADMIN — FAMOTIDINE 40 MILLIGRAM(S): 10 INJECTION INTRAVENOUS at 14:51

## 2024-05-30 RX ADMIN — URSODIOL 250 MILLIGRAM(S): 250 TABLET, FILM COATED ORAL at 05:44

## 2024-05-30 RX ADMIN — Medication 20 MILLIEQUIVALENT(S): at 14:51

## 2024-05-30 RX ADMIN — Medication 1 TABLET(S): at 14:51

## 2024-05-30 NOTE — PROGRESS NOTE ADULT - ASSESSMENT
63M PMH HTN, T2DM (on insulin), hemorrhoid, hyperthyroidism, asthma, GERD, and RAMANA (not on CPAP), surgical history of open cholecystectomy 15 years ago and appendectomy in 1975, p/w 6 weeks of progressive fatigue, painless jaundice, malodorous loose stools, worsening glycemic control, weight loss, pruritus who was found to have a pancreatic head/uncinate neck mass with upstream pancreatic and biliary ductal dilatation with elevated tumor CA-19-9 and CEA concerning for pancreatic adenocarcinoma, bx confirms diagnosis.  S/p ERCP and stent on 5/22. bx c/w pancreatic adenocarcinoma. Repeat MRCP/KUB on 5/28 without evidence of stent, possibly dislodged. Now s/p repeat ERCP and stent placement on 5/29.    PLAN  - Diet: Regular CC  - IVL  - Pain control: Tylenol  - Continue home medications  - OOB, ambulate as tolerated  - VTE ppx: sqh, scd's    E Team Surgery  [phone].

## 2024-05-30 NOTE — PROGRESS NOTE ADULT - ASSESSMENT
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 Mass abuts the SMV and second portion of the duodenum. Mass also contacts the proximal SMA; extrahepatic/intrahepatic biliary dilation with CBD 2.2cm with abrupt narrowing to level of the pancreatic mass  - s/p ERCP 5/21/24: noted to have biliary stricture s /p sphincterotomy, brushing, and 10 mm x 60 mm covered metal stent in the CBD; irregular z-line biopsied; erythematous gastric mucosa biopsied.  - Path:  Biliary brushings: positive for adenocarcinoma  - Path: Pancreatic head/uncinate bx: positive for Invasive moderately-differentiated adenocarcinoma   - s/p ERCP 5/29: biliary stricture s/p stent placement 10 mm x 60 mm uncovered metal stent     Recommendations  - No additional intervention from [facility] indicated at this time  - Oncology evaluation  - Surgery following with plans for discussion at [facility]  - Daily CBC, CMP, coags  - Ensure adequate hydration  - Follow up with [facility] outpatient in 2-3 mos   - Rest of care per primary     Preliminary note until signed by Attending.    Thank you for involving us in this patient's care. Please reach out if any further questions or concerns.    Anastasia Lu MD  Gastroenterology/Hepatology Fellow, PGY-7    [facility] ROUTINE CONSULT 24/7: [email or web address] ROUTINE CONSULT 24/7: [email or web address]  For urgent consults during the weekends (all day) and weeknights (5PM to 7 AM) please:  1. Contact on call GI team via page followed by TEAMS Call if no response  2. If no response, call the answering service ([phone])     63M with history of HTN, HLD, type 2 DM (on insulin), hyperthyroidism, asthma, GERD, and RAMANA (not on CPAP) presents with 1 week of painless jaundice    Impression  #Pancreatic head/uncinate invasive moderately-differentiated adenocarcinoma  #Cholangiocarcinoma  #obstructive jaundice 2/2 pancreatic head/uncinate mass c/b biliary stricture  #weight loss; 25lbs over past 1 month  - afebrile; no leukocytosis; abd exam benign without tenderness of distension  - CT showing 3.5 x 4.5 x 3.0 cm heterogenous solid mass in the pancreatic head/uncinate process with upstream main PD dilatation. Mass abuts the SMV and second portion of the duodenum. Mass also contacts the proximal SMA; extrahepatic/intrahepatic biliary dilation with CBD 2.2cm with abrupt narrowing to level of the pancreatic mass  - s/p ERCP 5/21/24: noted to have biliary stricture s /p sphincterotomy, brushing, and 10 mm x 60 mm covered metal stent in the CBD; irregular z-line biopsied; erythematous gastric mucosa biopsied.  - Path:  Biliary brushings: positive for adenocarcinoma  - Path: Pancreatic head/uncinate bx: positive for Invasive moderately-differentiated adenocarcinoma   - s/p ERCP 5/29: biliary stricture s/p stent placement 10 mm x 60 mm uncovered metal stent     Recommendations  - No additional intervention from [facility] indicated at this time  - Oncology evaluation  - Surgery following with plans for discussion at [facility]  - Daily CBC, CMP, coags  - Ensure adequate hydration  - Follow up with [facility] outpatient in 2-3 mos   - Please provide patient with Gastroenterology Clinic for outpatient follow up with [facility]; [phone] ([facility] at [address] [place] [address])   - Rest of care per primary     Preliminary note until signed by Attending.    Thank you for involving us in this patient's care. Please reach out if any further questions or concerns.    Anastasia Lu MD  Gastroenterology/Hepatology Fellow, PGY-7    [facility] ROUTINE CONSULT 24/7: [email or web address]  [facility] ROUTINE CONSULT 24/7: [email or web address]  For urgent consults during the weekends (all day) and weeknights (5PM to 7 AM) please:  1. Contact on call GI team via page followed by TEAMS Call if no response  2. If no response, call the answering service ([phone])

## 2024-05-30 NOTE — PROGRESS NOTE ADULT - SUBJECTIVE AND OBJECTIVE BOX
Chief Complaint: type 2 DM    History: pt was npo yesterday for endoscopy. pt had dinner last night, but did not eat much for dinner. FS this morning was below goal, was 97 and serum glucose was 87.    MEDICATIONS  (STANDING):  atenolol  Tablet 50 milliGRAM(s) Oral daily  cholestyramine Powder (Sugar-Free) 4 Gram(s) Oral daily  dextrose 50% Injectable 12.5 Gram(s) IV Push once  dextrose 50% Injectable 25 Gram(s) IV Push once  famotidine    Tablet 40 milliGRAM(s) Oral daily  heparin   Injectable 5000 Unit(s) SubCutaneous every 8 hours  insulin glargine Injectable (LANTUS) 7 Unit(s) SubCutaneous at bedtime  insulin lispro (ADMELOG) corrective regimen sliding scale   SubCutaneous at bedtime  insulin lispro (ADMELOG) corrective regimen sliding scale   SubCutaneous three times a day before meals  lidocaine   4% Patch 1 Patch Transdermal daily  polyethylene glycol 3350 17 Gram(s) Oral daily  potassium chloride    Tablet ER 20 milliEquivalent(s) Oral every 2 hours  potassium phosphate / sodium phosphate Tablet (K-PHOS No. 2) 1 Tablet(s) Oral once  senna 2 Tablet(s) Oral at bedtime  ursodiol Tablet 250 milliGRAM(s) Oral two times a day    MEDICATIONS  (PRN):  acetaminophen     Tablet .. 650 milliGRAM(s) Oral every 6 hours PRN Temp greater or equal to 38C (100.4F), Mild Pain (1 - 3), Moderate Pain (4 - 6), Severe Pain (7 - 10)  albuterol/ipratropium for Nebulization 3 milliLiter(s) Nebulizer every 6 hours PRN Shortness of Breath and/or Wheezing  calamine/zinc oxide Lotion 1 Application(s) Topical three times a day PRN Itching  dextrose Oral Gel 15 Gram(s) Oral once PRN Blood Glucose LESS THAN 70 milliGRAM(s)/deciliter  hydrOXYzine hydrochloride 25 milliGRAM(s) Oral every 8 hours PRN Itching  petrolatum white Ointment 1 Application(s) Topical every 6 hours PRN Dry lips      Allergies    No Known Allergies    Intolerances      Review of Systems:  Cardiovascular: No chest pain, palpitations  Respiratory: No SOB, no cough  GI: No nausea, vomiting, abdominal pain  : No dysuria  Endocrine: no polyuria, polydipsia    PHYSICAL EXAM:  VITALS: T(C): 36.6 (05-30-24 @ 12:56)  T(F): 97.9 (05-30-24 @ 12:56), Max: 98.2 (05-30-24 @ 00:47)  HR: 52 (05-30-24 @ 12:56) (48 - 87)  BP: 97/53 (05-30-24 @ 12:56) (97/53 - 110/56)  RR:  (18 - 18)  SpO2:  (96% - 100%)  Wt(kg): --  GENERAL: NAD, well-groomed, well-developed  EYES: No proptosis  HEENT:  Atraumatic, Normocephalic  RESPIRATORY: non labored breathing   CARDIOVASCULAR: Regular rate and rhythm  GI: Soft, nontender, non distended  PSYCH: Alert and oriented x 3, normal affect, normal mood       CAPILLARY BLOOD GLUCOSE      POCT Blood Glucose.: 84 mg/dL (30 May 2024 11:53)  POCT Blood Glucose.: 97 mg/dL (30 May 2024 08:23)  POCT Blood Glucose.: 148 mg/dL (29 May 2024 21:25)  POCT Blood Glucose.: 128 mg/dL (29 May 2024 18:57)  POCT Blood Glucose.: 115 mg/dL (29 May 2024 16:56)      05-30    140  |  108<H>  |  34<H>  ----------------------------<  87  3.5   |  18<L>  |  1.24    eGFR: 65    Ca    9.2      05-30  Mg     2.10     05-30  Phos  2.8     05-30    TPro  6.1  /  Alb  2.4<L>  /  TBili  24.7<H>  /  DBili  x   /  AST  102<H>  /  ALT  45<H>  /  AlkPhos  526<H>  05-30          Thyroid Function Tests:  05-17 @ 16:22 TSH 0.70 FreeT4 -- T3 -- Anti TPO -- Anti Thyroglobulin Ab -- TSI --        A1C with Estimated Average Glucose Result: 9.0 % (05-18-24 @ 06:35)          Diet, Regular:   Consistent Carbohydrate Evening Snack (CSTCHOSN) (05-30-24 @ 07:45)

## 2024-05-30 NOTE — PROGRESS NOTE ADULT - SUBJECTIVE AND OBJECTIVE BOX
ANESTHESIA POSTOP CHECK    63y Male POSTOP DAY 1     Vital Signs Last 24 Hrs  T(C): 36.7 (30 May 2024 09:26), Max: 36.8 (30 May 2024 00:47)  T(F): 98 (30 May 2024 09:26), Max: 98.2 (30 May 2024 00:47)  HR: 87 (30 May 2024 09:26) (48 - 87)  BP: 110/56 (30 May 2024 09:26) (99/58 - 112/65)  BP(mean): --  RR: 18 (30 May 2024 09:26) (10 - 18)  SpO2: 100% (30 May 2024 09:26) (96% - 100%)    Parameters below as of 30 May 2024 09:26  Patient On (Oxygen Delivery Method): room air      I&O's Summary    29 May 2024 07:01  -  30 May 2024 07:00  --------------------------------------------------------  IN: 3720 mL / OUT: 1000 mL / NET: 2720 mL        [X ] NO APPARENT ANESTHESIA COMPLICATIONS      Comments:

## 2024-05-30 NOTE — PROGRESS NOTE ADULT - ASSESSMENT
62 yo man with history of HTN, HLD, type 2 DM (on insulin), hyperthyroidism, asthma, GERD, and RAMANA (not on CPAP) presents with 2 weeks of painless jaundice, found to have  a 3.5 x 4.5 x 3.0 cm heterogenous solid mass in the pancreatic head/uncinate process with extrahepatic and intrahepatic biliary dilatation. Endocrinology consulted for assistance with management of uncontrolled DM2 w/ hyperglycemia and hypoglycemia iso newly discovered pancreatic mass.   Patient is high risk with high level decision making due to uncontrolled diabetes with A1C>9, new pancreatic mass and SOFIE which places patient at high risk for cardiovascular and cerebrovascular events. Patient with lability of glucose requiring close monitoring and insulin adjustments.    #Uncontrolled DM2 w/ hypo& hyperglycemia vs type 3 C DM  A1c 9%   newly found pancreatic mass   egfr 68 - SOFIE  higher basal/bolus insulin requirements at home but likely that po intake/diet is also much different   Recommendations:   - BG goal 100-180:   - 5/30 pt was npo yesterday for endoscopy. pt had dinner last night, but did not eat much for dinner. FS this morning was below goal, was 97 and serum glucose was 87. FS at lunch was 84.  - Decrease lantus to 5 units at bedtime.   - Continue to hold standing premeal insulin for now (pt is eating in between meals at times)  - Continue Admelog Low Correction Scale Premeal & Separate LOW Correction Scale Bedtime - change to q6h if npo   - FS Blood glucose monitoring Premeal/Bedtime - change to q6h if npo  - Hypoglycemia protocol   - Carb Consistent Diet   - Nutrition consult   - Check c-peptide in am with bmp     DC Planning: Basal/bolus insulin pens. Doses TBD closer to discharge pending insulin requirements/clinical course.  Please ensure patient has prescriptions for diabetes supplies (glucometer, test strips, lancets, alcohol swabs, insulin pen needles).   **Patient interested in CGM- Please send script for [employer] 3 sensors and reader (d/w primary team).  Routine outpatient opthalmology & podiatry evaluations recommended. Patient can follow up with endocrinology at the location provided below: D/w that he needs endocrinologist outpt and he expressed wanting to ask his PCP to recommend one but also wants our information in his discharge incase he wants to call to make an appointment.     [facility]   [address] [facility] [address]  [place] [address]  [phone]    #Hx of Hyperthyroidism  #Hx of Thyroid Nodule   - outpatient endocrine follow up  - not on Methimazole   - on atenolol   - TSH 5/17 wnl at 0.7  -Discharge on atenolol and follow up with PCP/endocrinologist to monitor TFT's and repeat them in 4-6 weeks.     #HTN  Recommendations:   - outpt BP goal < 130/80   - defer to primary team   - outpatient annual urinary microalb/cr ratio  - on atenolol     #HLD  Recommendations:   - LDL goal < 70   - defer to primary team   - outpatient fasting lipid profile   - on atorvastatin 40mg qhs     Metabolic Acidosis   defer to primary team   -resolved    PEYTON Coronado-BC  Nurse Practitioner  Division of Endocrinology & Diabetes  pager [phone]     If before 9AM or after 6PM, or on weekends/holidays, please call endocrine answering service for assistance ([phone]).For nonurgent matters email [email or web address] for [email or web address].    62 yo man with history of HTN, HLD, type 2 DM (on insulin), hyperthyroidism, asthma, GERD, and RAMANA (not on CPAP) presents with 2 weeks of painless jaundice, found to have  a 3.5 x 4.5 x 3.0 cm heterogenous solid mass in the pancreatic head/uncinate process with extrahepatic and intrahepatic biliary dilatation. Endocrinology consulted for assistance with management of uncontrolled DM2 w/ hyperglycemia and hypoglycemia iso newly discovered pancreatic mass.   Patient is high risk with high level decision making due to uncontrolled diabetes with A1C>9, new pancreatic mass and SOFIE which places patient at high risk for cardiovascular and cerebrovascular events. Patient with lability of glucose requiring close monitoring and insulin adjustments.    #Uncontrolled DM2 w/ hypo& hyperglycemia vs type 3 C DM  A1c 9%   newly found pancreatic mass   egfr 68 - SOFIE  higher basal/bolus insulin requirements at home but likely that po intake/diet is also much different   Recommendations:   - BG goal 100-180:   - 5/30 pt was npo yesterday for endoscopy. pt had dinner last night, but did not eat much for dinner. FS this morning was below goal, was 97 and serum glucose was 87. FS at lunch was 84.  - Decrease lantus to 5 units at bedtime.   - Continue to hold standing premeal insulin for now (pt is eating in between meals at times)  - Continue Admelog Low Correction Scale Premeal & Separate LOW Correction Scale Bedtime - change to q6h if npo   - FS Blood glucose monitoring Premeal/Bedtime - change to q6h if npo  - Hypoglycemia protocol   - Carb Consistent Diet   - Nutrition consult   - c-peptide 2.8 with glucose of 207- pt making his own insulin    DC Planning: Basal/bolus insulin pens. Doses TBD closer to discharge pending insulin requirements/clinical course.  Please ensure patient has prescriptions for diabetes supplies (glucometer, test strips, lancets, alcohol swabs, insulin pen needles).   **Patient interested in CGM- Please send script for [employer] 3 sensors and reader (d/w primary team).  Routine outpatient opthalmology & podiatry evaluations recommended. Patient can follow up with endocrinology at the location provided below: D/w that he needs endocrinologist outpt and he expressed wanting to ask his PCP to recommend one but also wants our information in his discharge incase he wants to call to make an appointment.     [facility]   [address] [place] [address] [place] [address]  [phone]    #Hx of Hyperthyroidism  #Hx of Thyroid Nodule   - outpatient endocrine follow up  - not on Methimazole   - on atenolol   - TSH 5/17 wnl at 0.7  -Discharge on atenolol and follow up with PCP/endocrinologist to monitor TFT's and repeat them in 4-6 weeks.     #HTN  Recommendations:   - outpt BP goal < 130/80   - defer to primary team   - outpatient annual urinary microalb/cr ratio  - on atenolol     #HLD  Recommendations:   - LDL goal < 70   - defer to primary team   - outpatient fasting lipid profile   - on atorvastatin 40mg qhs     Metabolic Acidosis   defer to primary team   -resolved    PEYTON Coronado-BC  Nurse Practitioner  Division of Endocrinology & Diabetes  pager [phone]     If before 9AM or after 6PM, or on weekends/holidays, please call endocrine answering service for assistance ([phone]).For nonurgent matters email [email or web address] for assistance.

## 2024-05-30 NOTE — PROGRESS NOTE ADULT - SUBJECTIVE AND OBJECTIVE BOX
Chief Complaint:  Patient is a 63y old  Male who presents with a chief complaint of Jaundice, pancreatic mass (30 May 2024 08:53)     Interval Events:   Afebrile and stable    Hospital Medications:  acetaminophen     Tablet .. 650 milliGRAM(s) Oral every 6 hours PRN  albuterol/ipratropium for Nebulization 3 milliLiter(s) Nebulizer every 6 hours PRN  atenolol  Tablet 50 milliGRAM(s) Oral daily  calamine/zinc oxide Lotion 1 Application(s) Topical three times a day PRN  cholestyramine Powder (Sugar-Free) 4 Gram(s) Oral daily  dextrose 50% Injectable 25 Gram(s) IV Push once  dextrose 50% Injectable 12.5 Gram(s) IV Push once  dextrose Oral Gel 15 Gram(s) Oral once PRN  famotidine    Tablet 40 milliGRAM(s) Oral daily  heparin   Injectable 5000 Unit(s) SubCutaneous every 8 hours  hydrOXYzine hydrochloride 25 milliGRAM(s) Oral every 8 hours PRN  insulin glargine Injectable (LANTUS) 7 Unit(s) SubCutaneous at bedtime  insulin lispro (ADMELOG) corrective regimen sliding scale   SubCutaneous at bedtime  insulin lispro (ADMELOG) corrective regimen sliding scale   SubCutaneous three times a day before meals  lidocaine   4% Patch 1 Patch Transdermal daily  petrolatum white Ointment 1 Application(s) Topical every 6 hours PRN  polyethylene glycol 3350 17 Gram(s) Oral daily  potassium chloride    Tablet ER 20 milliEquivalent(s) Oral every 2 hours  potassium phosphate / sodium phosphate Tablet (K-PHOS No. 2) 1 Tablet(s) Oral once  senna 2 Tablet(s) Oral at bedtime  ursodiol Tablet 250 milliGRAM(s) Oral two times a day      ROS:   Complete and normal except as mentioned above.    PHYSICAL EXAM:   Vital Signs:  Vital Signs Last 24 Hrs  T(C): 36.7 (30 May 2024 09:26), Max: 36.8 (30 May 2024 00:47)  T(F): 98 (30 May 2024 09:26), Max: 98.2 (30 May 2024 00:47)  HR: 87 (30 May 2024 09:26) (48 - 87)  BP: 110/56 (30 May 2024 09:26) (99/58 - 112/65)  BP(mean): --  RR: 18 (30 May 2024 09:26) (10 - 18)  SpO2: 100% (30 May 2024 09:26) (96% - 100%)    Parameters below as of 30 May 2024 09:26  Patient On (Oxygen Delivery Method): room air      Daily     Daily     GENERAL: no acute distress  NEURO: aox3  HEENT: anicteric sclera, no conjunctival pallor appreciated  CHEST: no respiratory distress, no accessory muscle use  CARDIAC: regular rate   ABDOMEN: soft, non-tender, non-distended, no rebound or guarding  EXTREMITIES: warm, well perfused, no edema  SKIN: no lesions noted    LABS:                          9.9    13.39 )-----------( 283      ( 30 May 2024 06:08 )             26.9     05-30    140  |  108<H>  |  34<H>  ----------------------------<  87  3.5   |  18<L>  |  1.24    Ca    9.2      30 May 2024 06:08  Phos  2.8     05-30  Mg     2.10     05-30    TPro  6.1  /  Alb  2.4<L>  /  TBili  24.7<H>  /  DBili  x   /  AST  102<H>  /  ALT  45<H>  /  AlkPhos  526<H>  05-30    LIVER FUNCTIONS - ( 30 May 2024 06:08 )  Alb: 2.4 g/dL / Pro: 6.1 g/dL / ALK PHOS: 526 U/L / ALT: 45 U/L / AST: 102 U/L / GGT: x             Interval Diagnostic Studies:   < from: ERCP (05.29.24 @ 08:48) >  Findings:       A  film of the abdomen was obtained. Surgical clips, consistent        with a previous cholecystectomy, were seen in the area of the right        upper quadrant of the abdomen. The esophagus was successfully intubated        under direct vision without detailed examination of the pharynx, larynx,       and associated structures, and upper GI tract. The upper GI tract was        grossly unremarkable. Major papilla is notable for erythema with        evidence of a prior biliary sphincterotomy. The sphincterotomy appeared        open. A short 0.035 inch Soft Jagwire was passed into the biliary tree.        The short-nosed traction sphincterotome was passed over the guidewire        and the bile duct was then deeply cannulated. Contrast was injected. I        personally interpreted the bile duct images. Ductal flow of contrast was        adequate. Image quality was adequate. Contrast extended to the hepatic        ducts. The lower third of the main bile duct contained a single stenosis        from know pancreatic ca with upstream dilation. One 10 mm x 60 mm        WallFlex Uncovered metal stent was placed into the common bile duct.        Bile flowed through the stent. The stent was in good position. The        endoscope was withdrawn from the patient. Indomethacin 100 mg was given        via suppository to decrease the risk of post-ERCP pancreatitis (PEP). A        post procedure film showed no free air under the diaphragm.                                                                                   Impression:          - Biliary stricture s/p stent placement (10 mm x 60 mm                        uncovered metal stent).  Recommendation:      - Return patient to hospital [facility] for ongoing care.                       - Clear liquid diet today.                                                        ___________________  WINNIE REYES MD  5/29/2024 11:57:20 AM  Number of Addenda: 0    Note Initiated On: 5/29/2024 8:48 AM    < end of copied text >     Chief Complaint:  Patient is a 63y old  Male who presents with a chief complaint of Jaundice, pancreatic mass (30 May 2024 08:53)     Interval Events:   Afebrile and stable  Denying n/v/abdominal pain    Hospital Medications:  acetaminophen     Tablet .. 650 milliGRAM(s) Oral every 6 hours PRN  albuterol/ipratropium for Nebulization 3 milliLiter(s) Nebulizer every 6 hours PRN  atenolol  Tablet 50 milliGRAM(s) Oral daily  calamine/zinc oxide Lotion 1 Application(s) Topical three times a day PRN  cholestyramine Powder (Sugar-Free) 4 Gram(s) Oral daily  dextrose 50% Injectable 25 Gram(s) IV Push once  dextrose 50% Injectable 12.5 Gram(s) IV Push once  dextrose Oral Gel 15 Gram(s) Oral once PRN  famotidine    Tablet 40 milliGRAM(s) Oral daily  heparin   Injectable 5000 Unit(s) SubCutaneous every 8 hours  hydrOXYzine hydrochloride 25 milliGRAM(s) Oral every 8 hours PRN  insulin glargine Injectable (LANTUS) 7 Unit(s) SubCutaneous at bedtime  insulin lispro (ADMELOG) corrective regimen sliding scale   SubCutaneous at bedtime  insulin lispro (ADMELOG) corrective regimen sliding scale   SubCutaneous three times a day before meals  lidocaine   4% Patch 1 Patch Transdermal daily  petrolatum white Ointment 1 Application(s) Topical every 6 hours PRN  polyethylene glycol 3350 17 Gram(s) Oral daily  potassium chloride    Tablet ER 20 milliEquivalent(s) Oral every 2 hours  potassium phosphate / sodium phosphate Tablet (K-PHOS No. 2) 1 Tablet(s) Oral once  senna 2 Tablet(s) Oral at bedtime  ursodiol Tablet 250 milliGRAM(s) Oral two times a day      ROS:   Complete and normal except as mentioned above.    PHYSICAL EXAM:   Vital Signs:  Vital Signs Last 24 Hrs  T(C): 36.7 (30 May 2024 09:26), Max: 36.8 (30 May 2024 00:47)  T(F): 98 (30 May 2024 09:26), Max: 98.2 (30 May 2024 00:47)  HR: 87 (30 May 2024 09:26) (48 - 87)  BP: 110/56 (30 May 2024 09:26) (99/58 - 112/65)  BP(mean): --  RR: 18 (30 May 2024 09:26) (10 - 18)  SpO2: 100% (30 May 2024 09:26) (96% - 100%)    Parameters below as of 30 May 2024 09:26  Patient On (Oxygen Delivery Method): room air      Daily     Daily     GENERAL: no acute distress  NEURO: aox3  HEENT: scleral icterus  CHEST: no respiratory distress, no accessory muscle use  CARDIAC: regular rate   ABDOMEN: soft, non-tender, non-distended, no rebound or guarding  EXTREMITIES: warm, well perfused, no edema  SKIN: jaundice    LABS:                          9.9    13.39 )-----------( 283      ( 30 May 2024 06:08 )             26.9     05-30    140  |  108<H>  |  34<H>  ----------------------------<  87  3.5   |  18<L>  |  1.24    Ca    9.2      30 May 2024 06:08  Phos  2.8     05-30  Mg     2.10     05-30    TPro  6.1  /  Alb  2.4<L>  /  TBili  24.7<H>  /  DBili  x   /  AST  102<H>  /  ALT  45<H>  /  AlkPhos  526<H>  05-30    LIVER FUNCTIONS - ( 30 May 2024 06:08 )  Alb: 2.4 g/dL / Pro: 6.1 g/dL / ALK PHOS: 526 U/L / ALT: 45 U/L / AST: 102 U/L / GGT: x             Interval Diagnostic Studies:   < from: ERCP (05.29.24 @ 08:48) >  Findings:       A  film of the abdomen was obtained. Surgical clips, consistent        with a previous cholecystectomy, were seen in the area of the right        upper quadrant of the abdomen. The esophagus was successfully intubated        under direct vision without detailed examination of the pharynx, larynx,       and associated structures, and upper GI tract. The upper GI tract was        grossly unremarkable. Major papilla is notable for erythema with        evidence of a prior biliary sphincterotomy. The sphincterotomy appeared        open. A short 0.035 inch Soft Jagwire was passed into the biliary tree.        The short-nosed traction sphincterotome was passed over the guidewire        and the bile duct was then deeply cannulated. Contrast was injected. I        personally interpreted the bile duct images. Ductal flow of contrast was        adequate. Image quality was adequate. Contrast extended to the hepatic        ducts. The lower third of the main bile duct contained a single stenosis        from know pancreatic ca with upstream dilation. One 10 mm x 60 mm        WallFlex Uncovered metal stent was placed into the common bile duct.        Bile flowed through the stent. The stent was in good position. The        endoscope was withdrawn from the patient. Indomethacin 100 mg was given        via suppository to decrease the risk of post-ERCP pancreatitis (PEP). A        post procedure film showed no free air under the diaphragm.                                                                                   Impression:          - Biliary stricture s/p stent placement (10 mm x 60 mm                        uncovered metal stent).  Recommendation:      - Return patient to hospital [facility] for ongoing care.                       - Clear liquid diet today.                                                        ___________________  WINNIE REYES MD  5/29/2024 11:57:20 AM  Number of Addenda: 0    Note Initiated On: 5/29/2024 8:48 AM    < end of copied text >

## 2024-05-30 NOTE — PROGRESS NOTE ADULT - SUBJECTIVE AND OBJECTIVE BOX
D/E TEAM Surgery Progress Note  Patient is a 63y old  Male who presents with a chief complaint of Jaundice, pancreatic mass (29 May 2024 06:58)    INTERVAL EVENTS: Overnight, pt with subjective chills. Afebrile,     SUBJECTIVE: Patient seen and examined at bedside with surgical team, patient without complaints. Chills have improved. Denies CP, SOB nausea, vomiting, abdominal pain.    OBJECTIVE:    Vital Signs Last 24 Hrs  T(C): 36.8 (30 May 2024 04:50), Max: 36.8 (30 May 2024 00:47)  T(F): 98.2 (30 May 2024 04:50), Max: 98.2 (30 May 2024 00:47)  HR: 87 (30 May 2024 04:50) (46 - 87)  BP: 99/58 (30 May 2024 04:50) (99/58 - 112/65)  BP(mean): --  RR: 18 (30 May 2024 04:50) (10 - 18)  SpO2: 96% (30 May 2024 04:50) (96% - 100%)    Parameters below as of 30 May 2024 04:50  Patient On (Oxygen Delivery Method): room air    I&O's Detail    29 May 2024 07:01  -  30 May 2024 07:00  --------------------------------------------------------  IN:    dextrose 5% + sodium chloride 0.45%: 300 mL    Lactated Ringers: 2850 mL    Oral Fluid: 570 mL  Total IN: 3720 mL    OUT:    Voided (mL): 1000 mL  Total OUT: 1000 mL    Total NET: 2720 mL      MEDICATIONS  (STANDING):  atenolol  Tablet 50 milliGRAM(s) Oral daily  cholestyramine Powder (Sugar-Free) 4 Gram(s) Oral daily  dextrose 50% Injectable 25 Gram(s) IV Push once  dextrose 50% Injectable 12.5 Gram(s) IV Push once  famotidine    Tablet 40 milliGRAM(s) Oral daily  heparin   Injectable 5000 Unit(s) SubCutaneous every 8 hours  insulin glargine Injectable (LANTUS) 7 Unit(s) SubCutaneous at bedtime  insulin lispro (ADMELOG) corrective regimen sliding scale   SubCutaneous at bedtime  insulin lispro (ADMELOG) corrective regimen sliding scale   SubCutaneous three times a day before meals  lidocaine   4% Patch 1 Patch Transdermal daily  polyethylene glycol 3350 17 Gram(s) Oral daily  potassium chloride    Tablet ER 20 milliEquivalent(s) Oral every 2 hours  potassium phosphate / sodium phosphate Tablet (K-PHOS No. 2) 1 Tablet(s) Oral once  senna 2 Tablet(s) Oral at bedtime  ursodiol Tablet 250 milliGRAM(s) Oral two times a day    MEDICATIONS  (PRN):  acetaminophen     Tablet .. 650 milliGRAM(s) Oral every 6 hours PRN Temp greater or equal to 38C (100.4F), Mild Pain (1 - 3), Moderate Pain (4 - 6), Severe Pain (7 - 10)  albuterol/ipratropium for Nebulization 3 milliLiter(s) Nebulizer every 6 hours PRN Shortness of Breath and/or Wheezing  calamine/zinc oxide Lotion 1 Application(s) Topical three times a day PRN Itching  dextrose Oral Gel 15 Gram(s) Oral once PRN Blood Glucose LESS THAN 70 milliGRAM(s)/deciliter  hydrOXYzine hydrochloride 25 milliGRAM(s) Oral every 8 hours PRN Itching  petrolatum white Ointment 1 Application(s) Topical every 6 hours PRN Dry lips      PHYSICAL EXAM:  Constitutional: A&Ox3, NAD  Respiratory: Unlabored breathing  Abdomen: Soft, nondistended, NTTP. No rebound or guarding.  Extremities: WWP, HATFIELD spontaneously    LABS:                        9.9    13.39 )-----------( 283      ( 30 May 2024 06:08 )             26.9     05-30    140  |  108<H>  |  34<H>  ----------------------------<  87  3.5   |  18<L>  |  1.24    Ca    9.2      30 May 2024 06:08  Phos  2.8     05-30  Mg     2.10     05-30    TPro  6.1  /  Alb  2.4<L>  /  TBili  24.7<H>  /  DBili  x   /  AST  102<H>  /  ALT  45<H>  /  AlkPhos  526<H>  05-30    PT/INR - ( 29 May 2024 05:33 )   PT: 21.2 sec;   INR: 1.93 ratio         PTT - ( 29 May 2024 05:33 )  PTT:39.8 sec  LIVER FUNCTIONS - ( 30 May 2024 06:08 )  Alb: 2.4 g/dL / Pro: 6.1 g/dL / ALK PHOS: 526 U/L / ALT: 45 U/L / AST: 102 U/L / GGT: x           Urinalysis Basic - ( 30 May 2024 06:08 )    Color: x / Appearance: x / SG: x / pH: x  Gluc: 87 mg/dL / Ketone: x  / Bili: x / Urobili: x   Blood: x / Protein: x / Nitrite: x   Leuk Esterase: x / RBC: x / WBC x   Sq Epi: x / Non Sq Epi: x / Bacteria: x

## 2024-05-31 ENCOUNTER — OUTPATIENT (OUTPATIENT)
Dept: OUTPATIENT SERVICES | Facility: HOSPITAL | Age: 64
LOS: 1 days | Discharge: HOME | End: 2024-05-31

## 2024-05-31 ENCOUNTER — TRANSCRIPTION ENCOUNTER (OUTPATIENT)
Age: 64
End: 2024-05-31

## 2024-05-31 VITALS
RESPIRATION RATE: 18 BRPM | TEMPERATURE: 98 F | OXYGEN SATURATION: 100 % | HEART RATE: 56 BPM | SYSTOLIC BLOOD PRESSURE: 105 MMHG | DIASTOLIC BLOOD PRESSURE: 58 MMHG

## 2024-05-31 DIAGNOSIS — Z90.49 ACQUIRED ABSENCE OF OTHER SPECIFIED PARTS OF DIGESTIVE TRACT: Chronic | ICD-10-CM

## 2024-05-31 DIAGNOSIS — C25.9 MALIGNANT NEOPLASM OF PANCREAS, UNSPECIFIED: ICD-10-CM

## 2024-05-31 LAB
ALBUMIN SERPL ELPH-MCNC: 2.3 G/DL — LOW (ref 3.3–5)
ALP SERPL-CCNC: 461 U/L — HIGH (ref 40–120)
ALT FLD-CCNC: 40 U/L — SIGNIFICANT CHANGE UP (ref 4–41)
ANION GAP SERPL CALC-SCNC: 15 MMOL/L — HIGH (ref 7–14)
AST SERPL-CCNC: 84 U/L — HIGH (ref 4–40)
BILIRUB SERPL-MCNC: 22.2 MG/DL — HIGH (ref 0.2–1.2)
BUN SERPL-MCNC: 31 MG/DL — HIGH (ref 7–23)
CALCIUM SERPL-MCNC: 9 MG/DL — SIGNIFICANT CHANGE UP (ref 8.4–10.5)
CHLORIDE SERPL-SCNC: 108 MMOL/L — HIGH (ref 98–107)
CO2 SERPL-SCNC: 16 MMOL/L — LOW (ref 22–31)
CREAT SERPL-MCNC: 1.38 MG/DL — HIGH (ref 0.5–1.3)
EGFR: 57 ML/MIN/1.73M2 — LOW
GLUCOSE BLDC GLUCOMTR-MCNC: 108 MG/DL — HIGH (ref 70–99)
GLUCOSE BLDC GLUCOMTR-MCNC: 127 MG/DL — HIGH (ref 70–99)
GLUCOSE SERPL-MCNC: 107 MG/DL — HIGH (ref 70–99)
HCT VFR BLD CALC: 25.9 % — LOW (ref 39–50)
HGB BLD-MCNC: 9.4 G/DL — LOW (ref 13–17)
MAGNESIUM SERPL-MCNC: 1.9 MG/DL — SIGNIFICANT CHANGE UP (ref 1.6–2.6)
MCHC RBC-ENTMCNC: 31.2 PG — SIGNIFICANT CHANGE UP (ref 27–34)
MCHC RBC-ENTMCNC: 36.3 GM/DL — HIGH (ref 32–36)
MCV RBC AUTO: 86 FL — SIGNIFICANT CHANGE UP (ref 80–100)
NRBC # BLD: 0 /100 WBCS — SIGNIFICANT CHANGE UP (ref 0–0)
NRBC # FLD: 0.02 K/UL — HIGH (ref 0–0)
PHOSPHATE SERPL-MCNC: 3.7 MG/DL — SIGNIFICANT CHANGE UP (ref 2.5–4.5)
PLATELET # BLD AUTO: 261 K/UL — SIGNIFICANT CHANGE UP (ref 150–400)
POTASSIUM SERPL-MCNC: 3.8 MMOL/L — SIGNIFICANT CHANGE UP (ref 3.5–5.3)
POTASSIUM SERPL-SCNC: 3.8 MMOL/L — SIGNIFICANT CHANGE UP (ref 3.5–5.3)
PROT SERPL-MCNC: 5.5 G/DL — LOW (ref 6–8.3)
RBC # BLD: 3.01 M/UL — LOW (ref 4.2–5.8)
RBC # FLD: 23.5 % — HIGH (ref 10.3–14.5)
SODIUM SERPL-SCNC: 139 MMOL/L — SIGNIFICANT CHANGE UP (ref 135–145)
WBC # BLD: 11.23 K/UL — HIGH (ref 3.8–10.5)
WBC # FLD AUTO: 11.23 K/UL — HIGH (ref 3.8–10.5)

## 2024-05-31 PROCEDURE — 99232 SBSQ HOSP IP/OBS MODERATE 35: CPT

## 2024-05-31 PROCEDURE — 74018 RADEX ABDOMEN 1 VIEW: CPT | Mod: 26

## 2024-05-31 PROCEDURE — 99238 HOSP IP/OBS DSCHRG MGMT 30/<: CPT

## 2024-05-31 RX ORDER — INSULIN GLARGINE 100 [IU]/ML
50 INJECTION, SOLUTION SUBCUTANEOUS
Refills: 0 | DISCHARGE

## 2024-05-31 RX ORDER — ATENOLOL 25 MG/1
1 TABLET ORAL
Refills: 0 | DISCHARGE

## 2024-05-31 RX ORDER — URSODIOL 250 MG/1
1 TABLET, FILM COATED ORAL
Qty: 60 | Refills: 0
Start: 2024-05-31 | End: 2024-06-29

## 2024-05-31 RX ORDER — CHOLESTYRAMINE 4 G/9G
4 POWDER, FOR SUSPENSION ORAL
Qty: 30 | Refills: 0
Start: 2024-05-31 | End: 2024-06-29

## 2024-05-31 RX ORDER — LISINOPRIL 2.5 MG/1
1 TABLET ORAL
Qty: 0 | Refills: 0 | DISCHARGE

## 2024-05-31 RX ORDER — INSULIN GLARGINE 100 [IU]/ML
5 INJECTION, SOLUTION SUBCUTANEOUS
Qty: 1 | Refills: 0
Start: 2024-05-31 | End: 2024-06-29

## 2024-05-31 RX ORDER — POLYETHYLENE GLYCOL 3350 17 G/17G
17 POWDER, FOR SOLUTION ORAL
Qty: 0 | Refills: 0 | DISCHARGE
Start: 2024-05-31

## 2024-05-31 RX ORDER — INSULIN GLARGINE 100 [IU]/ML
5 INJECTION, SOLUTION SUBCUTANEOUS
Qty: 0 | Refills: 0 | DISCHARGE
Start: 2024-05-31

## 2024-05-31 RX ORDER — HYDROXYZINE HCL 10 MG
1 TABLET ORAL
Qty: 42 | Refills: 0
Start: 2024-05-31 | End: 2024-06-13

## 2024-05-31 RX ORDER — SENNA PLUS 8.6 MG/1
2 TABLET ORAL
Qty: 0 | Refills: 0 | DISCHARGE
Start: 2024-05-31

## 2024-05-31 RX ORDER — ISOPROPYL ALCOHOL, BENZOCAINE .7; .06 ML/ML; ML/ML
0 SWAB TOPICAL
Qty: 100 | Refills: 1
Start: 2024-05-31

## 2024-05-31 RX ORDER — INSULIN LISPRO 100/ML
50 VIAL (ML) SUBCUTANEOUS
Refills: 0 | DISCHARGE

## 2024-05-31 RX ORDER — ACETAMINOPHEN 500 MG
2 TABLET ORAL
Qty: 0 | Refills: 0 | DISCHARGE
Start: 2024-05-31

## 2024-05-31 RX ADMIN — FAMOTIDINE 40 MILLIGRAM(S): 10 INJECTION INTRAVENOUS at 11:34

## 2024-05-31 RX ADMIN — HEPARIN SODIUM 5000 UNIT(S): 5000 INJECTION INTRAVENOUS; SUBCUTANEOUS at 14:09

## 2024-05-31 RX ADMIN — CHOLESTYRAMINE 4 GRAM(S): 4 POWDER, FOR SUSPENSION ORAL at 08:05

## 2024-05-31 RX ADMIN — URSODIOL 250 MILLIGRAM(S): 250 TABLET, FILM COATED ORAL at 05:43

## 2024-05-31 NOTE — PROGRESS NOTE ADULT - PROBLEM SELECTOR PROBLEM 2
Pancreatic mass

## 2024-05-31 NOTE — PROGRESS NOTE ADULT - ASSESSMENT
63M PMH HTN, T2DM (on insulin), hemorrhoid, hyperthyroidism, asthma, GERD, and RAMANA (not on CPAP), surgical history of open cholecystectomy 15 years ago and appendectomy in 1975, p/w 6 weeks of progressive fatigue, painless jaundice, malodorous loose stools, worsening glycemic control, weight loss, pruritus who was found to have a pancreatic head/uncinate neck mass with upstream pancreatic and biliary ductal dilatation with elevated tumor CA-19-9 and CEA concerning for pancreatic adenocarcinoma, bx confirms diagnosis.  S/p ERCP and stent on 5/22. bx c/w pancreatic adenocarcinoma. Repeat MRCP/KUB on 5/28 without evidence of stent, possibly dislodged. Now s/p repeat ERCP and stent placement on 5/29.    PLAN  - trend cbc i/s/o dark stools on 5/31  - trend Tbili post ERCP with stent  - Diet: Regular CC  - IVL  - Pain control: Tylenol  - Continue home medications  - OOB, ambulate as tolerated  - VTE ppx: sqh, scd's    E Team Surgery  [id number].

## 2024-05-31 NOTE — DISCHARGE NOTE NURSING/CASE MANAGEMENT/SOCIAL WORK - PATIENT PORTAL LINK FT
You can access the FollowMyHealth Patient Portal offered by [facility] by registering at the following website: [email or web address]. By joining [facility]’s FollowMyHealth portal, you will also be able to view your health information using other applications (apps) compatible with our system.

## 2024-05-31 NOTE — PROGRESS NOTE ADULT - PROVIDER SPECIALTY LIST ADULT
Gastroenterology
Surgery
Anesthesia
Endocrinology
Gastroenterology
Internal Medicine
Surgery
Surgery
Endocrinology
Gastroenterology
Surgery
Internal Medicine
Surgery
Endocrinology
Internal Medicine

## 2024-05-31 NOTE — DISCHARGE NOTE NURSING/CASE MANAGEMENT/SOCIAL WORK - NSDCFUADDAPPT_GEN_ALL_CORE_FT
Gastroenterology Clinic to confirm appointment; [phone] ([facility] at [address] [facility]) or [phone] ([facility] [facility]) or [phone] ([facility] [address] [facility]).     [facility]inology [facility] [address] [facility] [address] [place], [place] [address] [phone]

## 2024-05-31 NOTE — PROGRESS NOTE ADULT - ATTENDING COMMENTS
CT performed showing extensive intrahepatic pneumobilia suggesting stent patency  No immediate plans for endoscopic intervention at this time  Continue to trend liver enzymes. If continues to rise, obtain MRCP
Downward trend of bilirubin.  Resolving SOFIE.  Awaiting MRCP for stent evaluation.  DC planning pending continued blood work improvement.
Repeat ERCP with uncovered metal stent yesterday.  Bilirubin without change this morning.  Needs to trend downward prior to safe discharge home.  Discussed with patient,
Agree with above   Pancreatic Mass s/p EUS-FNB (Path pending) and ERCP with sphincterotomy + placement of SEMS  Continue to trend bili
Bilirubin without improvement despite CBD stent placement 4 days ago.  Await MRCP.
ERCP/EUS with stent and FNB yesterday.  Will continue to trend bilirubin.  Plan for discharge with follow up at [facility] to discuss neoadjuvant treatment once PDAC confirmed.
Imaging consistent with stent migration.  Repeat ERCP today.
Bilirubin and alkaline phosphates trending down.  KUB shows expanded CBD stent in expected location.  Plan for discharge home today with PMDC visit next week.
Bilirubin remains high.  CT reviewed, despite pneumobilia, concern for stent occlusion - will obtain MRCP at advanced GI request.  Transfer to surgical service.
Downward trend of bilirubin this AM.  Follow up MRCP with expectant discharge if no emergent findings.
Pancreatic cancer s/p placement of FCSEMS complicated by migration. ERCP repeat yesterday with removal of debris and placement of uncovered SEMS.
SOFIE.  Bilirubin has not had significant downward trend.  GI follow up.
plan for GI procedure in am.  denies pain or pruritis.
pt with obstructive jaundice s/p stent on 5/21.  He  feels much better - denies pain.  Biopsies pending.  Continue to trend hyperbilirubinemia.  Pt with hyperglycemia due to uncontrolled DM - appreciate endo recs - will increase basal insulin and start premeal tid - home regimen TBD.  d/c planning.
pt with obstructive jaundice due to pancreatic head mass.  GI eval pending - likely would need ercp/eus, +/- stent.
Pt with hypoglycemia noted earlier in the day.  Pt given vit K but no improvement in INR.  Follow up with GI regarding procedure.
Await labs from today - would do CT a/p since no downtrend in bili - pt denies abd pain or n/v.  He is tolerating po and feels he was hydrating himself adequately.  Renal US neg.  If SOFIE worsens, then would obtain renal eval.
Pt with obstructive jaundice due to pancreatic head mass - plan for EUS/ERCP/stent placement today.
63M DM2, HTN, hyperthyroid, RAMANA, p/w pancreatic CA w/ obstructive jaundice s/p ERCP w/ stent 5/21 c/b SOFIE, continued elevation of bilirubin, hypoglycemia.    Pancreatic CA with obstructive jaundice  - s/p ERCP with stent on 5/21 - failed to resolve the obstruction and hyperbilirubinemia.   - appreciate surgical oncology consult - Surgical Oncology team to assume care of the patient as primary team with intent for surgical intervention  - MRCP pending    DM2 with hypoglycemia  - improved with juice  - decrease lantus, admelog pre-meal  - FS QID, AISS  - Goal -180
Pt continues to have hyperbilirubinemia despite stenting - will speak to GI.  Pt also with SOFIE today - likely prerenal - give IVF and check UA, renal sono.  Recheck bmp this afternoon.

## 2024-05-31 NOTE — PROGRESS NOTE ADULT - SUBJECTIVE AND OBJECTIVE BOX
TEAM [ D/E ] Surgery Daily Progress Note  =====================================================    SUBJECTIVE: Patient seen and examined at bedside on AM rounds. Patient reports feeling tired, pain as controlled. No bloody bowel movements overnight.    ALLERGIES:  No Known Allergies    --------------------------------------------------------------------------------------    MEDICATIONS:    Neurologic Medications  acetaminophen     Tablet .. 650 milliGRAM(s) Oral every 6 hours PRN Temp greater or equal to 38C (100.4F), Mild Pain (1 - 3), Moderate Pain (4 - 6), Severe Pain (7 - 10)  hydrOXYzine hydrochloride 25 milliGRAM(s) Oral every 8 hours PRN Itching    Respiratory Medications  albuterol/ipratropium for Nebulization 3 milliLiter(s) Nebulizer every 6 hours PRN Shortness of Breath and/or Wheezing    Cardiovascular Medications  atenolol  Tablet 50 milliGRAM(s) Oral daily    Gastrointestinal Medications  famotidine    Tablet 40 milliGRAM(s) Oral daily  polyethylene glycol 3350 17 Gram(s) Oral daily  senna 2 Tablet(s) Oral at bedtime  ursodiol Tablet 250 milliGRAM(s) Oral two times a day    Hematologic/Oncologic Medications  heparin   Injectable 5000 Unit(s) SubCutaneous every 8 hours    Endocrine/Metabolic Medications  cholestyramine Powder (Sugar-Free) 4 Gram(s) Oral daily  dextrose 50% Injectable 12.5 Gram(s) IV Push once  dextrose 50% Injectable 25 Gram(s) IV Push once  dextrose Oral Gel 15 Gram(s) Oral once PRN Blood Glucose LESS THAN 70 milliGRAM(s)/deciliter  insulin glargine Injectable (LANTUS) 5 Unit(s) SubCutaneous at bedtime  insulin lispro (ADMELOG) corrective regimen sliding scale   SubCutaneous at bedtime  insulin lispro (ADMELOG) corrective regimen sliding scale   SubCutaneous three times a day before meals    Topical/Other Medications  calamine/zinc oxide Lotion 1 Application(s) Topical three times a day PRN Itching  lidocaine   4% Patch 1 Patch Transdermal daily  petrolatum white Ointment 1 Application(s) Topical every 6 hours PRN Dry lips    --------------------------------------------------------------------------------------    VITAL SIGNS:  T(C): 36.8 (05-31-24 @ 04:56), Max: 36.8 (05-30-24 @ 20:54)  HR: 55 (05-31-24 @ 04:56) (51 - 87)  BP: 115/62 (05-31-24 @ 04:56) (95/50 - 115/62)  RR: 18 (05-31-24 @ 04:56) (18 - 18)  SpO2: 99% (05-31-24 @ 04:56) (95% - 100%)  --------------------------------------------------------------------------------------    EXAM    General: NAD, resting in bed comfortably.  Cardiac: regular rate, warm and well perfused  Respiratory: Nonlabored respirations, normal cw expansion.  Abdomen: soft, nontender, nondistended.  Extremities: normal strength, FROM, no deformities    --------------------------------------------------------------------------------------    LABS      --------------------------------------------------------------------------------------    INS AND OUTS:    05-30-24 @ 07:01  -  05-31-24 @ 07:00  --------------------------------------------------------  IN: 90 mL / OUT: 400 mL / NET: -310 mL      --------------------------------------------------------------------------------------

## 2024-05-31 NOTE — PROGRESS NOTE ADULT - ATTENDING SUPERVISION STATEMENT
Fellow
Resident
Fellow
Resident
Fellow
Resident

## 2024-05-31 NOTE — PROGRESS NOTE ADULT - PROBLEM SELECTOR PROBLEM 1
Uncontrolled type 2 diabetes mellitus with hyperglycemia
Cholestatic jaundice
Cholestatic jaundice
Uncontrolled type 2 diabetes mellitus with hyperglycemia
Cholestatic jaundice
Uncontrolled type 2 diabetes mellitus with hyperglycemia
Uncontrolled type 2 diabetes mellitus with hyperglycemia
Cholestatic jaundice
Uncontrolled type 2 diabetes mellitus with hyperglycemia
Uncontrolled type 2 diabetes mellitus with hyperglycemia
Cholestatic jaundice
No

## 2024-05-31 NOTE — PROGRESS NOTE ADULT - ASSESSMENT
64 yo man with history of HTN, HLD, type 2 DM (on insulin), hyperthyroidism, asthma, GERD, and RAMANA (not on CPAP) presents with 2 weeks of painless jaundice, found to have  a 3.5 x 4.5 x 3.0 cm heterogenous solid mass in the pancreatic head/uncinate process with extrahepatic and intrahepatic biliary dilatation. Endocrinology consulted for assistance with management of uncontrolled DM2 w/ hyperglycemia and hypoglycemia iso newly discovered pancreatic mass.   Patient is high risk with high level decision making due to uncontrolled diabetes with A1C>9, new pancreatic mass and SOFIE which places patient at high risk for cardiovascular and cerebrovascular events. Patient with lability of glucose requiring close monitoring and insulin adjustments.    #Uncontrolled DM2 w/ hypo& hyperglycemia vs type 3 C DM  A1c 9%   newly found pancreatic mass   egfr 68 - SOFIE  higher basal/bolus insulin requirements at home but likely that po intake/diet is also much different   Recommendations:   - BG goal 100-180:   - 5/31 pt has not been eating much here, FS at goal on low dose lantus and no premeal insulin.  - Continue lantus 5 units at bedtime.   - Continue to hold standing premeal insulin for now (pt is eating in between meals at times)  - Continue Admelog Low Correction Scale Premeal & Separate LOW Correction Scale Bedtime - change to q6h if npo   - FS Blood glucose monitoring Premeal/Bedtime - change to q6h if npo  - Hypoglycemia protocol   - Carb Consistent Diet   - Nutrition consult   - c-peptide 2.8 with glucose of 207- pt making his own insulin    DC Plan: Since pt PO intake has decreased and insulin requirements have as well while inpt will discharge pt on lantus 5 units at bedtime for now. Instructed pt that if FS consistently >180 in the morning to contact his PCP because his lantus dose will need to be increased and will need to add back premeal admelog as well. Please ensure patient has prescriptions for diabetes supplies (glucometer, test strips, lancets, alcohol swabs, insulin pen needles).   **Patient interested in CGM- Please send script for [employer] 3 sensors and reader (d/w primary team).  Routine outpatient opthalmology & podiatry evaluations recommended. Patient can follow up with endocrinology at the location provided below: D/w that he needs endocrinologist outpt and he expressed wanting to ask his PCP to recommend one but also wants our information in his discharge incase he wants to call to make an appointment. Also emailed the office to schedule a follow up appointment for him to have as well.     [facility]   [address]  [place] [address]  [phone]    #Hx of Hyperthyroidism  #Hx of Thyroid Nodule   - outpatient endocrine follow up  - not on Methimazole   - on atenolol   - TSH 5/17 wnl at 0.7  -HR has been bradycardic at times and pt not getting his atenolol.  -Discharge: STOP atenolol and follow up with PCP/endocrinologist to monitor TFT's and HR and repeat them in 4-6 weeks.     #HTN  Recommendations:   - outpt BP goal < 130/80   - defer to primary team   - outpatient annual urinary microalb/cr ratio  - on atenolol   -for discharge recommending to dc atenolol since HR has been bradycardic at times and TSH normal. BP has also been stable.     #HLD  Recommendations:   - LDL goal < 70   - defer to primary team   - outpatient fasting lipid profile   - on atorvastatin 40mg qhs     Metabolic Acidosis   defer to primary team   -resolved    d/w Primary team provider Kath.    MALINDA CoronadoKAYLA-BC  Nurse Practitioner  Division of Endocrinology & Diabetes  pager [phone]     If before 9AM or after 6PM, or on weekends/holidays, please call endocrine answering service for assistance ([phone]).For nonurgent matters email [email or web address] for assistance.    64 yo man with history of HTN, HLD, type 2 DM (on insulin), hyperthyroidism, asthma, GERD, and RAMANA (not on CPAP) presents with 2 weeks of painless jaundice, found to have  a 3.5 x 4.5 x 3.0 cm heterogenous solid mass in the pancreatic head/uncinate process with extrahepatic and intrahepatic biliary dilatation. Endocrinology consulted for assistance with management of uncontrolled DM2 w/ hyperglycemia and hypoglycemia iso newly discovered pancreatic mass.   Patient is high risk with high level decision making due to uncontrolled diabetes with A1C>9, new pancreatic mass and SOFIE which places patient at high risk for cardiovascular and cerebrovascular events. Patient with lability of glucose requiring close monitoring and insulin adjustments.    #Uncontrolled DM2 w/ hypo& hyperglycemia vs type 3 C DM  A1c 9%   newly found pancreatic mass   egfr 68 - SOFIE  higher basal/bolus insulin requirements at home but likely that po intake/diet is also much different   Recommendations:   - BG goal 100-180:   - 5/31 pt has not been eating much here, FS at goal on low dose lantus and no premeal insulin.  - Continue lantus 5 units at bedtime.   - Continue to hold standing premeal insulin for now (pt is eating in between meals at times)  - Continue Admelog Low Correction Scale Premeal & Separate LOW Correction Scale Bedtime - change to q6h if npo   - FS Blood glucose monitoring Premeal/Bedtime - change to q6h if npo  - Hypoglycemia protocol   - Carb Consistent Diet   - Nutrition consult   - c-peptide 2.8 with glucose of 207- pt making his own insulin    DC Plan: Since pt PO intake has decreased and insulin requirements have as well while inpt will discharge pt on lantus 5 units at bedtime for now. Instructed pt that if FS consistently >180 in the morning to contact his PCP because his lantus dose will need to be increased and will need to add back premeal admelog as well. Please ensure patient has prescriptions for diabetes supplies (glucometer, test strips, lancets, alcohol swabs, insulin pen needles).   **Patient interested in CGM- Primary team send hung 3 sensors and needs to send hung 3 reader, pt phone is not compatible with hung 3 abdulkadir. Offered to place hung 3 sensor for now and pt declined and said he would place it himself at home, left sample at bedside for the pt.   Routine outpatient opthalmology & podiatry evaluations recommended. Patient can follow up with endocrinology at the location provided below: D/w that he needs endocrinologist outpt and he expressed wanting to ask his PCP to recommend one but also wants our information in his discharge incase he wants to call to make an appointment. Also emailed the office to schedule a follow up appointment for him to have as well.     [facility]   [address]  [place] [address]  [phone]    #Hx of Hyperthyroidism  #Hx of Thyroid Nodule   - outpatient endocrine follow up  - not on Methimazole   - on atenolol   - TSH 5/17 wnl at 0.7  -HR has been bradycardic at times and pt not getting his atenolol.  -Discharge: STOP atenolol and follow up with PCP/endocrinologist to monitor TFT's and HR and repeat them in 4-6 weeks.     #HTN  Recommendations:   - outpt BP goal < 130/80   - defer to primary team   - outpatient annual urinary microalb/cr ratio  - on atenolol   -for discharge recommending to dc atenolol since HR has been bradycardic at times and TSH normal. BP has also been stable.     #HLD  Recommendations:   - LDL goal < 70   - defer to primary team   - outpatient fasting lipid profile   - on atorvastatin 40mg qhs     Metabolic Acidosis   defer to primary team   -resolved    d/w Primary team provider Kev Phipps, [facility]-BC  Nurse Practitioner  Division of Endocrinology & Diabetes  pager [phone]     If before 9AM or after 6PM, or on weekends/holidays, please call endocrine answering service for assistance ([phone]).For nonurgent matters email [email or web address] for assistance.    64 yo man with history of HTN, HLD, type 2 DM (on insulin), hyperthyroidism, asthma, GERD, and RAMANA (not on CPAP) presents with 2 weeks of painless jaundice, found to have  a 3.5 x 4.5 x 3.0 cm heterogenous solid mass in the pancreatic head/uncinate process with extrahepatic and intrahepatic biliary dilatation. Endocrinology consulted for assistance with management of uncontrolled DM2 w/ hyperglycemia and hypoglycemia iso newly discovered pancreatic mass.   Patient is high risk with high level decision making due to uncontrolled diabetes with A1C>9, new pancreatic mass and SOFIE which places patient at high risk for cardiovascular and cerebrovascular events. Patient with lability of glucose requiring close monitoring and insulin adjustments.    #Uncontrolled DM2 w/ hypo& hyperglycemia vs type 3 C DM  A1c 9%   newly found pancreatic mass   egfr 68 - SOFIE  higher basal/bolus insulin requirements at home but likely that po intake/diet is also much different   Recommendations:   - BG goal 100-180:   - 5/31 pt has not been eating much here, FS at goal on low dose lantus and no premeal insulin.  - Continue lantus 5 units at bedtime.   - Continue to hold standing premeal insulin for now (pt is eating in between meals at times)  - Continue Admelog Low Correction Scale Premeal & Separate LOW Correction Scale Bedtime - change to q6h if npo   - FS Blood glucose monitoring Premeal/Bedtime - change to q6h if npo  - Hypoglycemia protocol   - Carb Consistent Diet   - Nutrition consult   - c-peptide 2.8 with glucose of 207- pt making his own insulin    DC Plan: Since pt PO intake has decreased and insulin requirements have as well while inpt will discharge pt on lantus 5 units at bedtime for now. Instructed pt that if FS consistently >180 in the morning to contact his PCP because his lantus dose will need to be increased and will need to add back premeal admelog as well. Please ensure patient has prescriptions for diabetes supplies (glucometer, test strips, lancets, alcohol swabs, insulin pen needles).   **Patient interested in CGM- Primary team send hung 3 sensors and needs to send hung 3 reader, pt phone is not compatible with hung 3 abdulkadir. Offered to place hung 3 sensor for now and pt declined and said he would place it himself at home, left sample at bedside for the pt.   Routine outpatient opthalmology & podiatry evaluations recommended. Patient can follow up with endocrinology at the location provided below:   Follow up appointment made:  7/8 at 2pm with PA and 12/03 at 2pm with Dr. Uribe  [facility]ocrinology [facility]   [address]  [place] [address]  [phone]    #Hx of Hyperthyroidism  #Hx of Thyroid Nodule   - outpatient endocrine follow up  - not on Methimazole   - on atenolol   - TSH 5/17 wnl at 0.7  -HR has been bradycardic at times and pt not getting his atenolol.  -Discharge: STOP atenolol and follow up with PCP/endocrinologist to monitor TFT's and HR and repeat them in 4-6 weeks.     #HTN  Recommendations:   - outpt BP goal < 130/80   - defer to primary team   - outpatient annual urinary microalb/cr ratio  - on atenolol   -for discharge recommending to dc atenolol since HR has been bradycardic at times and TSH normal. BP has also been stable.     #HLD  Recommendations:   - LDL goal < 70   - defer to primary team   - outpatient fasting lipid profile   - on atorvastatin 40mg qhs     Metabolic Acidosis   defer to primary team   -resolved    d/w Primary team provider PEYTON Abel-BC  Nurse Practitioner  Division of Endocrinology & Diabetes  pager [phone]     If before 9AM or after 6PM, or on weekends/holidays, please call endocrine answering service for assistance ([phone]).For nonurgent matters email [email or web address] for assistance.

## 2024-05-31 NOTE — PROGRESS NOTE ADULT - SUBJECTIVE AND OBJECTIVE BOX
Chief Complaint: type 2 dm    History: Fair PO intake, pt is not eating much here. FS at goal on low dose lantus, no premeal insulin.     MEDICATIONS  (STANDING):  atenolol  Tablet 50 milliGRAM(s) Oral daily  cholestyramine Powder (Sugar-Free) 4 Gram(s) Oral daily  dextrose 50% Injectable 25 Gram(s) IV Push once  dextrose 50% Injectable 12.5 Gram(s) IV Push once  famotidine    Tablet 40 milliGRAM(s) Oral daily  heparin   Injectable 5000 Unit(s) SubCutaneous every 8 hours  insulin glargine Injectable (LANTUS) 5 Unit(s) SubCutaneous at bedtime  insulin lispro (ADMELOG) corrective regimen sliding scale   SubCutaneous at bedtime  insulin lispro (ADMELOG) corrective regimen sliding scale   SubCutaneous three times a day before meals  lidocaine   4% Patch 1 Patch Transdermal daily  polyethylene glycol 3350 17 Gram(s) Oral daily  senna 2 Tablet(s) Oral at bedtime  ursodiol Tablet 250 milliGRAM(s) Oral two times a day    MEDICATIONS  (PRN):  acetaminophen     Tablet .. 650 milliGRAM(s) Oral every 6 hours PRN Temp greater or equal to 38C (100.4F), Mild Pain (1 - 3), Moderate Pain (4 - 6), Severe Pain (7 - 10)  albuterol/ipratropium for Nebulization 3 milliLiter(s) Nebulizer every 6 hours PRN Shortness of Breath and/or Wheezing  calamine/zinc oxide Lotion 1 Application(s) Topical three times a day PRN Itching  dextrose Oral Gel 15 Gram(s) Oral once PRN Blood Glucose LESS THAN 70 milliGRAM(s)/deciliter  hydrOXYzine hydrochloride 25 milliGRAM(s) Oral every 8 hours PRN Itching  petrolatum white Ointment 1 Application(s) Topical every 6 hours PRN Dry lips      Allergies    No Known Allergies    Intolerances      Review of Systems:  Cardiovascular: No chest pain, palpitations  Respiratory: No SOB, no cough  GI: No nausea, vomiting, abdominal pain  : No dysuria  Endocrine: no polyuria, polydipsia    PHYSICAL EXAM:  VITALS: T(C): 36.4 (05-31-24 @ 07:56)  T(F): 97.6 (05-31-24 @ 07:56), Max: 98.3 (05-30-24 @ 20:54)  HR: 55 (05-31-24 @ 07:56) (51 - 60)  BP: 115/65 (05-31-24 @ 07:56) (95/50 - 115/65)  RR:  (18 - 18)  SpO2:  (95% - 100%)  Wt(kg): --  GENERAL: NAD, well-groomed, well-developed  EYES: No proptosis  HEENT:  Atraumatic, Normocephalic  RESPIRATORY: non labored breathing   CARDIOVASCULAR: Regular rate and rhythm  GI: Soft, nontender, non distended  PSYCH: Alert and oriented x 3, normal affect, normal mood     CAPILLARY BLOOD GLUCOSE      POCT Blood Glucose.: 127 mg/dL (31 May 2024 11:42)  POCT Blood Glucose.: 108 mg/dL (31 May 2024 07:37)  POCT Blood Glucose.: 135 mg/dL (30 May 2024 21:17)  POCT Blood Glucose.: 110 mg/dL (30 May 2024 16:44)      05-31    139  |  108<H>  |  31<H>  ----------------------------<  107<H>  3.8   |  16<L>  |  1.38<H>    eGFR: 57<L>    Ca    9.0      05-31  Mg     1.90     05-31  Phos  3.7     05-31    TPro  5.5<L>  /  Alb  2.3<L>  /  TBili  22.2<H>  /  DBili  x   /  AST  84<H>  /  ALT  40  /  AlkPhos  461<H>  05-31          Thyroid Function Tests:  05-17 @ 16:22 TSH 0.70 FreeT4 -- T3 -- Anti TPO -- Anti Thyroglobulin Ab -- TSI --        A1C with Estimated Average Glucose Result: 9.0 % (05-18-24 @ 06:35)          Diet, Regular:   Consistent Carbohydrate Evening Snack (CSTCHOSN) (05-30-24 @ 07:45)

## 2024-05-31 NOTE — PROGRESS NOTE ADULT - PROBLEM SELECTOR PROBLEM 4
H/O thyroid nodule
Leukocytosis
H/O thyroid nodule
Leukocytosis
H/O thyroid nodule
Leukocytosis
H/O thyroid nodule
Leukocytosis

## 2024-06-04 ENCOUNTER — NON-APPOINTMENT (OUTPATIENT)
Age: 64
End: 2024-06-04

## 2024-06-05 ENCOUNTER — NON-APPOINTMENT (OUTPATIENT)
Age: 64
End: 2024-06-05

## 2024-06-05 NOTE — HISTORY OF PRESENT ILLNESS
[Abdominal Pain] : abdominal pain [Jaundice] : jaundice [Weight loss] : weight loss [Pruritus] : pruritus [ERCP] : ERCP [Endostent] : endostent [EUS] : EUS [Biopsy] : biopsy performed [Before Surgery] : before surgery [Not staged outside] : not staged outside [Nutrition] : Nutrition [Social Work] : Social Work [Ambulatory and capable of all self care but unable to carry out any work activities] : Status 2 - Ambulatory and capable of all self care but unable to carry out any work activities. Up and about more than 50% of waking hours [de-identified] : This is a non-billable note*   Mr. ADA COX is a 63 year old male who presents for evaluation in our [facility]. His PMH inlcudes  HTN, HLD, type 2 DM (on insulin), hyperthyroidism, asthma, GERD, and RAMANA (not on CPAP) . PSH of open cholecystectomy (15 yrs ago), appendectomy in .       Presented on 24 with 2 weeks of painless jaundice / fatigue, worsening glycemic control, weight loss (25 lb), and pruritus.  Imaging showing a pancreas head/uncinate mass.   CT C/A/P 24: 3.5 x 4.5 x 3.0 cm heterogenous solid mass in the pancreatic head/uncinate process with upstream main pancreatic ductal dilatation. The mass abuts the superior mesenteric vein and second portion of the duodenum. Mass also contacts the proximal SMA along the left than 180 degrees of its circumference.   No pulmonary embolism.   MRCP 24:  Pancreatic head neoplasm. Severe intrahepatic and extrahepatic biliary ductal dilatation. Common bile duct tapers in the region of the pancreatic mass and contains layering debris. Distal common bile duct enhancement, possibly postprocedural.    EUS/ ERCP  24:  Biliary stricture s/p sphincterotomy, brushing, and stent placement (10 mm x 60 mm FC-SEMS). Endosonographic finding notable for a mass in the uncinate process of the pancreas s/p FNB x3.  Pathology:  Invasive moderately-differentiated adenocarcinoma  Had persistent elevated LFTs  inpatient.  Repeat MRCP & abd xray on 24 without evidence of stent, possibly dislodged. S/p repeat ERCP and metal stent placement on 24  ECO   Has generalized weakness.  Prior to diagnosis ECOG 0.   Family ca hx: mother, sister- breast ca,  unsure if genetic testing    Labs:  24  Ca 19-9: 2142    CEA:  14.5   Tbili 27.8   24  AST: 84   ALT: 40  ALP: 461   Tbili: 22.2   [TextBox_4] : 05/17/2024 [TextBox_39] : 80- [id number]  [TextBox_41] : adenocarcinoma [TextBox_43] : 05/21/2024 [Pancreatic ductal adenocarcinoma] : Pancreatic ductal adenocarcinoma [Locally advanced pancreas cancer (LAPC 1)] : Locally advanced pancreas cancer (LAPC 1) [Less than 180 (abutment)] : less than 180 (abutment) [More than 180 (encasement)] : more than 180 (encasement) [head] : head [Chemotherapy] : chemotherapy [Curative (aimed at resection)] : curative (aimed at resection) [TextBox_5] : 06/04/2024 [TextBox_38] : 9912 [FreeTextEntry3] : 14.5 [FreeTextEntry4] : 22.2 [TextBox_40] : 05/17/2024 [TextBox_56] : 45 [FreeTextEntry6] : Neoadjuvant chemo Review 2 months into therapy

## 2024-06-06 ENCOUNTER — INPATIENT (INPATIENT)
Facility: HOSPITAL | Age: 64
LOS: 3 days | Discharge: HOME | End: 2024-06-10
Attending: SURGERY | Admitting: SURGERY
Payer: MEDICAID

## 2024-06-06 ENCOUNTER — APPOINTMENT (OUTPATIENT)
Dept: MULTI SPECIALTY CLINIC | Facility: CLINIC | Age: 64
End: 2024-06-06
Payer: MEDICAID

## 2024-06-06 ENCOUNTER — APPOINTMENT (OUTPATIENT)
Dept: MULTI SPECIALTY CLINIC | Facility: CLINIC | Age: 64
End: 2024-06-06

## 2024-06-06 VITALS
TEMPERATURE: 99 F | DIASTOLIC BLOOD PRESSURE: 74 MMHG | HEART RATE: 103 BPM | OXYGEN SATURATION: 98 % | SYSTOLIC BLOOD PRESSURE: 112 MMHG | RESPIRATION RATE: 18 BRPM | WEIGHT: 211.64 LBS | HEIGHT: 70 IN

## 2024-06-06 VITALS
WEIGHT: 215 LBS | BODY MASS INDEX: 30.78 KG/M2 | SYSTOLIC BLOOD PRESSURE: 120 MMHG | DIASTOLIC BLOOD PRESSURE: 70 MMHG | HEART RATE: 102 BPM | OXYGEN SATURATION: 93 % | HEIGHT: 70 IN

## 2024-06-06 DIAGNOSIS — R41.82 ALTERED MENTAL STATUS, UNSPECIFIED: ICD-10-CM

## 2024-06-06 DIAGNOSIS — Z90.49 ACQUIRED ABSENCE OF OTHER SPECIFIED PARTS OF DIGESTIVE TRACT: Chronic | ICD-10-CM

## 2024-06-06 DIAGNOSIS — C25.0 MALIGNANT NEOPLASM OF HEAD OF PANCREAS: ICD-10-CM

## 2024-06-06 LAB
ALBUMIN SERPL ELPH-MCNC: 2.3 G/DL — LOW (ref 3.3–5)
ALP SERPL-CCNC: 410 U/L — HIGH (ref 40–120)
ALT FLD-CCNC: 42 U/L — HIGH (ref 4–41)
AMMONIA BLD-MCNC: 29 UMOL/L — SIGNIFICANT CHANGE UP (ref 11–55)
ANION GAP SERPL CALC-SCNC: 16 MMOL/L — HIGH (ref 7–14)
APPEARANCE UR: ABNORMAL
APTT BLD: 38.4 SEC — HIGH (ref 24.5–35.6)
AST SERPL-CCNC: 117 U/L — HIGH (ref 4–40)
BACTERIA # UR AUTO: NEGATIVE /HPF — SIGNIFICANT CHANGE UP
BASE EXCESS BLDV CALC-SCNC: -6.2 MMOL/L — LOW (ref -2–3)
BASOPHILS # BLD AUTO: 0.02 K/UL — SIGNIFICANT CHANGE UP (ref 0–0.2)
BASOPHILS NFR BLD AUTO: 0.2 % — SIGNIFICANT CHANGE UP (ref 0–2)
BILIRUB SERPL-MCNC: 17.6 MG/DL — HIGH (ref 0.2–1.2)
BILIRUB UR-MCNC: ABNORMAL
BLD GP AB SCN SERPL QL: NEGATIVE — SIGNIFICANT CHANGE UP
BLOOD GAS VENOUS COMPREHENSIVE RESULT: SIGNIFICANT CHANGE UP
BUN SERPL-MCNC: 21 MG/DL — SIGNIFICANT CHANGE UP (ref 7–23)
CALCIUM SERPL-MCNC: 8.4 MG/DL — SIGNIFICANT CHANGE UP (ref 8.4–10.5)
CAST: 3 /LPF — SIGNIFICANT CHANGE UP (ref 0–4)
CHLORIDE BLDV-SCNC: 102 MMOL/L — SIGNIFICANT CHANGE UP (ref 96–108)
CHLORIDE SERPL-SCNC: 98 MMOL/L — SIGNIFICANT CHANGE UP (ref 98–107)
CO2 BLDV-SCNC: 20 MMOL/L — LOW (ref 22–26)
CO2 SERPL-SCNC: 16 MMOL/L — LOW (ref 22–31)
COLOR SPEC: SIGNIFICANT CHANGE UP
CREAT SERPL-MCNC: 1.46 MG/DL — HIGH (ref 0.5–1.3)
DIFF PNL FLD: NEGATIVE — SIGNIFICANT CHANGE UP
EGFR: 53 ML/MIN/1.73M2 — LOW
EOSINOPHIL # BLD AUTO: 0.48 K/UL — SIGNIFICANT CHANGE UP (ref 0–0.5)
EOSINOPHIL NFR BLD AUTO: 4.8 % — SIGNIFICANT CHANGE UP (ref 0–6)
FLUAV AG NPH QL: SIGNIFICANT CHANGE UP
FLUBV AG NPH QL: SIGNIFICANT CHANGE UP
GAS PNL BLDV: 129 MMOL/L — LOW (ref 136–145)
GAS PNL BLDV: SIGNIFICANT CHANGE UP
GLUCOSE BLDC GLUCOMTR-MCNC: 137 MG/DL — HIGH (ref 70–99)
GLUCOSE BLDC GLUCOMTR-MCNC: 177 MG/DL — HIGH (ref 70–99)
GLUCOSE BLDV-MCNC: 129 MG/DL — HIGH (ref 70–99)
GLUCOSE SERPL-MCNC: 131 MG/DL — HIGH (ref 70–99)
GLUCOSE UR QL: NEGATIVE MG/DL — SIGNIFICANT CHANGE UP
HCO3 BLDV-SCNC: 19 MMOL/L — LOW (ref 22–29)
HCT VFR BLD CALC: 29.6 % — LOW (ref 39–50)
HCT VFR BLDA CALC: 31 % — LOW (ref 39–51)
HGB BLD CALC-MCNC: 10.4 G/DL — LOW (ref 12.6–17.4)
HGB BLD-MCNC: 10.2 G/DL — LOW (ref 13–17)
IANC: 8.19 K/UL — HIGH (ref 1.8–7.4)
IMM GRANULOCYTES NFR BLD AUTO: 1 % — HIGH (ref 0–0.9)
INR BLD: 1.89 RATIO — HIGH (ref 0.85–1.18)
KETONES UR-MCNC: NEGATIVE MG/DL — SIGNIFICANT CHANGE UP
LACTATE BLDV-MCNC: 2.3 MMOL/L — HIGH (ref 0.5–2)
LACTATE SERPL-SCNC: 1.5 MMOL/L — SIGNIFICANT CHANGE UP (ref 0.5–2)
LEUKOCYTE ESTERASE UR-ACNC: ABNORMAL
LIDOCAIN IGE QN: 39 U/L — SIGNIFICANT CHANGE UP (ref 7–60)
LYMPHOCYTES # BLD AUTO: 0.77 K/UL — LOW (ref 1–3.3)
LYMPHOCYTES # BLD AUTO: 7.7 % — LOW (ref 13–44)
MCHC RBC-ENTMCNC: 32.1 PG — SIGNIFICANT CHANGE UP (ref 27–34)
MCHC RBC-ENTMCNC: 34.5 GM/DL — SIGNIFICANT CHANGE UP (ref 32–36)
MCV RBC AUTO: 93.1 FL — SIGNIFICANT CHANGE UP (ref 80–100)
MONOCYTES # BLD AUTO: 0.5 K/UL — SIGNIFICANT CHANGE UP (ref 0–0.9)
MONOCYTES NFR BLD AUTO: 5 % — SIGNIFICANT CHANGE UP (ref 2–14)
NEUTROPHILS # BLD AUTO: 8.19 K/UL — HIGH (ref 1.8–7.4)
NEUTROPHILS NFR BLD AUTO: 81.3 % — HIGH (ref 43–77)
NITRITE UR-MCNC: NEGATIVE — SIGNIFICANT CHANGE UP
NRBC # BLD: 0 /100 WBCS — SIGNIFICANT CHANGE UP (ref 0–0)
NRBC # FLD: 0 K/UL — SIGNIFICANT CHANGE UP (ref 0–0)
PCO2 BLDV: 35 MMHG — LOW (ref 42–55)
PH BLDV: 7.34 — SIGNIFICANT CHANGE UP (ref 7.32–7.43)
PH UR: 6 — SIGNIFICANT CHANGE UP (ref 5–8)
PLATELET # BLD AUTO: 213 K/UL — SIGNIFICANT CHANGE UP (ref 150–400)
PO2 BLDV: 31 MMHG — SIGNIFICANT CHANGE UP (ref 25–45)
POTASSIUM BLDV-SCNC: 5 MMOL/L — SIGNIFICANT CHANGE UP (ref 3.5–5.1)
POTASSIUM SERPL-MCNC: 4.6 MMOL/L — SIGNIFICANT CHANGE UP (ref 3.5–5.3)
POTASSIUM SERPL-SCNC: 4.6 MMOL/L — SIGNIFICANT CHANGE UP (ref 3.5–5.3)
PROT SERPL-MCNC: 6.6 G/DL — SIGNIFICANT CHANGE UP (ref 6–8.3)
PROT UR-MCNC: 30 MG/DL
PROTHROM AB SERPL-ACNC: 21 SEC — HIGH (ref 9.5–13)
RBC # BLD: 3.18 M/UL — LOW (ref 4.2–5.8)
RBC # FLD: 24 % — HIGH (ref 10.3–14.5)
RBC CASTS # UR COMP ASSIST: 7 /HPF — HIGH (ref 0–4)
REVIEW: SIGNIFICANT CHANGE UP
RH IG SCN BLD-IMP: POSITIVE — SIGNIFICANT CHANGE UP
RSV RNA NPH QL NAA+NON-PROBE: SIGNIFICANT CHANGE UP
SAO2 % BLDV: 48.1 % — LOW (ref 67–88)
SARS-COV-2 RNA SPEC QL NAA+PROBE: SIGNIFICANT CHANGE UP
SODIUM SERPL-SCNC: 130 MMOL/L — LOW (ref 135–145)
SP GR SPEC: 1.04 — HIGH (ref 1–1.03)
SQUAMOUS # UR AUTO: 3 /HPF — SIGNIFICANT CHANGE UP (ref 0–5)
UROBILINOGEN FLD QL: 2 MG/DL (ref 0.2–1)
WBC # BLD: 10.06 K/UL — SIGNIFICANT CHANGE UP (ref 3.8–10.5)
WBC # FLD AUTO: 10.06 K/UL — SIGNIFICANT CHANGE UP (ref 3.8–10.5)
WBC UR QL: 10 /HPF — HIGH (ref 0–5)

## 2024-06-06 PROCEDURE — 71045 X-RAY EXAM CHEST 1 VIEW: CPT | Mod: 26

## 2024-06-06 PROCEDURE — 74177 CT ABD & PELVIS W/CONTRAST: CPT | Mod: 26,MC

## 2024-06-06 PROCEDURE — 99285 EMERGENCY DEPT VISIT HI MDM: CPT

## 2024-06-06 PROCEDURE — 99213 OFFICE O/P EST LOW 20 MIN: CPT

## 2024-06-06 RX ORDER — PIPERACILLIN AND TAZOBACTAM 4; .5 G/20ML; G/20ML
3.38 INJECTION, POWDER, LYOPHILIZED, FOR SOLUTION INTRAVENOUS ONCE
Refills: 0 | Status: DISCONTINUED | OUTPATIENT
Start: 2024-06-06 | End: 2024-06-06

## 2024-06-06 RX ORDER — PIPERACILLIN AND TAZOBACTAM 4; .5 G/20ML; G/20ML
3.38 INJECTION, POWDER, LYOPHILIZED, FOR SOLUTION INTRAVENOUS EVERY 8 HOURS
Refills: 0 | Status: DISCONTINUED | OUTPATIENT
Start: 2024-06-07 | End: 2024-06-10

## 2024-06-06 RX ORDER — PIPERACILLIN AND TAZOBACTAM 4; .5 G/20ML; G/20ML
3.38 INJECTION, POWDER, LYOPHILIZED, FOR SOLUTION INTRAVENOUS ONCE
Refills: 0 | Status: COMPLETED | OUTPATIENT
Start: 2024-06-06 | End: 2024-06-06

## 2024-06-06 RX ORDER — ALBUTEROL 90 UG/1
3 AEROSOL, METERED ORAL
Refills: 0 | DISCHARGE

## 2024-06-06 RX ORDER — ACETAMINOPHEN 500 MG
1000 TABLET ORAL ONCE
Refills: 0 | Status: COMPLETED | OUTPATIENT
Start: 2024-06-06 | End: 2024-06-06

## 2024-06-06 RX ORDER — PIPERACILLIN AND TAZOBACTAM 4; .5 G/20ML; G/20ML
3.38 INJECTION, POWDER, LYOPHILIZED, FOR SOLUTION INTRAVENOUS ONCE
Refills: 0 | Status: COMPLETED | OUTPATIENT
Start: 2024-06-06 | End: 2024-06-07

## 2024-06-06 RX ORDER — VANCOMYCIN HCL 1 G
1000 VIAL (EA) INTRAVENOUS ONCE
Refills: 0 | Status: COMPLETED | OUTPATIENT
Start: 2024-06-06 | End: 2024-06-06

## 2024-06-06 RX ORDER — ENOXAPARIN SODIUM 100 MG/ML
40 INJECTION SUBCUTANEOUS ONCE
Refills: 0 | Status: COMPLETED | OUTPATIENT
Start: 2024-06-06 | End: 2024-06-06

## 2024-06-06 RX ORDER — ROSUVASTATIN CALCIUM 5 MG/1
1 TABLET ORAL
Refills: 0 | DISCHARGE

## 2024-06-06 RX ORDER — FAMOTIDINE 10 MG/ML
20 INJECTION INTRAVENOUS DAILY
Refills: 0 | Status: DISCONTINUED | OUTPATIENT
Start: 2024-06-06 | End: 2024-06-10

## 2024-06-06 RX ORDER — HYDROXYZINE HCL 10 MG
25 TABLET ORAL EVERY 8 HOURS
Refills: 0 | Status: DISCONTINUED | OUTPATIENT
Start: 2024-06-06 | End: 2024-06-10

## 2024-06-06 RX ORDER — SODIUM CHLORIDE 9 MG/ML
1000 INJECTION, SOLUTION INTRAVENOUS
Refills: 0 | Status: DISCONTINUED | OUTPATIENT
Start: 2024-06-06 | End: 2024-06-07

## 2024-06-06 RX ORDER — SODIUM CHLORIDE 9 MG/ML
1000 INJECTION, SOLUTION INTRAVENOUS ONCE
Refills: 0 | Status: COMPLETED | OUTPATIENT
Start: 2024-06-06 | End: 2024-06-06

## 2024-06-06 RX ADMIN — PIPERACILLIN AND TAZOBACTAM 200 GRAM(S): 4; .5 INJECTION, POWDER, LYOPHILIZED, FOR SOLUTION INTRAVENOUS at 18:57

## 2024-06-06 RX ADMIN — Medication 250 MILLIGRAM(S): at 21:22

## 2024-06-06 RX ADMIN — Medication 400 MILLIGRAM(S): at 19:37

## 2024-06-06 RX ADMIN — ENOXAPARIN SODIUM 40 MILLIGRAM(S): 100 INJECTION SUBCUTANEOUS at 19:38

## 2024-06-06 RX ADMIN — SODIUM CHLORIDE 1000 MILLILITER(S): 9 INJECTION, SOLUTION INTRAVENOUS at 21:23

## 2024-06-06 RX ADMIN — Medication 25 MILLIGRAM(S): at 23:57

## 2024-06-06 RX ADMIN — SODIUM CHLORIDE 120 MILLILITER(S): 9 INJECTION, SOLUTION INTRAVENOUS at 21:09

## 2024-06-06 NOTE — ED ADULT NURSE NOTE - OBJECTIVE STATEMENT
Pt received to room 22. Pt is a 63 year old male with Hx of DM2, HTN, pancreatic cancer, s/p stent. Pt presents to ED c/o weakness, nausea, fevers/chills. Pt was recently discharged on 5/31 from [facility] with stent placement. denies chest pain, SOB, headache, dizziness, urinary symptoms, numbness/tingling.   Pt is A&Ox3, ambulatory with assistance. neuro/sensory intact. breathing is even and unlabored. abdomen is soft, nontender, nondistended. no edema noted. +skin and eyes noted to be jaundice. spontaneous movement of all extremities. Placed on cardiac monitor and continuous pulse oximetry. EKG in chart. VS as noted. stretcher set in lowest position, call bell within reach, safety maintained.

## 2024-06-06 NOTE — ED ADULT NURSE REASSESSMENT NOTE - NS ED NURSE REASSESS COMMENT FT1
Surgery MD Neal paged regarding pt hypotension. As per MD Neal pt okay to be transported to floor with MAP above 65. Pt medicated per EMAR. Pt A&Ox3 mentating well. Denies headache, dizziness, chest pain, SOB, and blurry vision. Plan of care ongoing, comfort measures provided and safety measures maintained. Awaiting transport to floor.

## 2024-06-06 NOTE — ED PROVIDER NOTE - PROGRESS NOTE DETAILS
Receive signout. patient TBA to surgery. Patient obtained her CT scan, ammonia redrawn.   -Duncan Molina PGY-2

## 2024-06-06 NOTE — ED ADULT TRIAGE NOTE - CHIEF COMPLAINT QUOTE
pt living with DM type2, pancreatic ca, recent biliary duct stent placement, c/o of lethargy and weakness for 5 days, c/o of occasional vomiting as well

## 2024-06-06 NOTE — ED ADULT NURSE REASSESSMENT NOTE - NS ED NURSE REASSESS COMMENT FT1
Upon reassessment pt respirations even and unlabored on room air. No acute distress noted. Pt remains on continuous cardiac monitor, NSR noted. VS as noted in flowsheet. FS performed at this time. Denies headache, dizziness, chest pain and SOB. Plan of care ongoing, comfort measures provided and safety measures maintained. Pt transported via stretcher to bed assignment. UA/UC sent per MD orders.

## 2024-06-06 NOTE — H&P ADULT - HISTORY OF PRESENT ILLNESS
63 year old male w/ a PMH includes HTN, HLD, type 2 DM (on insulin), hyperthyroidism, asthma, GERD, and RAMANA (not on CPAP). PSH of open cholecystectomy (15 yrs ago), appendectomy in (1975) and known Pancreatic head neoplasm (s/p repeat ERCP and metal stent placement on 5/29/24) presented to surg oncology clinic (Dr. Jose Angel Guevara) today w/ chills and weakness since discharge last Friday (5/31).  63 year old male w/ a PMH includes HTN, HLD, type 2 DM (on insulin), hyperthyroidism, asthma, GERD, and RAMANA (not on CPAP). PSH of open cholecystectomy (15 yrs ago), appendectomy in () and pancreatic cancer (s/p repeat ERCP and metal stent placement on 24) presented to surg oncology clinic (Dr. Jose Angel Guevara) today w/ chills and weakness since discharge last Friday (). Patient was visibly shivering in the office so Dr. Guevara sent the patient in to the ED for a cholangitis/pancreatitis w/u.    Oncology Hx:  EUS/ ERCP 24: Biliary stricture s/p sphincterotomy, brushing, and stent placement (10 mm x 60 mm FC-SEMS). Endosonographic finding notable for a mass in the uncinate process of the pancreas s/p FNB x3.   Pathology: Invasive moderately-differentiated adenocarcinoma  Had persistent elevated LFTs inpatient.   Repeat MRCP & abd xray on 24 without evidence of stent, possibly dislodged.  S/p repeat ERCP and metal stent placement on 24  ECO Has generalized weakness. Prior to diagnosis ECOG 0.   Family ca hx: mother, sister- breast ca, unsure if genetic testing   Labs:   24 Ca 19-9: 2142 CEA: 14.5 Tbili 27.8   24 AST: 84 ALT: 40 ALP: 461 Tbili: 22.2.  Clinical Stage of disease (PMDC): Locally advanced pancreas cancer (LAPC 1)  Degree of involvement of SMA: less than 180 (abutment)   Degree of involvement of PV-SMV: more than 180 (encasement)   Tumor size(mm): 45   Tumor site: head   PMDC Recommendations: chemotherapy   Intention to treat: curative (aimed at resection)   [facility] [place]: Neoadjuvant chemo  Review 2 months into therapy   Supportive Care Referrals: Nutrition, Social Work   ECOG Performance Status: Status 2 - Ambulatory and capable of all self care but unable to carry out any work activities. Up and about more than 50% of waking hours.

## 2024-06-06 NOTE — ED PROVIDER NOTE - OBJECTIVE STATEMENT
63-year-old male with history of HTN, HLD, diabetes mellitus, hypothyroidism, pancreatic cancer status post ERCP and stent 5/22 presents to the ED with weakness and fever.  Pt also endorses nausea. Patient was recently discharged 1 week ago after his stent placement. No diarrhea, cough, rashes, dysuria, or hematuria.

## 2024-06-06 NOTE — ED PROVIDER NOTE - IV ALTEPLASE DOOR HIDDEN
Next Appt 6/30/23  Last Appt 6/27/22    Refill request for    Disp Refills Start End    metoPROLOL succinate (TOPROL-XL) 50 MG 24 hr tablet 90 tablet 1 8/18/2022     Sig - Route: Take 1 tablet by mouth daily. - Oral    Sent to pharmacy as: Metoprolol Succinate ER 50 MG Oral Tablet Extended Release 24 Hour (TOPROL-XL)        Refilled per standing med protocol.    
show

## 2024-06-06 NOTE — ED ADULT NURSE NOTE - NSFALLHARMRISKINTERV_ED_ALL_ED

## 2024-06-06 NOTE — ED ADULT NURSE REASSESSMENT NOTE - NS ED NURSE REASSESS COMMENT FT1
ultrasound guided 20G IV placed to right upper arm by MD Molina. ultrasound guided 20G IV placed to left upper arm by MD Molina.

## 2024-06-06 NOTE — ED PROVIDER NOTE - PHYSICAL EXAMINATION
Const: Awake, alert, no acute distress.  Well appearing.  Moving comfortably on stretcher.  HEENT: NC/AT.  Moist mucous membranes.  No pharyngeal erythema, no exudates.  + scleral icterus.  Neck: Neck supple, full ROM without pain.  Cardiac: Regular rate and regular rhythm. S1 S2 present.  Peripheral pulses 2+ and symmetric. No LE edema.  Resp: Speaking in full sentences. No evidence of respiratory distress.  Breath sounds clear to auscultation b/l. Normal chest excursion.   Abd: Non-distended, no overlying skin changes.  Soft, non-tender, no guarding, no rigidity, no rebound tenderness.  No palpable masses.  Normal bowel sounds in all 4 quadrants.  Skin: Normal coloration.  No rashes, abrasions or lacerations.  Neuro: Awake, alert & oriented x 3.  Moves all extremities spontaneously.  No focal deficits.

## 2024-06-06 NOTE — H&P ADULT - NSHPPHYSICALEXAM_GEN_ALL_CORE
Vital Signs Last 24 Hrs  T(C): 38.4 (06 Jun 2024 18:33), Max: 38.4 (06 Jun 2024 18:33)  T(F): 101.2 (06 Jun 2024 18:33), Max: 101.2 (06 Jun 2024 18:33)  HR: 98 (06 Jun 2024 18:33) (98 - 103)  BP: 104/73 (06 Jun 2024 18:33) (104/73 - 112/74)  BP(mean): --  RR: 20 (06 Jun 2024 18:33) (18 - 20)  SpO2: 96% (06 Jun 2024 18:33) (96% - 98%)    Parameters below as of 06 Jun 2024 18:33  Patient On (Oxygen Delivery Method): room air    PHYSICAL EXAM:  GENERAL: NAD, well-groomed, well-developed, jaundiced  HEAD:  Atraumatic, Normocephalic  EYES: EOMI, PERRLA, icteric sclera  NERVOUS SYSTEM: AOX3, motor and sensation grossly intact in b/l UE and b/l LE  CHEST/LUNG: Clear to auscultation bilaterally; No rales, rhonchi, wheezing, or rubs  HEART: Regular rate and rhythm; No murmurs, rubs, or gallops. No LE edema  ABDOMEN: Soft, Nondistended, moderate RUQ tenderness to deep palpation  EXTREMITIES:  2+ Peripheral Pulses, No clubbing, cyanosis

## 2024-06-06 NOTE — H&P ADULT - NSHPLABSRESULTS_GEN_ALL_CORE
CBC (06-06 @ 18:35)                              10.2<L>                         10.06   )----------------(  213        81.3<H>% Neutrophils, 7.7<L>% Lymphocytes, ANC: 8.19<H>                              29.6<L>                      ABG (06-06 @ 18:35)      /  /  /  /  / %     Lactate:   2.3<H>    VBG (06-06 @ 18:35)     7.34 / 35<L> / 31 / 19<L> / -6.2<L> / 48.1<L>%

## 2024-06-06 NOTE — H&P ADULT - ASSESSMENT
63 year old male w/ a PMH includes HTN, HLD, type 2 DM (on insulin), hyperthyroidism, asthma, GERD, and RAMANA (not on CPAP). PSH of open cholecystectomy (15 yrs ago), appendectomy in (1975) and known Pancreatic head neoplasm (s/p repeat ERCP and metal stent placement on 5/29/24) presented to surg oncology clinic (Dr. Jose Angel Guevara) today w/ chills and weakness since discharge last Friday (5/31).  63 year old male w/ a PMH includes HTN, HLD, type 2 DM (on insulin), hyperthyroidism, asthma, GERD, and RAMANA (not on CPAP). PSH of open cholecystectomy (15 yrs ago), appendectomy in (1975) and known Pancreatic head neoplasm (s/p repeat ERCP and metal stent placement on 5/29/24) presented to surg oncology clinic (Dr. Jose Angel Guevara) today w/ chills and weakness since discharge last Friday (5/31).     Recommendations:  - Admit to Dr. Guevara  - Sepsis w/u (CXR, UA, blood culturex2, full set of labs)  - NPO/IVF  - IV abx  - dvt ppx  - continue home antihistamines for pruritis    Discussed w/ Surg Onc attending, Dr. Guevara    D-Team, [id number] 63 year old male w/ a PMH includes HTN, HLD, type 2 DM (on insulin), hyperthyroidism, asthma, GERD, and RAMANA (not on CPAP). PSH of open cholecystectomy (15 yrs ago), appendectomy in (1975) and known Pancreatic head neoplasm (s/p repeat ERCP and metal stent placement on 5/29/24) presented to surg oncology clinic (Dr. Jose Angel Guevara) today w/ chills and weakness since discharge last Friday (5/31).     Recommendations:  - Admit to Dr. Guevara  - Sepsis w/u (CXR, UA, blood culturex2, full set of labs)  - NPO/IVF  - IV abx  - dvt ppx  - continue home antihistamines for pruritis    Discussed w/ Surg Onc attending, Dr. Guevara    E-Team, [id number]

## 2024-06-06 NOTE — ED PROVIDER NOTE - ATTENDING CONTRIBUTION TO CARE
I performed a history and physical exam of the patient and discussed their management with the resident/fellow/ACP/student. I have reviewed the resident/fellow/ACP/student note and agree with the documented findings and plan of care, except as noted. I have personally performed a substantive portion of the visit including all aspects of the medical decision making. My medical decision making and observations are found above. Please refer to any progress notes for updates on clinical course.    64 y/o male with pmhx of HTN, HLD, type 2 DM (on insulin), hyperthyroidism, asthma, GERD, and RAMANA (not on CPAP), recent admission and found to have pancreatic cancer requiring biliary stent presenting with weakness and vomiting. Patient reports mild nausea with 1 episode of nonbloody emesis, feeling weak with intermittent tactile fevers/chills with no associated diarrhea, cough, rashes, dysuria, or hematuria.  Family member at bedside also reports that patient has been more delusional than usual, acting different/weird, and saying odd things. Patient denies abdominal pain.    Gen: WDWN, NAD, comfortable appearing, febrile rectally, odd behavior   HEENT: No nasal discharge, mucous membranes moist   CV: RRR, +S1/S2, no M/R/G, equal b/l radial pulses 2+  Resp: CTAB, no W/R/R, SPO2 >95% on RA, no increased WOB   GI: Abdomen soft non-distended, NTTP, no masses/organomegaly   MSK/Skin: No CVA tenderness, no open wounds, no bruising, no LE edema  Neuro: A&Ox2, moving all 4 extremities spontaneously, gross sensation intact in UE and LE BL  Psych: Odd behavior/paranoid, no SI    MDM:  64 y/o male with pmhx of HTN, HLD, type 2 DM (on insulin), hyperthyroidism, asthma, GERD, and RAMANA (not on CPAP), recent admission and found to have pancreatic cancer requiring biliary stent presenting with weakness and vomiting, Altered/delusional behavior, febrile, hemodynamically stable, recent biliary stent.  Exam/history concerning for but not limited to biliary stent complication/abscess/cholangitis versus hepatic encephalopathy versus other intra-abdominal infectious/obstructive pathology.  Will evaluate with CT abdomen pelvis, basic labs, surgery team at bedside and will follow-up official recommendations, empiric antibiotics, and reassess

## 2024-06-06 NOTE — ED ADULT NURSE REASSESSMENT NOTE - NS ED NURSE REASSESS COMMENT FT1
Report received from SHAYAN Beckham. Pt A&Ox3 sitting up in stretcher resting comfortably. Respirations even and unlabored on room air. No acute distress noted. Pt remains on continuous cardiac monitor, NSR noted. VS as noted in flow sheet. Pt noted to be hypotensive. Denies headache, dizziness, chest pain and SOB. Comfort measures provided and safety measures maintained. Surgery paged. Awaiting further orders.

## 2024-06-07 ENCOUNTER — NON-APPOINTMENT (OUTPATIENT)
Age: 64
End: 2024-06-07

## 2024-06-07 PROBLEM — C25.0: Status: ACTIVE | Noted: 2024-06-07

## 2024-06-07 LAB
-  STAPHYLOCOCCUS EPIDERMIDIS, METHICILLIN RESISTANT: SIGNIFICANT CHANGE UP
ALBUMIN SERPL ELPH-MCNC: 1.9 G/DL — LOW (ref 3.3–5)
ALP SERPL-CCNC: 332 U/L — HIGH (ref 40–120)
ALT FLD-CCNC: 33 U/L — SIGNIFICANT CHANGE UP (ref 4–41)
ANION GAP SERPL CALC-SCNC: 12 MMOL/L — SIGNIFICANT CHANGE UP (ref 7–14)
AST SERPL-CCNC: 75 U/L — HIGH (ref 4–40)
BILIRUB SERPL-MCNC: 13.6 MG/DL — HIGH (ref 0.2–1.2)
BUN SERPL-MCNC: 18 MG/DL — SIGNIFICANT CHANGE UP (ref 7–23)
CALCIUM SERPL-MCNC: 7.6 MG/DL — LOW (ref 8.4–10.5)
CHLORIDE SERPL-SCNC: 103 MMOL/L — SIGNIFICANT CHANGE UP (ref 98–107)
CO2 SERPL-SCNC: 17 MMOL/L — LOW (ref 22–31)
CREAT SERPL-MCNC: 1.44 MG/DL — HIGH (ref 0.5–1.3)
EGFR: 54 ML/MIN/1.73M2 — LOW
GLUCOSE BLDC GLUCOMTR-MCNC: 114 MG/DL — HIGH (ref 70–99)
GLUCOSE BLDC GLUCOMTR-MCNC: 142 MG/DL — HIGH (ref 70–99)
GLUCOSE BLDC GLUCOMTR-MCNC: 149 MG/DL — HIGH (ref 70–99)
GLUCOSE BLDC GLUCOMTR-MCNC: 93 MG/DL — SIGNIFICANT CHANGE UP (ref 70–99)
GLUCOSE SERPL-MCNC: 151 MG/DL — HIGH (ref 70–99)
GRAM STN FLD: ABNORMAL
HCT VFR BLD CALC: 25.6 % — LOW (ref 39–50)
HGB BLD-MCNC: 8.7 G/DL — LOW (ref 13–17)
MAGNESIUM SERPL-MCNC: 1.8 MG/DL — SIGNIFICANT CHANGE UP (ref 1.6–2.6)
MCHC RBC-ENTMCNC: 32.6 PG — SIGNIFICANT CHANGE UP (ref 27–34)
MCHC RBC-ENTMCNC: 34 GM/DL — SIGNIFICANT CHANGE UP (ref 32–36)
MCV RBC AUTO: 95.9 FL — SIGNIFICANT CHANGE UP (ref 80–100)
METHOD TYPE: SIGNIFICANT CHANGE UP
NRBC # BLD: 0 /100 WBCS — SIGNIFICANT CHANGE UP (ref 0–0)
NRBC # FLD: 0 K/UL — SIGNIFICANT CHANGE UP (ref 0–0)
PHOSPHATE SERPL-MCNC: 3 MG/DL — SIGNIFICANT CHANGE UP (ref 2.5–4.5)
PLATELET # BLD AUTO: 163 K/UL — SIGNIFICANT CHANGE UP (ref 150–400)
POTASSIUM SERPL-MCNC: 3.7 MMOL/L — SIGNIFICANT CHANGE UP (ref 3.5–5.3)
POTASSIUM SERPL-SCNC: 3.7 MMOL/L — SIGNIFICANT CHANGE UP (ref 3.5–5.3)
PROT SERPL-MCNC: 5.7 G/DL — LOW (ref 6–8.3)
RBC # BLD: 2.67 M/UL — LOW (ref 4.2–5.8)
RBC # FLD: 24.2 % — HIGH (ref 10.3–14.5)
SODIUM SERPL-SCNC: 132 MMOL/L — LOW (ref 135–145)
SPECIMEN SOURCE: SIGNIFICANT CHANGE UP
WBC # BLD: 8.22 K/UL — SIGNIFICANT CHANGE UP (ref 3.8–10.5)
WBC # FLD AUTO: 8.22 K/UL — SIGNIFICANT CHANGE UP (ref 3.8–10.5)

## 2024-06-07 PROCEDURE — 99232 SBSQ HOSP IP/OBS MODERATE 35: CPT

## 2024-06-07 RX ORDER — DEXTROSE 50 % IN WATER 50 %
12.5 SYRINGE (ML) INTRAVENOUS ONCE
Refills: 0 | Status: DISCONTINUED | OUTPATIENT
Start: 2024-06-07 | End: 2024-06-10

## 2024-06-07 RX ORDER — GLUCAGON INJECTION, SOLUTION 0.5 MG/.1ML
1 INJECTION, SOLUTION SUBCUTANEOUS ONCE
Refills: 0 | Status: DISCONTINUED | OUTPATIENT
Start: 2024-06-07 | End: 2024-06-10

## 2024-06-07 RX ORDER — URSODIOL 250 MG/1
250 TABLET, FILM COATED ORAL
Refills: 0 | Status: DISCONTINUED | OUTPATIENT
Start: 2024-06-07 | End: 2024-06-10

## 2024-06-07 RX ORDER — SODIUM CHLORIDE 9 MG/ML
1000 INJECTION, SOLUTION INTRAVENOUS
Refills: 0 | Status: DISCONTINUED | OUTPATIENT
Start: 2024-06-07 | End: 2024-06-10

## 2024-06-07 RX ORDER — DEXTROSE 50 % IN WATER 50 %
15 SYRINGE (ML) INTRAVENOUS ONCE
Refills: 0 | Status: DISCONTINUED | OUTPATIENT
Start: 2024-06-07 | End: 2024-06-10

## 2024-06-07 RX ORDER — DEXTROSE 50 % IN WATER 50 %
25 SYRINGE (ML) INTRAVENOUS ONCE
Refills: 0 | Status: DISCONTINUED | OUTPATIENT
Start: 2024-06-07 | End: 2024-06-10

## 2024-06-07 RX ORDER — HEPARIN SODIUM 5000 [USP'U]/ML
5000 INJECTION INTRAVENOUS; SUBCUTANEOUS EVERY 8 HOURS
Refills: 0 | Status: DISCONTINUED | OUTPATIENT
Start: 2024-06-07 | End: 2024-06-10

## 2024-06-07 RX ORDER — INSULIN LISPRO 100/ML
VIAL (ML) SUBCUTANEOUS AT BEDTIME
Refills: 0 | Status: DISCONTINUED | OUTPATIENT
Start: 2024-06-07 | End: 2024-06-10

## 2024-06-07 RX ORDER — DEXTROSE 10 % IN WATER 10 %
125 INTRAVENOUS SOLUTION INTRAVENOUS ONCE
Refills: 0 | Status: DISCONTINUED | OUTPATIENT
Start: 2024-06-07 | End: 2024-06-10

## 2024-06-07 RX ORDER — INSULIN LISPRO 100/ML
VIAL (ML) SUBCUTANEOUS
Refills: 0 | Status: DISCONTINUED | OUTPATIENT
Start: 2024-06-07 | End: 2024-06-10

## 2024-06-07 RX ORDER — SODIUM CHLORIDE 9 MG/ML
1000 INJECTION INTRAMUSCULAR; INTRAVENOUS; SUBCUTANEOUS
Refills: 0 | Status: DISCONTINUED | OUTPATIENT
Start: 2024-06-07 | End: 2024-06-09

## 2024-06-07 RX ADMIN — Medication 25 MILLIGRAM(S): at 14:09

## 2024-06-07 RX ADMIN — HEPARIN SODIUM 5000 UNIT(S): 5000 INJECTION INTRAVENOUS; SUBCUTANEOUS at 14:09

## 2024-06-07 RX ADMIN — PIPERACILLIN AND TAZOBACTAM 25 GRAM(S): 4; .5 INJECTION, POWDER, LYOPHILIZED, FOR SOLUTION INTRAVENOUS at 08:08

## 2024-06-07 RX ADMIN — Medication 25 MILLIGRAM(S): at 06:10

## 2024-06-07 RX ADMIN — URSODIOL 250 MILLIGRAM(S): 250 TABLET, FILM COATED ORAL at 17:20

## 2024-06-07 RX ADMIN — HEPARIN SODIUM 5000 UNIT(S): 5000 INJECTION INTRAVENOUS; SUBCUTANEOUS at 22:43

## 2024-06-07 RX ADMIN — PIPERACILLIN AND TAZOBACTAM 25 GRAM(S): 4; .5 INJECTION, POWDER, LYOPHILIZED, FOR SOLUTION INTRAVENOUS at 22:41

## 2024-06-07 RX ADMIN — FAMOTIDINE 20 MILLIGRAM(S): 10 INJECTION INTRAVENOUS at 12:25

## 2024-06-07 RX ADMIN — SODIUM CHLORIDE 120 MILLILITER(S): 9 INJECTION INTRAMUSCULAR; INTRAVENOUS; SUBCUTANEOUS at 14:09

## 2024-06-07 RX ADMIN — Medication 25 MILLIGRAM(S): at 22:42

## 2024-06-07 RX ADMIN — URSODIOL 250 MILLIGRAM(S): 250 TABLET, FILM COATED ORAL at 02:47

## 2024-06-07 RX ADMIN — PIPERACILLIN AND TAZOBACTAM 25 GRAM(S): 4; .5 INJECTION, POWDER, LYOPHILIZED, FOR SOLUTION INTRAVENOUS at 00:02

## 2024-06-07 RX ADMIN — PIPERACILLIN AND TAZOBACTAM 25 GRAM(S): 4; .5 INJECTION, POWDER, LYOPHILIZED, FOR SOLUTION INTRAVENOUS at 17:20

## 2024-06-07 NOTE — DIETITIAN INITIAL EVALUATION ADULT - ADD RECOMMEND
1. Monitor weights, labs, BM's, skin integrity, PO intake   2. Please monitor % PO intake on flowsheets   3. Honor food preferences as able within therapeutic diet order.  4. Recommend patient follow-up with outpatient dietitian for continued nutrition counseling.

## 2024-06-07 NOTE — PATIENT PROFILE ADULT - FALL HARM RISK - UNIVERSAL INTERVENTIONS
Bed in lowest position, wheels locked, appropriate side rails in place/Call bell, personal items and telephone in reach/Instruct patient to call for assistance before getting out of bed or chair/Non-slip footwear when patient is out of bed/[place] to call system/Physically safe environment - no spills, clutter or unnecessary equipment/Purposeful Proactive Rounding/Room/bathroom lighting operational, light cord in reach

## 2024-06-07 NOTE — DIETITIAN INITIAL EVALUATION ADULT - ORAL INTAKE PTA/DIET HISTORY
Patient seen at bedside by writer. When asked about any food allergies or food intolerances, patient responded "No...why does everyone keep asking me these questions? Don't you have it in the chart already?" Remarked he does not consume pork. Writer attempted to explain purpose of nutrition assessment, however patient expressed frustrations with  at [facility], therapeutic recommendations for DM management, and other aspects of clinical care. Not amenable to interview at this time.    Patient with previous admission at [facility], per RD documentation 5/20: "Pt reported decreased appetite/PO intake secondary to lack of taste. Pt reported consuming a regular diet at baseline. NKFA  reported. Pt reported UBW 212lbs."    Per Oksana HICKEY, noted the following weight history: 96kg (6/6), 99.8kg (5/17), 113.4kg (10/6)  Objective weight measurements suggest 17.4kg (15%) weight loss x8 months period of time (significant)

## 2024-06-07 NOTE — HISTORY OF PRESENT ILLNESS
[de-identified] : Mr. ADA BROOKS is a 63 year old male who presents for evaluation in our Pancreas [facility]id[facility] Clinic. His PMH inlcudes HTN, HLD, type 2 DM (on insulin), hyperthyroidism, asthma, GERD, and RAMANA (not on CPAP). PSH of open cholecystectomy (15 yrs ago), appendectomy in .  Presented on 24 with 2 weeks of painless jaundice / fatigue, worsening glycemic control, weight loss (25 lb), and pruritus. Imaging showing a pancreas head/uncinate mass.  CT C/A/P 24: 3.5 x 4.5 x 3.0 cm heterogenous solid mass in the pancreatic head/uncinate process with upstream main pancreatic ductal dilatation. The mass abuts the superior mesenteric vein and second portion of the duodenum. Mass also contacts the proximal SMA along the left than 180 degrees of its circumference. No pulmonary embolism.  MRCP 24: Pancreatic head neoplasm. Severe intrahepatic and extrahepatic biliary ductal dilatation. Common bile duct tapers in the region of the pancreatic mass and contains layering debris. Distal common bile duct enhancement, possibly postprocedural.  EUS/ ERCP 24: Biliary stricture s/p sphincterotomy, brushing, and stent placement (10 mm x 60 mm FC-SEMS). Endosonographic finding notable for a mass in the uncinate process of the pancreas s/p FNB x3. Pathology: Invasive moderately-differentiated adenocarcinoma  Had persistent elevated LFTs inpatient. Repeat MRCP & abd xray on 24 without evidence of stent, possibly dislodged. S/p repeat ERCP and metal stent placement on 24  ECO Has generalized weakness. Prior to diagnosis ECOG 0. Family ca hx: mother, sister- breast ca, unsure if genetic testing  Labs: 24 Ca 19-9: 2142 CEA: 14.5 Tbili 27.8  24 AST: 84 ALT: 40 ALP: 461 Tbili: 22.2. -------------------------------------------------------------------------------------------------------------------------------------------- 2024: Mr. Brooks was seen as [facility] inpatient when he presented with pruritus and severe jaundice.  Workup demonstrated a locally advanced HOP adenocarcinoma.    He had ERCP/covered stent placed that migrated out, resulting in second ERCP with uncovered metal stent.  Upon discharge, he has started downward trend of bilirubin.  AXR showed expanded stent in appropriate position. Since discharge, patient reports feeling weak with poor appetite.  He has chills.

## 2024-06-07 NOTE — CHART NOTE - NSCHARTNOTEFT_GEN_A_CORE
GI consulted for possible cholangitis    63 year old male w/ a PMH includes HTN, HLD, type 2 DM (on insulin), hyperthyroidism, asthma, GERD, and RAMANA (not on CPAP). PSH of open cholecystectomy (15 yrs ago), appendectomy in (1975) and pancreatic cancer (s/p repeat ERCP and metal stent placement on 5/29/24) presented to surg oncology clinic (Dr. Jose Angel Guevara) today w/ chills and weakness since discharge last Friday (5/31), concerned for possible infection. Patient underwent last ERCP on 5/29, debris which was swept from the balloon extraction, and also had biliary stricture s/p stent placement. On admission, afebrile with no leukocytosis, and bilirubin has been downtrending to 17 along with other liver enzymes. CT A/P showed biliary stent in place w/ pneumobilia. Less concerned for occlusion of the stent or cholangitis at this time. So indication for ERCP at this time.       Recommendations:   - Less concerned for cholangitis or occlusion of stent, no indication for ERCP at this time.   - Rest of the care per primary team.     Discussed the case with Dr. Goodwin.     Thank you for the consult. Please call us back if you have any questions or concerns.     Violetta Casanova, PGY-5  Gastroenterology/Hepatology Fellow  Available on [employer] Teams  [phone] (Short Range Pager)  [phone] (Long Range Pager)    After 5pm, please contact the on-call GI fellow.
Patient to be seen Monday as new consult.   Discussed with primary team.

## 2024-06-07 NOTE — DIETITIAN INITIAL EVALUATION ADULT - OTHER INFO
Patient is a 63y Male with PMH HTN, HLD, type 2 DM (on insulin), hyperthyroidism, asthma, GERD, and RAMANA (not on CPAP). PSH of open cholecystectomy (15 yrs ago), appendectomy in (1975) and known Pancreatic head neoplasm (s/p repeat ERCP and metal stent placement on 5/29/24) presented to surg oncology clinic (Dr. Jose Angel Guevara) today with chills and weakness since discharge last Friday (5/31).     Noted provision of IV hydration (lactated ringers) per medication list. Patient previously NPO, which patient expressed frustrations over. Diet advanced this morning, writer present when lunch tray was delivered to which patient expressed distaste. Patient with multiple food preferences, requests coffee with milk, cold cereal, fruits, gingerale, yogurt, fruit juice. Of note, patient with PMH type 2 diabetes mellitus and HbA1c 9% (5/18). Per previous RD encounter (5/20), patient had received nutrition education in the context of DM management. However, during today's encounter, patient does not appear receptive to previously discussed recommendations as he expressed frustration. Further education not appropriate at this time secondary to patient's disinterest.

## 2024-06-07 NOTE — DIETITIAN INITIAL EVALUATION ADULT - PERTINENT LABORATORY DATA
06-06    130<L>  |  98  |  21  ----------------------------<  131<H>  4.6   |  16<L>  |  1.46<H>    Ca    8.4      06 Jun 2024 18:35    TPro  6.6  /  Alb  2.3<L>  /  TBili  17.6<H>  /  DBili  x   /  AST  117<H>  /  ALT  42<H>  /  AlkPhos  410<H>  06-06  POCT Blood Glucose.: 114 mg/dL (06-07-24 @ 11:48)  A1C with Estimated Average Glucose Result: 9.0 % (05-18-24 @ 06:35)

## 2024-06-07 NOTE — DIETITIAN INITIAL EVALUATION ADULT - PERTINENT MEDS FT
MEDICATIONS  (STANDING):  famotidine    Tablet 20 milliGRAM(s) Oral daily  heparin   Injectable 5000 Unit(s) SubCutaneous every 8 hours  hydrOXYzine hydrochloride 25 milliGRAM(s) Oral every 8 hours  piperacillin/tazobactam IVPB.. 3.375 Gram(s) IV Intermittent every 8 hours  sodium chloride 0.9%. 1000 milliLiter(s) (120 mL/Hr) IV Continuous <Continuous>  ursodiol Tablet 250 milliGRAM(s) Oral two times a day    MEDICATIONS  (PRN):

## 2024-06-07 NOTE — DIETITIAN INITIAL EVALUATION ADULT - CALCULATED TO (CAL/KG)
Faxed last office notes to [facility] and script - Dr Foreign Lundberg will stop to see Kim Garcia between 12 & 1 today [phone]

## 2024-06-07 NOTE — ASSESSMENT
[FreeTextEntry1] : 62 y/o male with locally advanced HOP cancer, s/p stent placement. Presents to office after discharge with weakness/chills/persistent jaundice. Concern for cholangitis/pancreatitis. Discussed with the patient and his family - transfer to [facility] ED for workup, likely admission.

## 2024-06-07 NOTE — DIETITIAN NUTRITION RISK NOTIFICATION - ADDITIONAL COMMENTS/DIETITIAN RECOMMENDATIONS
Please see Dietitian Initial Assessment for complete recommendations.     Myrna Lemons MS RDN CDN  On Microsoft Teams, Pager #[phone]

## 2024-06-07 NOTE — PHYSICAL EXAM
[Normal] : not distended, non tender, no masses, no hepatosplenomegaly [de-identified] : ill-appearing/weak [de-identified] : severe scleral icterus

## 2024-06-07 NOTE — DIETITIAN INITIAL EVALUATION ADULT - ORAL NUTRITION SUPPLEMENTS
Consider Glucberenice Laboyke (provides 220kcal, 10gms protein per serving) BID to support nutrition needs

## 2024-06-07 NOTE — REASON FOR VISIT
[Follow-Up Visit] : a follow-up visit for [Pancreatic Cancer] : pancreatic cancer [Spouse] : spouse [Other: _____] : [unfilled]

## 2024-06-07 NOTE — DIETITIAN INITIAL EVALUATION ADULT - ENTER FROM (CAL/KG)
Patient: Yancy Benavides Date: 2022   : 1967    55 year old male      INPATIENT WOUND CARE PROGRESS NOTE    Supervising Wound Care / Hyperbaric Medicine Physician: Dr. Amalia Velazco  Consulting Provider:  Denis Henriquez MD  Date of Consultation/Last Comprehensive Exam:  2021  Referring  Provider:  Dr. aMry Lou MD    SUBJECTIVE:    Chief Complaint:  Left hand wounds, sacral wound     Wound/Ulcer Present:  Non-healing surgical wound  Additional Wound Category: None  Maximum Baseline Ambulatory Status:  Non-ambulatory    History of Present Illness:  This is a 55 year old male with PMH significant for PE, CAD , DM & ESRD on KD. H/o abscess /OM to Right thumb s/p amputation 21. Resides in an assisted living at baseline. History of multiple traumatic finger wounds with amputations as follows:     2021 - S/P Left index finger amputation at the PIP joint - Dr. Mayelin MD; osteomyelitis noted on pathology    2021 - S/P Revision amputation LIF; Irrigation/debridement LIF tendon sheath, excision tendon tissue - Dr. Mercado., MD     2021 - S/P I&D and staged wound closure of the Left index finger - Dr. Mary Lou MD.     Admitted 2022 with complaints of left hand pain with concern for osteomyelitis and placed on antibiotics.  Was found down and pulseless 22 with ROSC after 3 rounds of EPI and 4 rounds of CPR, calcium and bicarb.  Was in a wide complex tachycardia and underlying RBBB consistent with PEA.     Wound care provider team signed off as of 22. Wound care RN team has consulted  and has been following for coccyx wound.  Developed a sacral decubitus ulcer that was debrided by Dr. Jinny Leung and Reynold Lopez on 10/2/2022.   Repeat surgical debridement on 10/30/22 by Dr. Glover.  NPWT was attempted but did not hold due to zora-wound dermatitis and frequent stooling.     Interval history 2022  Wound check today.   Remains on vent,  non-responsive.     Current Treatment Regimen:  Dressing:  See below   Frequency:  See below   Changed by:  See below    Review of Systems:  Review of systems not obtained due to patient factors.     Past Medical History:   Diagnosis Date   • Blood clot associated with vein wall inflammation     PE in january   • Brain anoxic injury (CMS/HCC)     in a coma  for 6 days,    • Congestive cardiac failure (CMS/[facility])    • COPD (chronic obstructive pulmonary disease) (CMS/[facility])    • Coronary artery disease    • Diabetes mellitus (CMS/[facility])    • Encounter for removal of cardiac resynchronization therapy pacemaker (CRT-P) 2019    possible due to infection   • ESRD (end stage renal disease) (CMS/[facility])    • Essential (primary) hypertension    • Heel ulcer (CMS/[facility])     and midfoot right with necrosis muscle   • Other ascites 2020   • Pacemaker    • PAD (peripheral artery disease) (CMS/[facility])    • Pneumonia    • Pulmonary HTN (CMS/[facility])    • Sepsis due to methicillin susceptible Staphylococcus aureus (CMS/[facility]) 12/7/2018   • Sleep apnea     cpap   • Staphylococcus aureus bacteremia 12/7/2018   • Stroke (CMS/[facility])     pt denies   • UTI due to Klebsiella species 12/7/2018     Past Surgical History:   Procedure Laterality Date   • Cardiac surgery  nov 4 1025    triple bypass   • Finger amputation Left 11/09/2021    L index finger at PIP joint level for necrosis   • Hand finger surgery unlisted Right 06/23/2021    Right thumb I & D (Dr. Hardin)   • Hand finger surgery unlisted Right 08/19/2021    Revision Right thumb amputation (Dr. Hardin)   • Hand surgery Left 12/24/2021    Revision amp LIF, tendon sheath I&D   • Hand surgery Left 12/28/2021    I&D wound closure LIF   • Ir paracentesis  06/25/2020    5200 ml removed   • Ir paracentesis  06/29/2020    2000 ml removed   • Ir temporary dialysis catheter Left 06/21/2020    left IJ dialysis catheter - removed 6/23/20   • Picc line insertion Right    • Tunnel dialysis catheter insertion  Right 2020   • Wound debridement  10/02/2022    sacral wound debridement   • Wound debridement  10/30/2022    sacral ulcer     Social History     Socioeconomic History   • Marital status:      Spouse name: Samantha   • Number of children: 1   • Years of education: Not on file   • Highest education level: Not on file   Occupational History     Employer: [employer]   Tobacco Use   • Smoking status: Former Smoker     Quit date: 1993     Years since quittin.8   • Smokeless tobacco: Never Used   Vaping Use   • Vaping Use: never used   Substance and Sexual Activity   • Alcohol use: No     Comment: Rare, once per week   • Drug use: No   • Sexual activity: Not on file   Other Topics Concern   • Not on file   Social History Narrative    ** Merged History Encounter **          Social Determinants of Health     Financial Resource Strain: Not on file   Food Insecurity: Not on file   Transportation Needs: Not on file   Physical Activity: Not on file   Stress: Not on file   Social Connections: Not on file   Intimate Partner Violence: Not At Risk   • Social Determinants: Intimate Partner Violence Past Fear: No   • Social Determinants: Intimate Partner Violence Current Fear: No     Family History   Problem Relation Age of Onset   • Diabetes Maternal Grandfather    • Cancer Mother         Breast   • Cancer Paternal Grandmother         ?   • Heart Brother          age 33       Current Facility-Administered Medications   Medication   • sodium chloride (NORMAL SALINE) 0.9 % bolus 100-200 mL   • sodium chloride (NORMAL SALINE) 0.9 % bolus 100-200 mL   • sodium chloride (NORMAL SALINE) 0.9 % bolus 100-200 mL   • sodium zirconium cyclosilicate (LOKELMA) packet 10 g   • albumin human (SPA) 25 % injection 12.5 g   • sodium hypochlorite (DAKIN'S) 0.062 % (1/8 strength) irrigation solution 1 application   • insulin glargine (LANTUS) injection 30 Units   • LORazepam (ATIVAN) injection 1 mg   • insulin  lispro (ADMELOG,HumaLOG) - Scheduled Mealtime Dose   • HYDROcodone-acetaminophen (NORCO)  MG per tablet 1 tablet    Or   • HYDROcodone-acetaminophen (NORCO) 5-325 MG per tablet 1 tablet   • dextrose (GLUTOSE) 40 % gel 15 g   • dextrose (GLUTOSE) 40 % gel 30 g   • midodrine (PROAMATINE) tablet 20 mg   • sodium hypochlorite (DAKIN'S) 0.062 % (1/8 strength) irrigation solution 1 application   • apixaBAN (ELIQUIS) tablet 2.5 mg   • morphine injection 2 mg   • ondansetron (ZOFRAN ODT) disintegrating tablet 4 mg    Or   • ondansetron (ZOFRAN) injection 4 mg   • metoCLOPramide (REGLAN) tablet 2.5 mg   • chlorproMAZINE (THORAZINE) tablet 25 mg   • [Held by provider] sevelamer carbonate (RENVELA) oral packet 800 mg   • hydrALAZINE (APRESOLINE) tablet 10 mg   • petrolatum (white)-mineral oil (LUBRIFRESH PM) ophthalmic ointment 1 application   • docusate sodium-sennosides (SENOKOT S) 50-8.6 MG 2 tablet   • polyethylene glycol (MIRALAX) packet 17 g   • epoetin yobany-epbx (RETACRIT) 66923 UNIT/ML injection 10,000 Units   • dextrose 50 % injection 25 g   • dextrose 50 % injection 12.5 g   • glucagon (GLUCAGEN) injection 1 mg   • insulin lispro (ADMELOG,HumaLOG) - Correction Dose   • sodium chloride (PF) 0.9 % injection 2 mL   • acetaminophen (TYLENOL) tablet 650 mg   • pantoprazole (PROTONIX) 40 MG/20ML (compounded) suspension 40 mg   • povidone-iodine (BETADINE) 10 % ointment   • ipratropium-albuterol (DUONEB) 0.5-2.5 (3) MG/3ML nebulizer solution 3 mL   • chlorhexidine gluconate (PERIDEX) 0.12 % solution 15 mL    And   • chlorhexidine gluconate (PERIDEX) 0.12 % solution 15 mL   • sodium chloride 0.9% infusion   • sodium chloride 0.9% infusion   • bisacodyl (DULCOLAX) suppository 10 mg   • Magnesium Standard Replacement Protocol      ALLERGIES:  Aspirin, Heparin (porcine), Keflex, and Pork derived products   (food or med)    OBJECTIVE:  Vital Signs:    Visit Vitals  /63 (BP Location: RUE - Right upper extremity,  Patient Position: Semi-Huang's)   Pulse 64   Temp 97.9 °F (36.6 °C) (Axillary)   Resp 16   Ht 5' 9\" (1.753 m)   Wt 107 kg (235 lb 14.3 oz)   SpO2 100%   BMI 34.84 kg/m²       Physical Exam:  General appearance: Alert, in no distress and cooperative  Pulmonary: oxygen dependent and respirations non-labored on the vent    Ulcer and Wound: see below     Open sacral ulcer, full thickness with dry bone centrally. Nonviable tissue present to superior wound bed. Inferior wound bed is granular. Minimal partial thickness breakdown to periwound.    Continues with dry gangrene to distal Left D3 and D5.    Left fingers 11/8/22 11/8/22 sacrum        11/1/22 post 10/30/22 surgical debridement.        10/27/22 Sacrum         10/19/22         10/4/2022  Sacrum  7.5 x 7 x 2cm       9/21/2022        Wound Bed Quality: See above   Delma-wound Quality: See above  Additional Descriptors: See above    Wound Measurements Per Flowsheet:     Wound Toe, 2nd Left Anterior Blister (Active)   Number of days: 94       Wound Finger, thumb Left Soft Tissue Necrosis (Active)   Wound Length (cm) 3 cm 09/09/22 2000   Number of days: 94       Wound Finger, 3rd Left Soft Tissue Necrosis (Active)   Number of days: 94       Wound FInger, 4th Left (Active)   Number of days: 94       Wound Finger, 5th Left (Active)   Wound Length (cm) 1.7 cm 09/09/22 2000   Wound Width (cm) 3 cm 09/09/22 2000   Wound Surface Area (cm^2) 5.1 cm^2 09/09/22 2000   Number of days: 94       Wound Knee Right Anterior Soft Tissue Necrosis (Active)   Number of days: 93       Wound Sternum (Active)   Number of days: 89       Wound Neck Midline/Middle Incision (Active)   Number of days: 77       Wound Lip(s) Friction/Shear (Active)   Number of days: 74       Wound Coccyx Pressure Injury (Active)   Wound Length (cm) 11 cm 11/01/22 0835   Wound Width (cm) 7.2 cm 11/01/22 0835   Wound Depth (cm) 4 cm 11/01/22 0835   Wound Surface Area (cm^2) 79.2 cm^2 11/01/22 0835   Wound  Volume (cm^3) 316.8 cm^3 11/01/22 0835   Number of days: 66       Wound Neck Pressure Injury (Active)   Wound Length (cm) 1 cm 09/08/22 0400   Wound Width (cm) 1 cm 09/08/22 0400   Wound Surface Area (cm^2) 1 cm^2 09/08/22 0400   Number of days: 61       Wound Ear Right Pressure Injury (Active)   Number of days: 13     PROCEDURE: None performed   Procedure was Performed by:  Not applicable      Laboratory results:  PREALBUMIN (mg/dL)   Date Value   01/13/2014 20.8   01/08/2014 21.3     Prealbumin (mg/dL)   Date Value   12/24/2021 21.0         Albumin (g/dL)   Date Value   10/26/2022 2.1 (L)   10/21/2022 2.4 (L)   10/13/2022 2.3 (L)      Absolute Lymphocytes (K/mcL)   Date Value   10/26/2022 1.5   10/13/2022 1.2   10/12/2022 1.3   02/14/2021 0.5 (L)   02/13/2021 1.0   02/12/2021 1.9      No results found for: ZINC      TSH (mcUnits/mL)   Date Value   02/22/2022 1.118   09/20/2021 1.100   08/06/2021 5.360 (H)    T4, FREE (ng/dL)   Date Value   04/09/2021 1.12   11/10/2017 1.1     T4, Free (ng/dL)   Date Value   08/07/2021 0.9             Recent Labs   Lab 11/07/22  1811 11/07/22  2358 11/08/22  0551   GLUCOSE BEDSIDE 152* 119* 109*     Lab Results   Component Value Date    GLUCOSE 197 (H) 11/07/2022    HGBA1C 7.0 (H) 09/14/2021    HGBA1C 4.8 02/24/2021    JYACVEXJ3J 6.3 (A) 07/28/2022    NGLVSDTM0C 6.3 (A) 01/11/2022       Lab Results   Component Value Date    WBC 7.3 11/07/2022    CRP 3.5 (H) 12/23/2021    RESR 30 (H) 02/21/2022    CREATININE 4.36 (H) 11/07/2022    GFRESTIMATE 15 (L) 11/07/2022        Infection/inflammation lab results summary:   Recent Labs   Lab 11/07/22  0345 11/03/22  0403 10/31/22  0730 10/27/22  0422 10/26/22  0657 10/24/22  0351 10/20/22  0957 10/13/22  0340 10/12/22  0405 10/11/22  0403 10/10/22  0357 10/09/22  0820 10/08/22  0409 10/07/22  0325 10/06/22  0335 10/05/22  0335 10/04/22  0331 10/03/22  0357   Absolute Neutrophils  --   --   --   --  10.3*  --   --  5.2 4.3 5.2 5.9 5.2 5.2 5.4  5.7 5.2 4.3 4.2   WBC 7.3 9.0 9.9 12.4* 13.4* 11.4* 8.8 7.6 6.6 7.4 7.6 7.6 7.7 8.0 8.6 7.9 6.3 6.4      Recent Labs   Lab 09/03/22  2210 09/03/22  1825 09/03/22  1558 09/03/22  1420 08/06/22  2219 08/06/22  0133 08/05/22  2333 02/21/22 2051 02/21/22 2010 02/06/22  1543 12/23/21 2131   C-Reactive Protein  --   --   --   --   --   --   --   --   --   --  3.5*   RBC Sedimentation Rate  --   --   --   --   --   --   --   --  30*  --  15   Lactate, Venous 2.2* 4.1*   < >  --    < >  --   --    < >  --    < >  --    VLA  --   --   --   --   --  1.4 2.1*   < >  --    < >  --    MRSA PCR  --   --   --  Not Detected  --   --   --   --   --   --   --     < > = values in this interval not displayed.      COVID-related results:   Rapid SARS-COV-2 by PCR (no units)   Date Value   10/01/2022 Not Detected   08/22/2022 Not Detected   08/05/2022 Not Detected   02/21/2022 Not Detected   02/06/2022 Not Detected   12/28/2021 Not Detected   12/23/2021 Not Detected   11/08/2021 Not Detected   11/02/2021 Not Detected   09/20/2021 Not Detected   09/13/2021 Not Detected   08/19/2021 Not Detected   08/05/2021 Not Detected   06/23/2021 Not Detected   06/16/2021 Not Detected   06/12/2021 Not Detected   05/26/2021 Not Detected   01/30/2021 Not Detected   06/30/2020 Not Detected     SARS-CoV-2 by PCR (no units)   Date Value   08/22/2022 Not Detected   10/06/2020 Not Detected   09/14/2020 Not Detected   06/21/2020 Not Detected   05/27/2020 NOT DETECTED      Culture results:  Specimen Description (no units)   Date Value   12/27/2019 BLOOD, PERIPHERAL HAND, RIGHT   12/27/2019 BLOOD, PERIPHERAL HAND, LEFT   12/25/2019 BLOOD, PERIPHERAL ANTECUBITAL,LEFT   12/25/2019 BLOOD, PERIPHERAL HAND, RIGHT     Gram Stain (no units)   Date Value   10/30/2022 Few Gram positive cocci. (AA)   10/30/2022 Few Polymorphonuclear cells. (AA)   10/30/2022 No epithelial cells seen. (AA)   10/30/2022 Moderate Gram negative bacilli. (AA)     CULTURE WITH GRAM STAIN,  ANAEROBE/AEROBE (no units)   Date Value   10/30/2022 Many Enterobacter cloacae complex (AA)   10/30/2022 Many Enterococcus faecalis (AA)   10/30/2022 Few Pseudomonas aeruginosa (AA)   10/30/2022 Many Mixed aerobic matthew   10/30/2022 Many Bacteroides fragilis (AA)   10/30/2022 Many Finegoldia magna (AA)     CULTURE WITH GRAM STAIN, AEROBIC (no units)   Date Value   01/20/2022 Rare Staphylococcus coagulase negative (A)   01/06/2022 No Growth 2 Days.   09/19/2021 Few Proteus mirabilis (A)   09/19/2021 Few Enterococcus faecalis (A)     CULTURE (no units)   Date Value   12/27/2019 NO GROWTH 5 DAYS.   12/27/2019 NO GROWTH 5 DAYS.   12/25/2019 (P)    STAPHYLOCOCCUS AUREUS, METHICILLIN RESISTANT Infectious disease consultation strongly recommended.   12/25/2019 (P)    MRSA DETECTED BY NUCLEIC ACID AMPLIFICATION. Testing was performed using polymerase chain reaction. This assay is FDA approved for use with nasal swabs. Performance characteristics of this assay using blood, axilla, groin, or combined nares/groin   specimens were determined by the [facility] Microbiology Department. This test has not been cleared or approved by the U.S. Food and Drug Administration for use with blood, axilla, groin, or combined nares/groin specimens. This test is used for   clinical purposes.  It should not be regarded as investigational or for research.  This laboratory is certified under the Clinical Laboratory Improvement Amendments of 1988 (CLIA) as qualified to perform high complexity clinical laboratory testing.     12/25/2019     MRSA DETECTED BY NUCLEIC ACID AMPLIFICATION. CALLED TO, READ BACK AND CONFIRMED ASH COLVIN AT 0329 12/27/2019 BY [id number]     ORGANISM (no units)   Date Value   12/25/2019 STAPHYLOCOCCUS AUREUS, METHICILLIN RESISTANT   11/13/2017 ESCHERICHIA COLI           Diagnostic Assessments Reviewed:       11/04/2021 - LEFT  UPPER EXTREMITY DUPLEX DOPPLER COLOR-FLOW ARTERIAL ULTRASOUND: IMPRESSION:  Patent left upper  extremity arterial supply. Predominantly monophasic Doppler waveform morphology is likely at least in part due to the presence of an AV graft. No evidence of arterial thromboembolism.     11/06/2021 - US LOWER EXTREMITY ARTERIES DUPLEX RIGHT: IMPRESSION:   1. Diffuse atherosclerosis. There is no evidence for significant inflow or femoropopliteal stenosis.  2. There is three-vessel runoff with evidence of significant peroneal artery stenosis.    XR HAND 3+ VIEW LEFT  12/23/21 IMPRESSION:  1.  Interval amputation of the second digit at the PIP joint with soft tissue swelling and irregularity at the amputation site. No radiologic evidence of osteomyelitis at the second proximal phalanx.   2. No acute fracture.  3. Several areas of juxta-articular erosions are seen within the left hand and left wrist. Findings may be related to erosive osteoarthritis or possible psoriatic arthritis. Clinical correlation suggested.    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     Stable dry gangrene of left 3rd and 5th digit.     Sacral wound   -s/p debridement 10/02/22 (Dr. Leung and Dr. Lopez) that is deteriorating with more necrotic tissue today compared to prior.  -Post debridement on 10/30.     Remains a poor candidate for VAC at this time due to wound  base and alck of fecal diversion.   Periwound condition is improved.   No new pressure changes.     Topical Care:  Left finger wounds: Betadine paint to dry wounds  eschars daily.   Sacrum: Dakins moist packing twice a day and PRN.     Offloading:  Patient requires aggressive offloading with frequent position changes every 1-2 hours  Dolphin mattress   Prevalon boots at all times while in bed    Infection:  Allergic to cephalexin; ESRD on HD; not on warfarin, which he has been on in the past  XR of hand negative for OM.   Clinical sacral OM.   October surgical culture is polymicrobial- observing off antibiotics. Dr. Preston following.     Vascular:   Seen by Vascular Surgery during hospital stay in early November 2021:  - On exam, bilateral radial pulse +1 and hands warm. Left index finger with black to purple discoloration, cool to touch, edematous. Right hand index finger also with purple discoloration to the distal tip. Scattered wounds to hands.   - No vascular surgery interventions indicated at this time          - Consider HOLLY evaluation for embolic source, b/l extremity ischemia to distal fingers   HOLLY 11/11/21 did not show thrombus or vegetation     Endocrine:    Patient is diabetic by History; on insulin with fair control; HgbA1C 6.3% on 1/11/22  No history of thyroid disease; TSH normal in September 2021     Nutrition:    Tube feeds - RD following    Hyperbaric:    Not indicated     Will continue to see weekly and PRN.     Plan of Care:  Advanced Wound Care Recommendations:  See above  Percent Wound Closure from consult:  See above    Significant note data copied forward from [facility] [place] and reviewed by me as needed in the formulation of this note and plan.    COLLIN Núñez             28

## 2024-06-08 LAB
ALBUMIN SERPL ELPH-MCNC: 2.2 G/DL — LOW (ref 3.3–5)
ALP SERPL-CCNC: 343 U/L — HIGH (ref 40–120)
ALT FLD-CCNC: 36 U/L — SIGNIFICANT CHANGE UP (ref 4–41)
ANION GAP SERPL CALC-SCNC: 15 MMOL/L — HIGH (ref 7–14)
AST SERPL-CCNC: 75 U/L — HIGH (ref 4–40)
BILIRUB SERPL-MCNC: 13.1 MG/DL — HIGH (ref 0.2–1.2)
BUN SERPL-MCNC: 14 MG/DL — SIGNIFICANT CHANGE UP (ref 7–23)
CALCIUM SERPL-MCNC: 7.8 MG/DL — LOW (ref 8.4–10.5)
CHLORIDE SERPL-SCNC: 102 MMOL/L — SIGNIFICANT CHANGE UP (ref 98–107)
CO2 SERPL-SCNC: 17 MMOL/L — LOW (ref 22–31)
CREAT SERPL-MCNC: 1.44 MG/DL — HIGH (ref 0.5–1.3)
CULTURE RESULTS: ABNORMAL
CULTURE RESULTS: ABNORMAL
EGFR: 54 ML/MIN/1.73M2 — LOW
GLUCOSE BLDC GLUCOMTR-MCNC: 110 MG/DL — HIGH (ref 70–99)
GLUCOSE BLDC GLUCOMTR-MCNC: 120 MG/DL — HIGH (ref 70–99)
GLUCOSE BLDC GLUCOMTR-MCNC: 126 MG/DL — HIGH (ref 70–99)
GLUCOSE BLDC GLUCOMTR-MCNC: 174 MG/DL — HIGH (ref 70–99)
GLUCOSE SERPL-MCNC: 115 MG/DL — HIGH (ref 70–99)
HCT VFR BLD CALC: 27.5 % — LOW (ref 39–50)
HGB BLD-MCNC: 9.5 G/DL — LOW (ref 13–17)
MAGNESIUM SERPL-MCNC: 2.2 MG/DL — SIGNIFICANT CHANGE UP (ref 1.6–2.6)
MCHC RBC-ENTMCNC: 32.6 PG — SIGNIFICANT CHANGE UP (ref 27–34)
MCHC RBC-ENTMCNC: 34.5 GM/DL — SIGNIFICANT CHANGE UP (ref 32–36)
MCV RBC AUTO: 94.5 FL — SIGNIFICANT CHANGE UP (ref 80–100)
NRBC # BLD: 0 /100 WBCS — SIGNIFICANT CHANGE UP (ref 0–0)
NRBC # FLD: 0 K/UL — SIGNIFICANT CHANGE UP (ref 0–0)
ORGANISM # SPEC MICROSCOPIC CNT: ABNORMAL
ORGANISM # SPEC MICROSCOPIC CNT: ABNORMAL
PHOSPHATE SERPL-MCNC: 2.7 MG/DL — SIGNIFICANT CHANGE UP (ref 2.5–4.5)
PLATELET # BLD AUTO: 184 K/UL — SIGNIFICANT CHANGE UP (ref 150–400)
POTASSIUM SERPL-MCNC: 3.4 MMOL/L — LOW (ref 3.5–5.3)
POTASSIUM SERPL-SCNC: 3.4 MMOL/L — LOW (ref 3.5–5.3)
PROT SERPL-MCNC: 5.8 G/DL — LOW (ref 6–8.3)
RBC # BLD: 2.91 M/UL — LOW (ref 4.2–5.8)
RBC # FLD: 23.7 % — HIGH (ref 10.3–14.5)
SODIUM SERPL-SCNC: 134 MMOL/L — LOW (ref 135–145)
SPECIMEN SOURCE: SIGNIFICANT CHANGE UP
SPECIMEN SOURCE: SIGNIFICANT CHANGE UP
WBC # BLD: 10.31 K/UL — SIGNIFICANT CHANGE UP (ref 3.8–10.5)
WBC # FLD AUTO: 10.31 K/UL — SIGNIFICANT CHANGE UP (ref 3.8–10.5)

## 2024-06-08 PROCEDURE — 99232 SBSQ HOSP IP/OBS MODERATE 35: CPT

## 2024-06-08 RX ORDER — POLYETHYLENE GLYCOL 3350 17 G/17G
17 POWDER, FOR SOLUTION ORAL DAILY
Refills: 0 | Status: DISCONTINUED | OUTPATIENT
Start: 2024-06-08 | End: 2024-06-09

## 2024-06-08 RX ORDER — POTASSIUM CHLORIDE 20 MEQ
10 PACKET (EA) ORAL
Refills: 0 | Status: COMPLETED | OUTPATIENT
Start: 2024-06-08 | End: 2024-06-08

## 2024-06-08 RX ORDER — POLYETHYLENE GLYCOL 3350 17 G/17G
17 POWDER, FOR SOLUTION ORAL ONCE
Refills: 0 | Status: COMPLETED | OUTPATIENT
Start: 2024-06-08 | End: 2024-06-08

## 2024-06-08 RX ORDER — MAGNESIUM SULFATE 500 MG/ML
2 VIAL (ML) INJECTION ONCE
Refills: 0 | Status: COMPLETED | OUTPATIENT
Start: 2024-06-08 | End: 2024-06-08

## 2024-06-08 RX ORDER — SENNA PLUS 8.6 MG/1
2 TABLET ORAL AT BEDTIME
Refills: 0 | Status: DISCONTINUED | OUTPATIENT
Start: 2024-06-08 | End: 2024-06-09

## 2024-06-08 RX ADMIN — Medication 25 MILLIGRAM(S): at 05:49

## 2024-06-08 RX ADMIN — PIPERACILLIN AND TAZOBACTAM 25 GRAM(S): 4; .5 INJECTION, POWDER, LYOPHILIZED, FOR SOLUTION INTRAVENOUS at 13:05

## 2024-06-08 RX ADMIN — URSODIOL 250 MILLIGRAM(S): 250 TABLET, FILM COATED ORAL at 05:49

## 2024-06-08 RX ADMIN — Medication 25 MILLIGRAM(S): at 21:53

## 2024-06-08 RX ADMIN — HEPARIN SODIUM 5000 UNIT(S): 5000 INJECTION INTRAVENOUS; SUBCUTANEOUS at 13:05

## 2024-06-08 RX ADMIN — HEPARIN SODIUM 5000 UNIT(S): 5000 INJECTION INTRAVENOUS; SUBCUTANEOUS at 21:53

## 2024-06-08 RX ADMIN — PIPERACILLIN AND TAZOBACTAM 25 GRAM(S): 4; .5 INJECTION, POWDER, LYOPHILIZED, FOR SOLUTION INTRAVENOUS at 21:53

## 2024-06-08 RX ADMIN — Medication 100 MILLIEQUIVALENT(S): at 16:05

## 2024-06-08 RX ADMIN — FAMOTIDINE 20 MILLIGRAM(S): 10 INJECTION INTRAVENOUS at 13:04

## 2024-06-08 RX ADMIN — PIPERACILLIN AND TAZOBACTAM 25 GRAM(S): 4; .5 INJECTION, POWDER, LYOPHILIZED, FOR SOLUTION INTRAVENOUS at 05:54

## 2024-06-08 RX ADMIN — Medication 25 MILLIGRAM(S): at 13:05

## 2024-06-08 RX ADMIN — URSODIOL 250 MILLIGRAM(S): 250 TABLET, FILM COATED ORAL at 18:18

## 2024-06-08 RX ADMIN — Medication 100 MILLIEQUIVALENT(S): at 14:18

## 2024-06-08 RX ADMIN — Medication 100 MILLIEQUIVALENT(S): at 18:20

## 2024-06-08 RX ADMIN — Medication 25 GRAM(S): at 03:40

## 2024-06-08 RX ADMIN — Medication 10 MILLIGRAM(S): at 03:39

## 2024-06-08 RX ADMIN — HEPARIN SODIUM 5000 UNIT(S): 5000 INJECTION INTRAVENOUS; SUBCUTANEOUS at 05:50

## 2024-06-08 NOTE — PROGRESS NOTE ADULT - NUTRITIONAL ASSESSMENT
This patient has been assessed with a concern for Malnutrition and has been determined to have a diagnosis/diagnoses of Moderate protein-calorie malnutrition.    This patient is being managed with:   Diet Regular-  Consistent Carbohydrate {Evening Snack} (CSTCHOSN)  Entered: Jun 7 2024  8:18PM

## 2024-06-09 LAB
ALBUMIN SERPL ELPH-MCNC: 2 G/DL — LOW (ref 3.3–5)
ALP SERPL-CCNC: 315 U/L — HIGH (ref 40–120)
ALT FLD-CCNC: 34 U/L — SIGNIFICANT CHANGE UP (ref 4–41)
ANION GAP SERPL CALC-SCNC: 13 MMOL/L — SIGNIFICANT CHANGE UP (ref 7–14)
AST SERPL-CCNC: 84 U/L — HIGH (ref 4–40)
BILIRUB SERPL-MCNC: 11.5 MG/DL — HIGH (ref 0.2–1.2)
BUN SERPL-MCNC: 11 MG/DL — SIGNIFICANT CHANGE UP (ref 7–23)
CALCIUM SERPL-MCNC: 7.6 MG/DL — LOW (ref 8.4–10.5)
CHLORIDE SERPL-SCNC: 104 MMOL/L — SIGNIFICANT CHANGE UP (ref 98–107)
CO2 SERPL-SCNC: 16 MMOL/L — LOW (ref 22–31)
CREAT SERPL-MCNC: 1.38 MG/DL — HIGH (ref 0.5–1.3)
EGFR: 57 ML/MIN/1.73M2 — LOW
GLUCOSE BLDC GLUCOMTR-MCNC: 119 MG/DL — HIGH (ref 70–99)
GLUCOSE BLDC GLUCOMTR-MCNC: 148 MG/DL — HIGH (ref 70–99)
GLUCOSE BLDC GLUCOMTR-MCNC: 180 MG/DL — HIGH (ref 70–99)
GLUCOSE BLDC GLUCOMTR-MCNC: 219 MG/DL — HIGH (ref 70–99)
GLUCOSE SERPL-MCNC: 120 MG/DL — HIGH (ref 70–99)
HCT VFR BLD CALC: 26.5 % — LOW (ref 39–50)
HGB BLD-MCNC: 9 G/DL — LOW (ref 13–17)
MAGNESIUM SERPL-MCNC: 2 MG/DL — SIGNIFICANT CHANGE UP (ref 1.6–2.6)
MCHC RBC-ENTMCNC: 32.7 PG — SIGNIFICANT CHANGE UP (ref 27–34)
MCHC RBC-ENTMCNC: 34 GM/DL — SIGNIFICANT CHANGE UP (ref 32–36)
MCV RBC AUTO: 96.4 FL — SIGNIFICANT CHANGE UP (ref 80–100)
NRBC # BLD: 0 /100 WBCS — SIGNIFICANT CHANGE UP (ref 0–0)
NRBC # FLD: 0 K/UL — SIGNIFICANT CHANGE UP (ref 0–0)
PHOSPHATE SERPL-MCNC: 1.8 MG/DL — LOW (ref 2.5–4.5)
PLATELET # BLD AUTO: 184 K/UL — SIGNIFICANT CHANGE UP (ref 150–400)
POTASSIUM SERPL-MCNC: 4 MMOL/L — SIGNIFICANT CHANGE UP (ref 3.5–5.3)
POTASSIUM SERPL-SCNC: 4 MMOL/L — SIGNIFICANT CHANGE UP (ref 3.5–5.3)
PROT SERPL-MCNC: 5.9 G/DL — LOW (ref 6–8.3)
RBC # BLD: 2.75 M/UL — LOW (ref 4.2–5.8)
RBC # FLD: 23.9 % — HIGH (ref 10.3–14.5)
SODIUM SERPL-SCNC: 133 MMOL/L — LOW (ref 135–145)
WBC # BLD: 8.01 K/UL — SIGNIFICANT CHANGE UP (ref 3.8–10.5)
WBC # FLD AUTO: 8.01 K/UL — SIGNIFICANT CHANGE UP (ref 3.8–10.5)

## 2024-06-09 PROCEDURE — 99232 SBSQ HOSP IP/OBS MODERATE 35: CPT

## 2024-06-09 RX ORDER — SENNA PLUS 8.6 MG/1
2 TABLET ORAL ONCE
Refills: 0 | Status: COMPLETED | OUTPATIENT
Start: 2024-06-09 | End: 2024-06-09

## 2024-06-09 RX ORDER — CALAMINE AND ZINC OXIDE AND PHENOL 160; 10 MG/ML; MG/ML
1 LOTION TOPICAL ONCE
Refills: 0 | Status: COMPLETED | OUTPATIENT
Start: 2024-06-09 | End: 2024-06-09

## 2024-06-09 RX ADMIN — CALAMINE AND ZINC OXIDE AND PHENOL 1 APPLICATION(S): 160; 10 LOTION TOPICAL at 03:10

## 2024-06-09 RX ADMIN — HEPARIN SODIUM 5000 UNIT(S): 5000 INJECTION INTRAVENOUS; SUBCUTANEOUS at 05:16

## 2024-06-09 RX ADMIN — SENNA PLUS 2 TABLET(S): 8.6 TABLET ORAL at 21:42

## 2024-06-09 RX ADMIN — FAMOTIDINE 20 MILLIGRAM(S): 10 INJECTION INTRAVENOUS at 12:22

## 2024-06-09 RX ADMIN — URSODIOL 250 MILLIGRAM(S): 250 TABLET, FILM COATED ORAL at 17:27

## 2024-06-09 RX ADMIN — URSODIOL 250 MILLIGRAM(S): 250 TABLET, FILM COATED ORAL at 07:00

## 2024-06-09 RX ADMIN — Medication 1: at 17:27

## 2024-06-09 RX ADMIN — PIPERACILLIN AND TAZOBACTAM 25 GRAM(S): 4; .5 INJECTION, POWDER, LYOPHILIZED, FOR SOLUTION INTRAVENOUS at 21:09

## 2024-06-09 RX ADMIN — Medication 25 MILLIGRAM(S): at 14:22

## 2024-06-09 RX ADMIN — Medication 2: at 12:23

## 2024-06-09 RX ADMIN — PIPERACILLIN AND TAZOBACTAM 25 GRAM(S): 4; .5 INJECTION, POWDER, LYOPHILIZED, FOR SOLUTION INTRAVENOUS at 05:16

## 2024-06-09 RX ADMIN — Medication 25 MILLIGRAM(S): at 21:09

## 2024-06-09 RX ADMIN — Medication 25 MILLIGRAM(S): at 05:16

## 2024-06-09 RX ADMIN — HEPARIN SODIUM 5000 UNIT(S): 5000 INJECTION INTRAVENOUS; SUBCUTANEOUS at 21:10

## 2024-06-09 RX ADMIN — HEPARIN SODIUM 5000 UNIT(S): 5000 INJECTION INTRAVENOUS; SUBCUTANEOUS at 14:22

## 2024-06-09 RX ADMIN — Medication 85 MILLIMOLE(S): at 10:17

## 2024-06-09 RX ADMIN — PIPERACILLIN AND TAZOBACTAM 25 GRAM(S): 4; .5 INJECTION, POWDER, LYOPHILIZED, FOR SOLUTION INTRAVENOUS at 14:21

## 2024-06-09 NOTE — OCCUPATIONAL THERAPY INITIAL EVALUATION ADULT - PERTINENT HX OF CURRENT PROBLEM, REHAB EVAL
Pt is a 63 year old male with PMH includes HTN, HLD, type 2 DM (on insulin), hyperthyroidism, asthma, GERD, and RAMANA (not on CPAP). PSH of open cholecystectomy (15 yrs ago), appendectomy in (1975) and known Pancreatic head neoplasm (s/p repeat ERCP and metal stent placement on 5/29/24) presented to surg oncology clinic (Dr. Jose Angel Guevara) today w/ chills and weakness since discharge last Friday (5/31).

## 2024-06-09 NOTE — PROGRESS NOTE ADULT - ATTENDING COMMENTS
Clinically improved.  Diet ad harvinder.  Bilirubin downward trend.  DC planning early next week if continues to improve.
Improved.  Bilirubin trending down.  Continue empiric antibiotics pending final blood culture.  Continue physical therapy.
Slowly improving bilirubin.  Diet as tolerated.  Encouraged ambulation.

## 2024-06-09 NOTE — PHYSICAL THERAPY INITIAL EVALUATION ADULT - PERTINENT HX OF CURRENT PROBLEM, REHAB EVAL
63 year old male w/ a PMH includes HTN, HLD, type 2 DM (on insulin), hyperthyroidism, asthma, GERD, and RAMANA (not on CPAP). PSH of open cholecystectomy (15 yrs ago), appendectomy in (1975) and pancreatic cancer (s/p repeat ERCP and metal stent placement on 5/29/24) presented to surg oncology clinic (Dr. Jose Angel Guevara) today w/ chills and weakness since discharge last Friday (5/31). Patient was visibly shivering in the office so Dr. Guevara sent the patient in to the ED for a cholangitis/pancreatitis w/u.

## 2024-06-09 NOTE — OCCUPATIONAL THERAPY INITIAL EVALUATION ADULT - GENERAL OBSERVATIONS, REHAB EVAL
Patient found semi-reclined in bed, NAD, and able to follow directions. Vitals: HR 86 BPM. Patient agreeable to participate in skilled OT evaluation.

## 2024-06-09 NOTE — PROGRESS NOTE ADULT - ASSESSMENT
63 year old male w/ a PMH includes HTN, HLD, type 2 DM (on insulin), hyperthyroidism, asthma, GERD, and RAMANA (not on CPAP). PSH of open cholecystectomy (15 yrs ago), appendectomy in (1975) and known Pancreatic head neoplasm (s/p repeat ERCP and metal stent placement on 5/29/24) presented to surg oncology clinic (Dr. Jose Angel Guevara) today w/ chills and weakness since discharge last Friday (5/31).     Plan:  - Regular diet  - cont. IVF  - fingersticks q6h  - IV abx  - dvt ppx  - continue home antihistamines for pruritis  - GI c/s  - palliative c/s for symptomatic management  - Renal c/s for hyponatremia  - bowel reg    E-Team, [id number]
63 year old male w/ a PMH includes HTN, HLD, type 2 DM (on insulin), hyperthyroidism, asthma, GERD, and RAMANA (not on CPAP). PSH of open cholecystectomy (15 yrs ago), appendectomy in (1975) and known Pancreatic head neoplasm (s/p repeat ERCP and metal stent placement on 5/29/24) presented to surg oncology clinic (Dr. Jose Angel Guevara) today w/ chills and weakness since discharge last Friday (5/31).     Recommendations:  - pending blood cultures  - Regular diet  - cont. IVF  - fingersticks q6h  - IV abx  - dvt ppx  - continue home antihistamines for pruritis  - GI c/s  - palliative c/s for symptomatic management  - Renal c/s for hyponatremia    E-Team, [id number]
63 year old male w/ a PMH includes HTN, HLD, type 2 DM (on insulin), hyperthyroidism, asthma, GERD, and RAMANA (not on CPAP). PSH of open cholecystectomy (15 yrs ago), appendectomy in (1975) and known Pancreatic head neoplasm (s/p repeat ERCP and metal stent placement on 5/29/24) presented to surg oncology clinic (Dr. Jose Angel Guevara) today w/ chills and weakness since discharge last Friday (5/31).     Recommendations:  - f/u blood cultures  - Regular diet  - IVL  - IV Zosyn  - dvt ppx  - continue home antihistamines for pruritis  - GI c/s  - palliative c/s for symptomatic management  - Renal c/s for hyponatremia  - Stop Bowel regimen     E-Team, [id number]

## 2024-06-09 NOTE — PHYSICAL THERAPY INITIAL EVALUATION ADULT - ADDITIONAL COMMENTS
Pt lives in a home with adult children, no falls, fully independent, does not use DME.  Patients Current SpO2: 99%

## 2024-06-10 ENCOUNTER — TRANSCRIPTION ENCOUNTER (OUTPATIENT)
Age: 64
End: 2024-06-10

## 2024-06-10 VITALS
OXYGEN SATURATION: 100 % | HEART RATE: 87 BPM | RESPIRATION RATE: 17 BRPM | SYSTOLIC BLOOD PRESSURE: 107 MMHG | TEMPERATURE: 98 F | DIASTOLIC BLOOD PRESSURE: 60 MMHG

## 2024-06-10 DIAGNOSIS — Z51.5 ENCOUNTER FOR PALLIATIVE CARE: ICD-10-CM

## 2024-06-10 DIAGNOSIS — C25.9 MALIGNANT NEOPLASM OF PANCREAS, UNSPECIFIED: ICD-10-CM

## 2024-06-10 DIAGNOSIS — Z71.89 OTHER SPECIFIED COUNSELING: ICD-10-CM

## 2024-06-10 LAB
ALBUMIN SERPL ELPH-MCNC: 2.3 G/DL — LOW (ref 3.3–5)
ALP SERPL-CCNC: 327 U/L — HIGH (ref 40–120)
ALT FLD-CCNC: 36 U/L — SIGNIFICANT CHANGE UP (ref 4–41)
ANION GAP SERPL CALC-SCNC: 14 MMOL/L — SIGNIFICANT CHANGE UP (ref 7–14)
AST SERPL-CCNC: 72 U/L — HIGH (ref 4–40)
BILIRUB SERPL-MCNC: 10.5 MG/DL — HIGH (ref 0.2–1.2)
BUN SERPL-MCNC: 10 MG/DL — SIGNIFICANT CHANGE UP (ref 7–23)
CALCIUM SERPL-MCNC: 7.5 MG/DL — LOW (ref 8.4–10.5)
CHLORIDE SERPL-SCNC: 102 MMOL/L — SIGNIFICANT CHANGE UP (ref 98–107)
CO2 SERPL-SCNC: 17 MMOL/L — LOW (ref 22–31)
CREAT SERPL-MCNC: 1.27 MG/DL — SIGNIFICANT CHANGE UP (ref 0.5–1.3)
EGFR: 63 ML/MIN/1.73M2 — SIGNIFICANT CHANGE UP
GLUCOSE BLDC GLUCOMTR-MCNC: 119 MG/DL — HIGH (ref 70–99)
GLUCOSE BLDC GLUCOMTR-MCNC: 145 MG/DL — HIGH (ref 70–99)
GLUCOSE SERPL-MCNC: 83 MG/DL — SIGNIFICANT CHANGE UP (ref 70–99)
HCT VFR BLD CALC: 27 % — LOW (ref 39–50)
HGB BLD-MCNC: 9 G/DL — LOW (ref 13–17)
MAGNESIUM SERPL-MCNC: 2 MG/DL — SIGNIFICANT CHANGE UP (ref 1.6–2.6)
MCHC RBC-ENTMCNC: 32.1 PG — SIGNIFICANT CHANGE UP (ref 27–34)
MCHC RBC-ENTMCNC: 33.3 GM/DL — SIGNIFICANT CHANGE UP (ref 32–36)
MCV RBC AUTO: 96.4 FL — SIGNIFICANT CHANGE UP (ref 80–100)
NRBC # BLD: 0 /100 WBCS — SIGNIFICANT CHANGE UP (ref 0–0)
NRBC # FLD: 0 K/UL — SIGNIFICANT CHANGE UP (ref 0–0)
PHOSPHATE SERPL-MCNC: 2.3 MG/DL — LOW (ref 2.5–4.5)
PLATELET # BLD AUTO: 198 K/UL — SIGNIFICANT CHANGE UP (ref 150–400)
POTASSIUM SERPL-MCNC: 3.4 MMOL/L — LOW (ref 3.5–5.3)
POTASSIUM SERPL-SCNC: 3.4 MMOL/L — LOW (ref 3.5–5.3)
PROT SERPL-MCNC: 6 G/DL — SIGNIFICANT CHANGE UP (ref 6–8.3)
RBC # BLD: 2.8 M/UL — LOW (ref 4.2–5.8)
RBC # FLD: 22.6 % — HIGH (ref 10.3–14.5)
SODIUM SERPL-SCNC: 133 MMOL/L — LOW (ref 135–145)
WBC # BLD: 7.8 K/UL — SIGNIFICANT CHANGE UP (ref 3.8–10.5)
WBC # FLD AUTO: 7.8 K/UL — SIGNIFICANT CHANGE UP (ref 3.8–10.5)

## 2024-06-10 PROCEDURE — 99498 ADVNCD CARE PLAN ADDL 30 MIN: CPT | Mod: 25

## 2024-06-10 PROCEDURE — 99497 ADVNCD CARE PLAN 30 MIN: CPT | Mod: 25

## 2024-06-10 PROCEDURE — 99222 1ST HOSP IP/OBS MODERATE 55: CPT

## 2024-06-10 RX ORDER — POLYETHYLENE GLYCOL 3350 17 G/17G
17 POWDER, FOR SOLUTION ORAL DAILY
Refills: 0 | Status: DISCONTINUED | OUTPATIENT
Start: 2024-06-10 | End: 2024-06-10

## 2024-06-10 RX ORDER — POLYETHYLENE GLYCOL 3350 17 G/17G
17 POWDER, FOR SOLUTION ORAL
Qty: 340 | Refills: 0
Start: 2024-06-10

## 2024-06-10 RX ORDER — POTASSIUM CHLORIDE 20 MEQ
20 PACKET (EA) ORAL
Refills: 0 | Status: DISCONTINUED | OUTPATIENT
Start: 2024-06-10 | End: 2024-06-10

## 2024-06-10 RX ORDER — URSODIOL 250 MG/1
1 TABLET, FILM COATED ORAL
Qty: 30 | Refills: 0
Start: 2024-06-10

## 2024-06-10 RX ADMIN — Medication 25 MILLIGRAM(S): at 14:11

## 2024-06-10 RX ADMIN — HEPARIN SODIUM 5000 UNIT(S): 5000 INJECTION INTRAVENOUS; SUBCUTANEOUS at 06:21

## 2024-06-10 RX ADMIN — Medication 25 MILLIGRAM(S): at 06:21

## 2024-06-10 RX ADMIN — FAMOTIDINE 20 MILLIGRAM(S): 10 INJECTION INTRAVENOUS at 14:11

## 2024-06-10 RX ADMIN — URSODIOL 250 MILLIGRAM(S): 250 TABLET, FILM COATED ORAL at 06:21

## 2024-06-10 RX ADMIN — HEPARIN SODIUM 5000 UNIT(S): 5000 INJECTION INTRAVENOUS; SUBCUTANEOUS at 14:18

## 2024-06-10 RX ADMIN — PIPERACILLIN AND TAZOBACTAM 25 GRAM(S): 4; .5 INJECTION, POWDER, LYOPHILIZED, FOR SOLUTION INTRAVENOUS at 06:28

## 2024-06-10 NOTE — PROGRESS NOTE ADULT - SUBJECTIVE AND OBJECTIVE BOX
Surgery Progress Note     Interval/Subjective:  Patient seen and examined. No abdominal pain. Tolerating regular diet.   - decrease in t.bili   __________________________________________________________________  Vitals:  T(C): 36.5 (04:47), Max: 37.1 (21:20)  HR: 75 (04:47) (72 - 96)  BP: 112/66 (04:47) (102/61 - 129/83)  RR: 16 (04:47) (16 - 18)  SpO2: 100% (04:47) (97% - 100%)    I & O's:  IN:    Oral Fluid: 380 mL    sodium chloride 0.9%: 1320 mL  Total IN: 1700 mL    OUT:    Voided (mL): 1720 mL  Total OUT: 1720 mL    24 @ 07:01  -  24 @ 07:00  --------------------------------------------------------  OUT:    Voided (mL): 0.75 mL/kg/hr  Total OUT: 0.75 mL/kg/hr    Physical Exam:  General: NAD, resting comfortably in bed  HEENT: Normocephalic atraumatic, scleral icterus   Respiratory: Nonlabored respirations  Cardio: pulse present  Abdomen: soft, nontender, nondistended    Labs:  CBC: 24 @ 19:29          8.7   8.22 >----< 163          25.6    Chemistry: 24 @ 19:29  132|103|18    ------------< 151  3.7|17|1.44    Ca: 7.6 24 @ 19:29  M.80 24 @ 19:29  Phos: 3.0 24 @ 19:29    LFT's: 24 @ 19:29  AST: 75  ALT: 33  ALP: 332  T.Bili: 13.6  D.Bili: --  CBC: 24 @ 18:35          10.2   10.06 >----< 213          29.6    Chemistry: 24 @ 18:35  130|98|21    ------------< 131  4.6|16|1.46    Ca: 8.4 24 @ 18:35  Mg: -- 24 @ 18:35  Phos: -- 24 @ 18:35    LFT's: 24 @ 18:35  AST: 117  ALT: 42  ALP: 410  T.Bili: 17.6  D.Bili: --    __________________________________________________________________
TEAM [ E ] Surgery Daily Progress Note  =====================================================    SUBJECTIVE: Patient seen and examined at bedside on AM rounds. Patient is c/o unappetizing diet he was placed on outpatient. NPO, denies nausea, vomiting. Pt reports continued itchiness. OOB/Amublating as tolerated. Reports full body chills yesterday.    ALLERGIES:  No Known Allergies    --------------------------------------------------------------------------------------    MEDICATIONS:    Neurologic Medications  hydrOXYzine hydrochloride 25 milliGRAM(s) Oral every 8 hours    Respiratory Medications    Cardiovascular Medications    Gastrointestinal Medications  famotidine    Tablet 20 milliGRAM(s) Oral daily  lactated ringers. 1000 milliLiter(s) IV Continuous <Continuous>  ursodiol Tablet 250 milliGRAM(s) Oral two times a day    Genitourinary Medications    Hematologic/Oncologic Medications  heparin   Injectable 5000 Unit(s) SubCutaneous every 8 hours    Antimicrobial/Immunologic Medications  piperacillin/tazobactam IVPB.. 3.375 Gram(s) IV Intermittent every 8 hours    Endocrine/Metabolic Medications    Topical/Other Medications    --------------------------------------------------------------------------------------    VITAL SIGNS:  T(C): 36.4 (06-07-24 @ 04:05), Max: 38.4 (06-06-24 @ 18:33)  HR: 73 (06-07-24 @ 04:05) (73 - 103)  BP: 125/64 (06-07-24 @ 04:05) (95/67 - 125/64)  RR: 18 (06-07-24 @ 04:05) (16 - 20)  SpO2: 100% (06-07-24 @ 04:05) (96% - 100%)  --------------------------------------------------------------------------------------    EXAM    General: NAD, resting in bed comfortably. Pt scratching his arms/neck. Scleral icterus.  Cardiac: regular rate, warm and well perfused  Respiratory: Nonlabored respirations, normal cw expansion.  Abdomen: soft, nontender, nondistended.  Extremities: normal strength, FROM, no deformities    --------------------------------------------------------------------------------------    LABS                        10.2   10.06 )-----------( 213      ( 06 Jun 2024 18:35 )             29.6   06-06    130<L>  |  98  |  21  ----------------------------<  131<H>  4.6   |  16<L>  |  1.46<H>    Ca    8.4      06 Jun 2024 18:35    TPro  6.6  /  Alb  2.3<L>  /  TBili  17.6<H>  /  DBili  x   /  AST  117<H>  /  ALT  42<H>  /  AlkPhos  410<H>  06-06    --------------------------------------------------------------------------------------    INS AND OUTS:    06-06-24 @ 07:01  -  06-07-24 @ 07:00  --------------------------------------------------------  IN: 1080 mL / OUT: 260 mL / NET: 820 mL      --------------------------------------------------------------------------------------
Vital Signs Last 24 Hrs  T(C): 36.7 (09 Jun 2024 09:00), Max: 36.9 (08 Jun 2024 16:50)  T(F): 98 (09 Jun 2024 09:00), Max: 98.5 (08 Jun 2024 16:50)  HR: 87 (09 Jun 2024 09:00) (76 - 88)  BP: 120/71 (09 Jun 2024 09:00) (106/50 - 120/71)  BP(mean): --  RR: 17 (09 Jun 2024 09:00) (16 - 18)  SpO2: 100% (09 Jun 2024 09:00) (100% - 100%)    Parameters below as of 09 Jun 2024 09:00  Patient On (Oxygen Delivery Method): room air    SUBJECTIVE: Pt seen and examined bedside on morning rounds. Patient states that he has had multiple soft BMs and would like to stop taking a bowel regimen. Denies any fever, chills, n/v/d, SOB, or chest pain.     General: NAD, resting in bed comfortably. Pt scratching his arms/neck. Scleral icterus.  Cardiac: regular rate, warm and well perfused  Respiratory: Nonlabored respirations, normal cw expansion.  Abdomen: soft, nontender, nondistended.  Extremities: normal strength, FROM, no deformities    I&O's Summary    08 Jun 2024 07:01  -  09 Jun 2024 07:00  --------------------------------------------------------  IN: 1320 mL / OUT: 400 mL / NET: 920 mL      I&O's Detail    08 Jun 2024 07:01  -  09 Jun 2024 07:00  --------------------------------------------------------  IN:    sodium chloride 0.9%: 1320 mL  Total IN: 1320 mL    OUT:    Voided (mL): 400 mL  Total OUT: 400 mL    Total NET: 920 mL      MEDICATIONS  (STANDING):  dextrose 10% Bolus 125 milliLiter(s) IV Bolus once  dextrose 5%. 1000 milliLiter(s) (100 mL/Hr) IV Continuous <Continuous>  dextrose 5%. 1000 milliLiter(s) (50 mL/Hr) IV Continuous <Continuous>  dextrose 50% Injectable 25 Gram(s) IV Push once  dextrose 50% Injectable 12.5 Gram(s) IV Push once  famotidine    Tablet 20 milliGRAM(s) Oral daily  glucagon  Injectable 1 milliGRAM(s) IntraMuscular once  heparin   Injectable 5000 Unit(s) SubCutaneous every 8 hours  hydrOXYzine hydrochloride 25 milliGRAM(s) Oral every 8 hours  insulin lispro (ADMELOG) corrective regimen sliding scale   SubCutaneous three times a day before meals  insulin lispro (ADMELOG) corrective regimen sliding scale   SubCutaneous at bedtime  piperacillin/tazobactam IVPB.. 3.375 Gram(s) IV Intermittent every 8 hours  ursodiol Tablet 250 milliGRAM(s) Oral two times a day    MEDICATIONS  (PRN):  dextrose Oral Gel 15 Gram(s) Oral once PRN Blood Glucose LESS THAN 70 milliGRAM(s)/deciliter      LABS:                        9.0    8.01  )-----------( 184      ( 09 Jun 2024 05:03 )             26.5     06-09    133<L>  |  104  |  11  ----------------------------<  120<H>  4.0   |  16<L>  |  1.38<H>    Ca    7.6<L>      09 Jun 2024 05:03  Phos  1.8     06-09  Mg     2.00     06-09    TPro  5.9<L>  /  Alb  2.0<L>  /  TBili  11.5<H>  /  DBili  x   /  AST  84<H>  /  ALT  34  /  AlkPhos  315<H>  06-09  
                                                                                         E Team surgery    no acute events overnight; pt is currently tolerating PO diet; pt is passing gas/ BM     OBJECTIVE:  Vital Signs Last 24 Hrs  T(C): 36.7 (10 Memo 2024 04:44), Max: 36.7 (09 Jun 2024 09:00)  T(F): 98.1 (10 Memo 2024 04:44), Max: 98.1 (10 Memo 2024 04:44)  HR: 74 (10 Memo 2024 04:44) (73 - 87)  BP: 113/55 (10 Memo 2024 04:44) (109/57 - 122/72)  BP(mean): --  RR: 18 (10 Memo 2024 04:44) (16 - 18)  SpO2: 100% (10 Memo 2024 04:44) (100% - 100%)    Parameters below as of 10 Memo 2024 04:44  Patient On (Oxygen Delivery Method): room air      I&O's Summary    09 Jun 2024 07:01  -  10 Memo 2024 07:00  --------------------------------------------------------  IN: 480 mL / OUT: 550 mL / NET: -70 mL      I&O's Detail    09 Jun 2024 07:01  -  10 Memo 2024 07:00  --------------------------------------------------------  IN:    Oral Fluid: 480 mL  Total IN: 480 mL    OUT:    Voided (mL): 550 mL  Total OUT: 550 mL    Total NET: -70 mL      General: NAD, resting in bed comfortably. Pt scratching his arms/neck. Scleral icterus.  Cardiac: regular rate, warm and well perfused  Respiratory: Nonlabored respirations, normal cw expansion.  Abdomen: soft, nontender, nondistended.  Extremities: normal strength, FROM, no deformities    MEDICATIONS  (STANDING):  dextrose 10% Bolus 125 milliLiter(s) IV Bolus once  dextrose 5%. 1000 milliLiter(s) (100 mL/Hr) IV Continuous <Continuous>  dextrose 5%. 1000 milliLiter(s) (50 mL/Hr) IV Continuous <Continuous>  dextrose 50% Injectable 25 Gram(s) IV Push once  dextrose 50% Injectable 12.5 Gram(s) IV Push once  famotidine    Tablet 20 milliGRAM(s) Oral daily  glucagon  Injectable 1 milliGRAM(s) IntraMuscular once  heparin   Injectable 5000 Unit(s) SubCutaneous every 8 hours  hydrOXYzine hydrochloride 25 milliGRAM(s) Oral every 8 hours  insulin lispro (ADMELOG) corrective regimen sliding scale   SubCutaneous three times a day before meals  insulin lispro (ADMELOG) corrective regimen sliding scale   SubCutaneous at bedtime  ursodiol Tablet 250 milliGRAM(s) Oral two times a day    MEDICATIONS  (PRN):  dextrose Oral Gel 15 Gram(s) Oral once PRN Blood Glucose LESS THAN 70 milliGRAM(s)/deciliter  polyethylene glycol 3350 17 Gram(s) Oral daily PRN Constipation      LABS:                        9.0    7.80  )-----------( 198      ( 10 Memo 2024 05:36 )             27.0     06-09    133<L>  |  104  |  11  ----------------------------<  120<H>  4.0   |  16<L>  |  1.38<H>    Ca    7.6<L>      09 Jun 2024 05:03  Phos  1.8     06-09  Mg     2.00     06-09    TPro  5.9<L>  /  Alb  2.0<L>  /  TBili  11.5<H>  /  DBili  x   /  AST  84<H>  /  ALT  34  /  AlkPhos  315<H>  06-09    A/P  63 year old male w/ a PMH includes HTN, HLD, type 2 DM (on insulin), hyperthyroidism, asthma, GERD, and RAMANA (not on CPAP). PSH of open cholecystectomy (15 yrs ago), appendectomy in (1975) and known Pancreatic head neoplasm (s/p repeat ERCP and metal stent placement on 5/29/24) presented to surg oncology clinic (Dr. Jose Angel Guevara) today w/ chills and weakness since discharge last Friday (5/31).     Recommendations:  - Regular diet  - dvt ppx  - continue home antihistamines for pruritis  - palliative c/s for symptomatic management  - miralax prn   - D/C home     E-Team, [id number]

## 2024-06-10 NOTE — CONSULT NOTE ADULT - PROBLEM SELECTOR RECOMMENDATION 9
- Diagnosed last month 5/2024   - Current sites of disease- PANCREAS: No significant change in heterogeneous mass in the head, measuring 2.6 x 4.0 cm, with a focus of calcification, from recent MRI. Similar upstream pancreatic duct dilatation and pancreatic atrophy. No obvious mets   - Treatment naive, pending outpatient f/u at [facility]   - Follows with Dr. Guevara

## 2024-06-10 NOTE — HISTORY OF PRESENT ILLNESS
[de-identified] : 05/17/2024, CT-Scan of Abdomen/pelvis: No pulmonary embolism. Pancreatic head/uncinate process mass with upstream pancreatic and biliary ductal dilatation, suspicious for malignancy. Primary consideration is pancreatic adenocarcinoma. Mild urinary bladder wall thickening, which may be on the basis of cystitis or underdistention. Correlate with urinalysis.  05/21/2024, ERCP:  Biliary stricture s/p sphincterotomy, brushing, and stent placement (10 mm x 60 mm FC-SEMS.  Endosonographic finding notable for a mass in the uncinate process of the pancreas s/p FNB x3. Z-line irregular s/p biopsy. Erythematous mucosa in the stomach s/p biopsy. 05/21/2024, Surgical Pathology Report: 1. Stomach, biopsy- Gastric antral and oxyntic mucosa with no significant diagnostic alterations.  No morphologic evidence of Helicobacter microorganisms.  2. GE junction, biopsy- Mild chronic nonspecific esophagitis. Negative for intraepithelial eosinophilia. Focal detached glandular epithelium and submucosal glands present, see note.  3. Pancreatic head and uncinate, fine needle biopsy - Invasive moderately differentiated adenocarcinoma. Part 2:  Deeper levels are in progress for further evaluation; an addendum. Part 2:  Deeper levels reveal more cardia-type glandular mucosa, negative for intestinal metaplasia. There is no change in the diagnosis.  05/24/2024, US Abdomen: Biliary stent extending from level superior pancreatic head to the second duodenum with pneumobilia..   05/27/2024, MRI/MRCP: Pancreatic head neoplasm. Severe intrahepatic and extrahepatic biliary ductal dilatation. Common bile duct tapers in the region of the pancreatic mass and contains layering debris. Distal common bile duct enhancement, possibly postprocedural.

## 2024-06-10 NOTE — DISCHARGE NOTE PROVIDER - NSDCFUSCHEDAPPT_GEN_ALL_CORE_FT
[facility]  [facility] [address] [facility] [facility]  Scheduled Appointment: 07/08/2024

## 2024-06-10 NOTE — DISCHARGE NOTE PROVIDER - DETAILS OF MALNUTRITION DIAGNOSIS/DIAGNOSES
This patient has been assessed with a concern for Malnutrition and was treated during this hospitalization for the following Nutrition diagnosis/diagnoses:     -  06/07/2024: Moderate protein-calorie malnutrition

## 2024-06-10 NOTE — CONSULT NOTE ADULT - CONVERSATION DETAILS
Palliative consulted for complex medical decision making in the setting of serious illness.   Had lengthy discussion with patient.   Patient recently diagnosed with pancreatic cancer. Patient reviewed recent admissions. He wants to get stronger and is working with his doctors to learn about treatment options. He does not know plan at this moment. He has much trust in his PCP Dr. Mcclellan who is helping him navigate this new cancer course.     Explored patient’s values and goals. He has been reading about pancreatic cancer and knows it has poor prognosis.   Patient is [employer] and very faithful. He shared much of his life and family experiences. He is grateful for the life he has had. Quality of life is important to him, he does not want to have a debilitated, bedbound existence. When he approaches end of life he wants to be comfortable, does not want to suffer.     Discussed Advanced Directives such as MOLST and HCP. Patient already signed HCP- his daughter Jacki who is the most rational and assertive child. Discussed code status. Patient is waiting to speak to his doctors about treatment plan before making any further decisions. He would like interventions that will help him, but if he worsens open to discussing deescalation of care. He says his children already know his wishes. Palliative consulted for complex medical decision making in the setting of serious illness.   Patient recently diagnosed with pancreatic cancer. Patient reviewed recent admissions. He wants to get stronger and is working with his doctors to learn about treatment options. He does not know plan at this moment. He has much trust in his PCP Dr. Mcclellan who is helping him navigate this new cancer course.     Explored patient’s values and goals. He has been reading about pancreatic cancer and knows it has poor prognosis.   Patient is [employer] and very faithful. He shared much of his life and family experiences. He is grateful for the life he has had. Quality of life is important to him, he does not want to have a debilitated, bedbound existence. When he approaches end of life he wants to be comfortable, does not want to suffer.     Discussed Advanced Directives such as MOLST and HCP. Patient already signed HCP- his daughter Jacki who is the most rational and assertive child. Discussed code status. Patient is waiting to speak to his doctors about treatment plan before making any further decisions. He would like interventions that will help him, but if he worsens open to discussing deescalation of care. He says his children already know his wishes.

## 2024-06-10 NOTE — DISCHARGE NOTE PROVIDER - NSDCMRMEDTOKEN_GEN_ALL_CORE_FT
alcohol swabs: Apply topically to affected area 4 times a day  famotidine 40 mg oral tablet: 1 tab(s) orally once a day  Freestyle Cherrie 2 [place]: Dispense 1 [place]  Freestyle Cherrie 3 [place]: Dispense 1 [place]  Freestyle Cherrie 3 Sensors: Please change every 14 days  hydrOXYzine hydrochloride 25 mg oral tablet: 1 tab(s) orally every 8 hours x 14 days as needed for Itching  Insulin Pen Needles, 4mm: 1 application subcutaneously 4 times a day. ** Use with insulin pen **  lancets: 1 application subcutaneously 4 times a day  polyethylene glycol 3350 oral powder for reconstitution: 17 gram(s) orally once a day as needed for Constipation  test strips (per patient&#x27;s insurance): 1 application subcutaneously 4 times a day. ** Compatible with patient&#x27;s glucometer **  ursodiol 250 mg oral tablet: 1 tab(s) orally 2 times a day

## 2024-06-10 NOTE — CONSULT NOTE ADULT - ASSESSMENT
Patient is a 63 year old male w/ a PMH includes HTN, HLD, type 2 DM (on insulin), hyperthyroidism, asthma, GERD, and RAMANA (not on CPAP). PSH of open cholecystectomy (15 yrs ago), appendectomy in (1975) and pancreatic cancer (s/p repeat ERCP and metal stent placement on 5/29/24) presented to surg oncology clinic (Dr. Jose Angel Guevara) today w/ chills and weakness since discharge last Friday (5/31). Patient was visibly shivering in the office so Dr. Guevara sent the patient in to the ED for a cholangitis/pancreatitis w/u.

## 2024-06-10 NOTE — DISCHARGE NOTE NURSING/CASE MANAGEMENT/SOCIAL WORK - PATIENT PORTAL LINK FT
You can access the FollowMyHealth Patient Portal offered by [facility] by registering at the following website: [email or web address]. By joining [facility]’s FollowMyHealth portal, you will also be able to view your health information using other applications (apps) compatible with our system.

## 2024-06-10 NOTE — DISCHARGE NOTE PROVIDER - CARE PROVIDER_API CALL
Jose Angel Guevara-Yeou  Surgery  [address] [place] [id number]  Phone: [phone]  Fax: [phone]  Follow Up Time: 1 week

## 2024-06-10 NOTE — DISCHARGE NOTE PROVIDER - HOSPITAL COURSE
63 year old male w/ a PMHx of HTN, HLD, type 2 DM (on insulin), hyperthyroidism, asthma, GERD, and RAMANA (not on CPAP). PSHx of open cholecystectomy (15 yrs ago), appendectomy in (1975) and pancreatic cancer (s/p repeat ERCP and metal stent placement on 5/29/24) presented to surg oncology clinic (Dr. Jose Angel Guevara) w/ chills and weakness since discharge last Friday (5/31). Patient was visibly shivering in the office so Dr. Guevara sent the patient in to the ED for a cholangitis/pancreatitis w/u. Patient was started on iv abx. CTAP showed biliary stent in place with pneumobilia. No biliary ductal dilatation. No significant change in known pancreatic head mass since recent MRI. No evidence of pancreatitis. GI was consulted with no indication for ERCP at this time. Fever work up came back negative. At this time, patient is currently ambulating, voiding, tolerating a regular diet. Patient has been deemed stable for discharge home with follow up as an outpatient.

## 2024-06-10 NOTE — DISCHARGE NOTE PROVIDER - DISCHARGE SERVICE FOR PATIENT
Patients daughter calling, patient was in [facility] and broke her femur. Pt is flying home today and daughter is concerned as to what condition the patient will be in. Pt will have copies of xrays, an Rx for pain meds. Pt had some pins placed. Daughter not sure if she should take patient to er when she arrives to get checked over. on the discharge service for the patient. I have reviewed and made amendments to the documentation where necessary.

## 2024-06-10 NOTE — CONSULT NOTE ADULT - PROBLEM SELECTOR RECOMMENDATION 3
For GOC and symptom management- Patient planned for discharge today. He will f/u out oncology outpatient to learn about treatment options. For diet/taste issues, recommend outpatient nutrition f/u.     Palliative signing off

## 2024-06-10 NOTE — DISCHARGE NOTE PROVIDER - NSDCCPCAREPLAN_GEN_ALL_CORE_FT
PRINCIPAL DISCHARGE DIAGNOSIS  Diagnosis: AMS (altered mental status)  Assessment and Plan of Treatment: BATHING: You may shower and/or sponge bathe.  ACTIVITY: You may return to your usual level of physical activity.   DIET: Return to your usual diet.  NOTIFY YOUR SURGEON IF: You have any fever (over 100.4 F) or chills, persistent nausea/vomiting, persistent diarrhea, or if your pain is not controlled on your discharge pain medications.  FOLLOW-UP: Please follow up with your primary care physician in one week regarding your hospitalization   Please follow up with Dr. Guevara within 1 week of discharge.

## 2024-06-10 NOTE — CONSULT NOTE ADULT - SUBJECTIVE AND OBJECTIVE BOX
HPI:  Patient is a 63 year old male w/ a PMH includes HTN, HLD, type 2 DM (on insulin), hyperthyroidism, asthma, GERD, and RAMNAA (not on CPAP). PSH of open cholecystectomy (15 yrs ago), appendectomy in (1975) and pancreatic cancer (s/p repeat ERCP and metal stent placement on 5/29/24) presented to surg oncology clinic (Dr. Jose Angel Guevara) today w/ chills and weakness since discharge last Friday (5/31). Patient was visibly shivering in the office so Dr. Guevara sent the patient in to the ED for a cholangitis/pancreatitis w/u.    Patient seen this AM, comfortable in no distress. Patient says he feels much improved from prior. Before admission he had been feeling weak. He shares his very strong disdain for the hospital food/diet. He has been having taste issues before admission and now hospital food making it worse. He does have an appetite. He is eating more fruits and likes a chocolate drink. He says this happened to him once before 4 years ago when he had hyperthyroidism. He denies other symptoms such as pain. He is looking forward to going home soon.     PERTINENT PM/SXH:   Asthma    HTN (hypertension)    GERD (gastroesophageal reflux disease)    Sleep apnea    Hyperlipidemia    Type 2 diabetes mellitus    History of cholecystectomy    Hyperthyroidism      History of appendectomy    FAMILY HISTORY:  Family history of hyperthyroidism (Child)    ------------------------------------------------------------------------------------------------------------  ITEMS NOT CHECKED ARE NOT PRESENT    SOCIAL HISTORY:   Living Situation: [X ]Home  [ ]Long term care  [ ]Rehab [ ]Other  Support: Lives with daughter Jacki, has two other sons;  from [place]     Substance hx:  [ ]   Tobacco hx:  [ ]   Alcohol hx: [ ]   Family Hx substance abuse [ ]yes [ ]no    Hinduism/Spirituality: Roman Catholic   PCSSQ[Palliative Care Spiritual Screening Question]   Severity (0-10): 0  Score of 4 or > indicate consideration of Chaplaincy referral.  Chaplaincy Referral: [ ] yes [X ] refused [ ] following    Anticipatory Grief present?:  [ ] yes [X ] no  [ ] Deferred                      Other Referrals:    [ ]Hospice   [ ]Caregiver [facility]  [ ]Child Life    [ ]Patient & Family Centered Care Referral  [ ]Holistic Therapy     ------------------------------------------------------------------------------------------------------------    PRESENT SYMPTOMS:     [ ] Unable to self-report      [ ] PAINADS     [ ] RDOS    [X ] No     [ ] Yes     Source if other than patient:  [ ]Family   [ ]Team     PAIN:   If blank, patient unable to specify     [ ]yes [ ]no    1. Location-   2. Radiation-    3. Quality-   4. Timing-   5. Minimal acceptable level/pain goal-   6. Aggravating factors-   7. QOL impact-     SYMPTOMS:   Dyspnea:                           [ ]Mild [ ]Moderate [ ]Severe  Anxiety:                             [ ]Mild [ ]Moderate [ ]Severe  Fatigue:                             [ ]Mild [ ]Moderate [ ]Severe  Nausea/Vomiting:              [ ]Mild [ ]Moderate [ ]Severe  Loss of appetite:                [ ]Mild [ ]Moderate [ ]Severe  Constipation:                     [ ]Mild [ ]Moderate [ ]Severe    Other Symptoms:  [X ]All other review of systems negative     ------------------------------------------------------------------------------------------------------------    [employer] Reference No: [id number]    ------------------------------------------------------------------------------------------------------------    FUNCTIONAL STATUS:     Baseline ADL (prior to admission):  [X ] Independent  [ ] Moderate Assistance [ ] Dependent    Palliative Performance Score (current): 60%     [ ]PPSV2 < or = to 30%   [ ]artificial nutrition      NUTRITIONAL STATUS:     Protein Calorie Malnutrition Present:   [ ]mild   [ ]moderate or severe    Weight:   [ ]underweight (BMI 18.5 or less)   [ ]morbid obesity (BMI 30 or higher)   [ ]anasarca  [ ]significant weight loss     Height (cm): 177.8 (06-06-24 @ 16:40), 177.8 (05-29-24 @ 09:00)  Weight (kg): 96 (06-06-24 @ 16:40), 99.79 (05-29-24 @ 09:00)  BMI (kg/m2): 30.4 (06-06-24 @ 16:40), 31.6 (05-29-24 @ 09:00)    ------------------------------------------------------------------------------------------------------------    PRIOR ADVANCE DIRECTIVES:    [ ] DNR/MOLST    [ ] Living Will    [X ] Health Care Proxy(s)- daughter Jacki Brooks primary, son Jovannyrashaad Brooks secondary     DECISION MAKER(s):  [ ] Patient    [ ] Surrogate(s)  [ ] HCP   [ ] Guardian             ------------------------------------------------------------------------------------------------------------  PHYSICAL EXAM:  Vital Signs Last 24 Hrs  T(C): 36.3 (10 Memo 2024 08:00), Max: 36.7 (10 Memo 2024 04:44)  T(F): 97.3 (10 Memo 2024 08:00), Max: 98.1 (10 Memo 2024 04:44)  HR: 86 (10 Memo 2024 08:00) (73 - 86)  BP: 110/62 (10 Memo 2024 08:00) (109/57 - 122/72)  BP(mean): --  RR: 17 (10 Memo 2024 08:00) (16 - 18)  SpO2: 100% (10 Memo 2024 08:00) (100% - 100%)    Parameters below as of 10 Memo 2024 08:00  Patient On (Oxygen Delivery Method): room air     I&O's Summary    09 Jun 2024 07:01  -  10 Memo 2024 07:00  --------------------------------------------------------  IN: 480 mL / OUT: 550 mL / NET: -70 mL    10 Memo 2024 07:01  -  10 Memo 2024 11:22  --------------------------------------------------------  IN: 240 mL / OUT: 150 mL / NET: 90 mL      GENERAL:  [ ]Cachexia  [ ] Frail  [X ]Awake  [X ]Oriented x 3  [ ]Lethargic  [ ]Unarousable  [ ]Verbal  [ ]Non-Verbal    BEHAVIORAL:   [ ] Anxiety  [ ] Delirium [ ] Agitation [ ] Other    HEENT:   [ ]Normal   [ ]Dry mouth   [ ]ET Tube/Trach  [ ]Oral lesions  icteric sclera     PULMONARY:   [X ]Clear              [ ]Tachypnea  [ ]Audible excessive secretions   [ ]Rhonchi        [ ]Right [ ]Left [ ]Bilateral  [ ]Crackles        [ ]Right [ ]Left [ ]Bilateral  [ ]Wheezing     [ ]Right [ ]Left [ ]Bilateral  [ ]Diminished breath sounds [ ]right [ ]left [ ]bilateral    CARDIOVASCULAR:    [X ]Regular [ ]Irregular [ ]Tachy  [ ]Travis [ ]Murmur [ ]Other    GASTROINTESTINAL:  [X ]Soft  [ ]Distended   [ ]+BS  [X ]Non tender [ ]Tender  [ ]Other [ ]PEG [ ]OGT/ NGT      GENITOURINARY:  [X ]Normal [ ] Incontinent   [ ]Oliguria/Anuria   [ ]Pedersen    MUSCULOSKELETAL:   [X ]Normal   [ ]Weakness  [ ]Bed/Wheelchair bound [ ]Edema    NEUROLOGIC:   [X ]No focal deficits  [ ]Cognitive impairment  [ ]Dysphagia [ ]Dysarthria [ ]Paresis [ ]Other     SKIN:   [X ]Normal  [ ]Rash  [ ]Other  [ ]Pressure ulcer(s)       Present on admission [ ]y [ ]n    ------------------------------------------------------------------------------------------------------------    LABS:                        9.0    7.80  )-----------( 198      ( 10 Memo 2024 05:36 )             27.0   06-10    133<L>  |  102  |  10  ----------------------------<  83  3.4<L>   |  17<L>  |  1.27    Ca    7.5<L>      10 Memo 2024 05:36  Phos  2.3     06-10  Mg     2.00     06-10    TPro  6.0  /  Alb  2.3<L>  /  TBili  10.5<H>  /  DBili  x   /  AST  72<H>  /  ALT  36  /  AlkPhos  327<H>  06-10      Urinalysis Basic - ( 10 Memo 2024 05:36 )    Color: x / Appearance: x / SG: x / pH: x  Gluc: 83 mg/dL / Ketone: x  / Bili: x / Urobili: x   Blood: x / Protein: x / Nitrite: x   Leuk Esterase: x / RBC: x / WBC x   Sq Epi: x / Non Sq Epi: x / Bacteria: x    ------------------------------------------------------------------------------------------------------------    CRITICAL CARE:  [ ]Shock Present  [ ]Septic [ ]Cardiogenic [ ]Neurologic [ ]Hypovolemic [ ] Undifferentiated/Mixed   [ ]Vasopressors [ ]Inotropes     [ ]Respiratory failure present: [ ]Acute  [ ]Chronic [ ]Hypoxic  [ ]Hypercarbic   [ ]Mechanical ventilation   [ ]Trach collar   [ ]Non-invasive ventilatory support   [ ]High flow    [ ]Non-rebreather/Venti     [ ]Other organ failure     ------------------------------------------------------------------------------------------------------------    RADIOLOGY & ADDITIONAL STUDIES:    < from: CT Abdomen and Pelvis w/ IV Cont (06.06.24 @ 19:38) >  IMPRESSION:  Biliary stent in place with expected pneumobilia. No biliary ductal   dilatation.  No significant change in known pancreatic head mass since recent MRI. No   evidence of pancreatitis.    < end of copied text >    ------------------------------------------------------------------------------------------------------------    ALLERGIES:  Allergies    No Known Allergies    Intolerances    MEDICATIONS  (STANDING):  dextrose 10% Bolus 125 milliLiter(s) IV Bolus once  dextrose 5%. 1000 milliLiter(s) (100 mL/Hr) IV Continuous <Continuous>  dextrose 5%. 1000 milliLiter(s) (50 mL/Hr) IV Continuous <Continuous>  dextrose 50% Injectable 25 Gram(s) IV Push once  dextrose 50% Injectable 12.5 Gram(s) IV Push once  famotidine    Tablet 20 milliGRAM(s) Oral daily  glucagon  Injectable 1 milliGRAM(s) IntraMuscular once  heparin   Injectable 5000 Unit(s) SubCutaneous every 8 hours  hydrOXYzine hydrochloride 25 milliGRAM(s) Oral every 8 hours  insulin lispro (ADMELOG) corrective regimen sliding scale   SubCutaneous three times a day before meals  insulin lispro (ADMELOG) corrective regimen sliding scale   SubCutaneous at bedtime  sodium phosphate 30 milliMole(s)/500 mL IVPB 30 milliMole(s) IV Intermittent once  ursodiol Tablet 250 milliGRAM(s) Oral two times a day    MEDICATIONS  (PRN):  dextrose Oral Gel 15 Gram(s) Oral once PRN Blood Glucose LESS THAN 70 milliGRAM(s)/deciliter  polyethylene glycol 3350 17 Gram(s) Oral daily PRN Constipation

## 2024-06-11 LAB
CULTURE RESULTS: SIGNIFICANT CHANGE UP
SPECIMEN SOURCE: SIGNIFICANT CHANGE UP

## 2024-06-25 ENCOUNTER — APPOINTMENT (OUTPATIENT)
Dept: MULTI SPECIALTY CLINIC | Facility: CLINIC | Age: 64
End: 2024-06-25

## 2024-06-27 ENCOUNTER — INPATIENT (INPATIENT)
Facility: HOSPITAL | Age: 64
LOS: 3 days | Discharge: HOME | End: 2024-07-01
Attending: INTERNAL MEDICINE | Admitting: INTERNAL MEDICINE
Payer: MEDICAID

## 2024-06-27 VITALS
DIASTOLIC BLOOD PRESSURE: 70 MMHG | HEIGHT: 70 IN | HEART RATE: 112 BPM | OXYGEN SATURATION: 97 % | RESPIRATION RATE: 18 BRPM | TEMPERATURE: 99 F | SYSTOLIC BLOOD PRESSURE: 103 MMHG | WEIGHT: 214.07 LBS

## 2024-06-27 DIAGNOSIS — Z90.49 ACQUIRED ABSENCE OF OTHER SPECIFIED PARTS OF DIGESTIVE TRACT: Chronic | ICD-10-CM

## 2024-06-27 PROCEDURE — 99285 EMERGENCY DEPT VISIT HI MDM: CPT

## 2024-06-28 DIAGNOSIS — A41.9 SEPSIS, UNSPECIFIED ORGANISM: ICD-10-CM

## 2024-06-28 DIAGNOSIS — C25.9 MALIGNANT NEOPLASM OF PANCREAS, UNSPECIFIED: ICD-10-CM

## 2024-06-28 DIAGNOSIS — Z29.9 ENCOUNTER FOR PROPHYLACTIC MEASURES, UNSPECIFIED: ICD-10-CM

## 2024-06-28 DIAGNOSIS — E11.9 TYPE 2 DIABETES MELLITUS WITHOUT COMPLICATIONS: ICD-10-CM

## 2024-06-28 DIAGNOSIS — N39.0 URINARY TRACT INFECTION, SITE NOT SPECIFIED: ICD-10-CM

## 2024-06-28 DIAGNOSIS — R50.9 FEVER, UNSPECIFIED: ICD-10-CM

## 2024-06-28 LAB
ADD ON TEST-SPECIMEN IN LAB: SIGNIFICANT CHANGE UP
ADD ON TEST-SPECIMEN IN LAB: SIGNIFICANT CHANGE UP
ALBUMIN SERPL ELPH-MCNC: 2.9 G/DL — LOW (ref 3.3–5)
ALP SERPL-CCNC: 291 U/L — HIGH (ref 40–120)
ALT FLD-CCNC: 26 U/L — SIGNIFICANT CHANGE UP (ref 4–41)
AMMONIA BLD-MCNC: 19 UMOL/L — SIGNIFICANT CHANGE UP (ref 11–55)
ANION GAP SERPL CALC-SCNC: 14 MMOL/L — SIGNIFICANT CHANGE UP (ref 7–14)
APPEARANCE UR: CLEAR — SIGNIFICANT CHANGE UP
APTT BLD: 39.2 SEC — HIGH (ref 24.5–35.6)
AST SERPL-CCNC: 46 U/L — HIGH (ref 4–40)
BASOPHILS # BLD AUTO: 0.03 K/UL — SIGNIFICANT CHANGE UP (ref 0–0.2)
BASOPHILS NFR BLD AUTO: 0.3 % — SIGNIFICANT CHANGE UP (ref 0–2)
BILIRUB DIRECT SERPL-MCNC: 2.1 MG/DL — HIGH (ref 0–0.3)
BILIRUB SERPL-MCNC: 3.5 MG/DL — HIGH (ref 0.2–1.2)
BILIRUB UR-MCNC: ABNORMAL
BLOOD GAS VENOUS COMPREHENSIVE RESULT: SIGNIFICANT CHANGE UP
BUN SERPL-MCNC: 12 MG/DL — SIGNIFICANT CHANGE UP (ref 7–23)
CALCIUM SERPL-MCNC: 8.7 MG/DL — SIGNIFICANT CHANGE UP (ref 8.4–10.5)
CHLORIDE SERPL-SCNC: 99 MMOL/L — SIGNIFICANT CHANGE UP (ref 98–107)
CO2 SERPL-SCNC: 21 MMOL/L — LOW (ref 22–31)
COLOR SPEC: SIGNIFICANT CHANGE UP
CREAT SERPL-MCNC: 1.2 MG/DL — SIGNIFICANT CHANGE UP (ref 0.5–1.3)
DIFF PNL FLD: ABNORMAL
EGFR: 68 ML/MIN/1.73M2 — SIGNIFICANT CHANGE UP
EOSINOPHIL # BLD AUTO: 0.11 K/UL — SIGNIFICANT CHANGE UP (ref 0–0.5)
EOSINOPHIL NFR BLD AUTO: 1.1 % — SIGNIFICANT CHANGE UP (ref 0–6)
FLUAV AG NPH QL: SIGNIFICANT CHANGE UP
FLUBV AG NPH QL: SIGNIFICANT CHANGE UP
GLUCOSE BLDC GLUCOMTR-MCNC: 163 MG/DL — HIGH (ref 70–99)
GLUCOSE BLDC GLUCOMTR-MCNC: 166 MG/DL — HIGH (ref 70–99)
GLUCOSE BLDC GLUCOMTR-MCNC: 171 MG/DL — HIGH (ref 70–99)
GLUCOSE BLDC GLUCOMTR-MCNC: 187 MG/DL — HIGH (ref 70–99)
GLUCOSE BLDC GLUCOMTR-MCNC: 188 MG/DL — HIGH (ref 70–99)
GLUCOSE BLDC GLUCOMTR-MCNC: 441 MG/DL — HIGH (ref 70–99)
GLUCOSE SERPL-MCNC: 398 MG/DL — HIGH (ref 70–99)
GLUCOSE UR QL: 500 MG/DL
HCT VFR BLD CALC: 28.5 % — LOW (ref 39–50)
HGB BLD-MCNC: 9.5 G/DL — LOW (ref 13–17)
IANC: 8.9 K/UL — HIGH (ref 1.8–7.4)
IMM GRANULOCYTES NFR BLD AUTO: 0.4 % — SIGNIFICANT CHANGE UP (ref 0–0.9)
INR BLD: 1.41 RATIO — HIGH (ref 0.85–1.18)
KETONES UR-MCNC: ABNORMAL MG/DL
LACTATE SERPL-SCNC: 2.3 MMOL/L — HIGH (ref 0.5–2)
LEUKOCYTE ESTERASE UR-ACNC: ABNORMAL
LYMPHOCYTES # BLD AUTO: 0.69 K/UL — LOW (ref 1–3.3)
LYMPHOCYTES # BLD AUTO: 6.7 % — LOW (ref 13–44)
MCHC RBC-ENTMCNC: 32.8 PG — SIGNIFICANT CHANGE UP (ref 27–34)
MCHC RBC-ENTMCNC: 33.3 GM/DL — SIGNIFICANT CHANGE UP (ref 32–36)
MCV RBC AUTO: 98.3 FL — SIGNIFICANT CHANGE UP (ref 80–100)
MONOCYTES # BLD AUTO: 0.49 K/UL — SIGNIFICANT CHANGE UP (ref 0–0.9)
MONOCYTES NFR BLD AUTO: 4.8 % — SIGNIFICANT CHANGE UP (ref 2–14)
NEUTROPHILS # BLD AUTO: 8.9 K/UL — HIGH (ref 1.8–7.4)
NEUTROPHILS NFR BLD AUTO: 86.7 % — HIGH (ref 43–77)
NITRITE UR-MCNC: POSITIVE
NRBC # BLD: 0 /100 WBCS — SIGNIFICANT CHANGE UP (ref 0–0)
NRBC # FLD: 0 K/UL — SIGNIFICANT CHANGE UP (ref 0–0)
PH UR: 5.5 — SIGNIFICANT CHANGE UP (ref 5–8)
PLATELET # BLD AUTO: 192 K/UL — SIGNIFICANT CHANGE UP (ref 150–400)
POTASSIUM SERPL-MCNC: 4.6 MMOL/L — SIGNIFICANT CHANGE UP (ref 3.5–5.3)
POTASSIUM SERPL-SCNC: 4.6 MMOL/L — SIGNIFICANT CHANGE UP (ref 3.5–5.3)
PROT SERPL-MCNC: 7.9 G/DL — SIGNIFICANT CHANGE UP (ref 6–8.3)
PROT UR-MCNC: 30 MG/DL
PROTHROM AB SERPL-ACNC: 15.8 SEC — HIGH (ref 9.5–13)
RBC # BLD: 2.9 M/UL — LOW (ref 4.2–5.8)
RBC # FLD: 15.2 % — HIGH (ref 10.3–14.5)
RSV RNA NPH QL NAA+NON-PROBE: SIGNIFICANT CHANGE UP
SARS-COV-2 RNA SPEC QL NAA+PROBE: SIGNIFICANT CHANGE UP
SODIUM SERPL-SCNC: 134 MMOL/L — LOW (ref 135–145)
SP GR SPEC: 1.06 — HIGH (ref 1–1.03)
TROPONIN T, HIGH SENSITIVITY RESULT: 16 NG/L — SIGNIFICANT CHANGE UP
UROBILINOGEN FLD QL: 1 MG/DL — SIGNIFICANT CHANGE UP (ref 0.2–1)
WBC # BLD: 10.26 K/UL — SIGNIFICANT CHANGE UP (ref 3.8–10.5)
WBC # FLD AUTO: 10.26 K/UL — SIGNIFICANT CHANGE UP (ref 3.8–10.5)

## 2024-06-28 PROCEDURE — 71275 CT ANGIOGRAPHY CHEST: CPT | Mod: 26,MC

## 2024-06-28 PROCEDURE — 99222 1ST HOSP IP/OBS MODERATE 55: CPT | Mod: GC

## 2024-06-28 PROCEDURE — 74177 CT ABD & PELVIS W/CONTRAST: CPT | Mod: 26,MC

## 2024-06-28 PROCEDURE — 99223 1ST HOSP IP/OBS HIGH 75: CPT

## 2024-06-28 PROCEDURE — 71045 X-RAY EXAM CHEST 1 VIEW: CPT | Mod: 26

## 2024-06-28 RX ORDER — URSODIOL 300 MG/1
250 CAPSULE ORAL
Refills: 0 | Status: DISCONTINUED | OUTPATIENT
Start: 2024-06-28 | End: 2024-07-01

## 2024-06-28 RX ORDER — DEXTROSE 30 % IN WATER 30 %
15 VIAL (ML) INTRAVENOUS ONCE
Refills: 0 | Status: DISCONTINUED | OUTPATIENT
Start: 2024-06-28 | End: 2024-07-01

## 2024-06-28 RX ORDER — DEXTROSE MONOHYDRATE 100 MG/ML
125 INJECTION, SOLUTION INTRAVENOUS ONCE
Refills: 0 | Status: DISCONTINUED | OUTPATIENT
Start: 2024-06-28 | End: 2024-07-01

## 2024-06-28 RX ORDER — KETOROLAC TROMETHAMINE 30 MG/ML
15 INJECTION, SOLUTION INTRAMUSCULAR ONCE
Refills: 0 | Status: DISCONTINUED | OUTPATIENT
Start: 2024-06-28 | End: 2024-06-28

## 2024-06-28 RX ORDER — SODIUM CHLORIDE 0.9 % (FLUSH) 0.9 %
1000 SYRINGE (ML) INJECTION
Refills: 0 | Status: DISCONTINUED | OUTPATIENT
Start: 2024-06-28 | End: 2024-06-29

## 2024-06-28 RX ORDER — INSULIN LISPRO 100 [IU]/ML
INJECTION, SOLUTION SUBCUTANEOUS AT BEDTIME
Refills: 0 | Status: DISCONTINUED | OUTPATIENT
Start: 2024-06-28 | End: 2024-07-01

## 2024-06-28 RX ORDER — ACETAMINOPHEN 325 MG
1000 TABLET ORAL ONCE
Refills: 0 | Status: COMPLETED | OUTPATIENT
Start: 2024-06-28 | End: 2024-06-28

## 2024-06-28 RX ORDER — VANCOMYCIN HYDROCHLORIDE 50 MG/ML
1000 KIT ORAL ONCE
Refills: 0 | Status: COMPLETED | OUTPATIENT
Start: 2024-06-28 | End: 2024-06-28

## 2024-06-28 RX ORDER — DEXTROSE 30 % IN WATER 30 %
25 VIAL (ML) INTRAVENOUS ONCE
Refills: 0 | Status: DISCONTINUED | OUTPATIENT
Start: 2024-06-28 | End: 2024-07-01

## 2024-06-28 RX ORDER — HYDROXYZINE PAMOATE 50 MG/1
25 CAPSULE ORAL EVERY 8 HOURS
Refills: 0 | Status: DISCONTINUED | OUTPATIENT
Start: 2024-06-28 | End: 2024-07-01

## 2024-06-28 RX ORDER — FAMOTIDINE 40 MG
40 TABLET ORAL DAILY
Refills: 0 | Status: DISCONTINUED | OUTPATIENT
Start: 2024-06-28 | End: 2024-07-01

## 2024-06-28 RX ORDER — ACETAMINOPHEN 325 MG
650 TABLET ORAL EVERY 6 HOURS
Refills: 0 | Status: DISCONTINUED | OUTPATIENT
Start: 2024-06-28 | End: 2024-07-01

## 2024-06-28 RX ORDER — PIPERACILLIN SODIUM AND TAZOBACTAM SODIUM 3; .375 G/15ML; G/15ML
3.38 INJECTION, POWDER, LYOPHILIZED, FOR SOLUTION INTRAVENOUS ONCE
Refills: 0 | Status: COMPLETED | OUTPATIENT
Start: 2024-06-28 | End: 2024-06-28

## 2024-06-28 RX ORDER — INSULIN LISPRO 100 [IU]/ML
INJECTION, SOLUTION SUBCUTANEOUS EVERY 6 HOURS
Refills: 0 | Status: DISCONTINUED | OUTPATIENT
Start: 2024-06-28 | End: 2024-06-28

## 2024-06-28 RX ORDER — DEXTROSE MONOHYDRATE AND SODIUM CHLORIDE 5; .3 G/100ML; G/100ML
1000 INJECTION, SOLUTION INTRAVENOUS
Refills: 0 | Status: DISCONTINUED | OUTPATIENT
Start: 2024-06-28 | End: 2024-07-01

## 2024-06-28 RX ORDER — INSULIN LISPRO 100 [IU]/ML
INJECTION, SOLUTION SUBCUTANEOUS
Refills: 0 | Status: DISCONTINUED | OUTPATIENT
Start: 2024-06-28 | End: 2024-07-01

## 2024-06-28 RX ORDER — SODIUM CHLORIDE 0.9 % (FLUSH) 0.9 %
300 SYRINGE (ML) INJECTION ONCE
Refills: 0 | Status: COMPLETED | OUTPATIENT
Start: 2024-06-28 | End: 2024-06-28

## 2024-06-28 RX ORDER — MORPHINE SULFATE 100 MG/1
2 TABLET, EXTENDED RELEASE ORAL EVERY 6 HOURS
Refills: 0 | Status: DISCONTINUED | OUTPATIENT
Start: 2024-06-28 | End: 2024-07-01

## 2024-06-28 RX ORDER — PIPERACILLIN SODIUM AND TAZOBACTAM SODIUM 3; .375 G/15ML; G/15ML
3.38 INJECTION, POWDER, LYOPHILIZED, FOR SOLUTION INTRAVENOUS EVERY 8 HOURS
Refills: 0 | Status: DISCONTINUED | OUTPATIENT
Start: 2024-06-28 | End: 2024-07-01

## 2024-06-28 RX ORDER — POLYETHYLENE GLYCOL 3350 1 G/G
17 POWDER ORAL DAILY
Refills: 0 | Status: DISCONTINUED | OUTPATIENT
Start: 2024-06-28 | End: 2024-07-01

## 2024-06-28 RX ORDER — ENOXAPARIN SODIUM 100 MG/ML
40 INJECTION SUBCUTANEOUS EVERY 24 HOURS
Refills: 0 | Status: DISCONTINUED | OUTPATIENT
Start: 2024-06-28 | End: 2024-07-01

## 2024-06-28 RX ORDER — DEXTROSE 30 % IN WATER 30 %
12.5 VIAL (ML) INTRAVENOUS ONCE
Refills: 0 | Status: DISCONTINUED | OUTPATIENT
Start: 2024-06-28 | End: 2024-07-01

## 2024-06-28 RX ORDER — VANCOMYCIN HYDROCHLORIDE 50 MG/ML
1000 KIT ORAL EVERY 12 HOURS
Refills: 0 | Status: DISCONTINUED | OUTPATIENT
Start: 2024-06-28 | End: 2024-06-30

## 2024-06-28 RX ORDER — GLUCAGON HYDROCHLORIDE 1 MG/ML
1 INJECTION, POWDER, FOR SOLUTION INTRAMUSCULAR; INTRAVENOUS; SUBCUTANEOUS ONCE
Refills: 0 | Status: DISCONTINUED | OUTPATIENT
Start: 2024-06-28 | End: 2024-07-01

## 2024-06-28 RX ORDER — SODIUM CHLORIDE 0.9 % (FLUSH) 0.9 %
2300 SYRINGE (ML) INJECTION ONCE
Refills: 0 | Status: COMPLETED | OUTPATIENT
Start: 2024-06-28 | End: 2024-06-28

## 2024-06-28 RX ORDER — INSULIN LISPRO 100 [IU]/ML
5 INJECTION, SOLUTION SUBCUTANEOUS ONCE
Refills: 0 | Status: COMPLETED | OUTPATIENT
Start: 2024-06-28 | End: 2024-06-28

## 2024-06-28 RX ADMIN — HYDROXYZINE PAMOATE 25 MILLIGRAM(S): 50 CAPSULE ORAL at 14:15

## 2024-06-28 RX ADMIN — PIPERACILLIN SODIUM AND TAZOBACTAM SODIUM 200 GRAM(S): 3; .375 INJECTION, POWDER, LYOPHILIZED, FOR SOLUTION INTRAVENOUS at 01:20

## 2024-06-28 RX ADMIN — ENOXAPARIN SODIUM 40 MILLIGRAM(S): 100 INJECTION SUBCUTANEOUS at 18:45

## 2024-06-28 RX ADMIN — KETOROLAC TROMETHAMINE 15 MILLIGRAM(S): 30 INJECTION, SOLUTION INTRAMUSCULAR at 04:17

## 2024-06-28 RX ADMIN — INSULIN LISPRO 1: 100 INJECTION, SOLUTION SUBCUTANEOUS at 17:24

## 2024-06-28 RX ADMIN — Medication 300 MILLILITER(S): at 14:13

## 2024-06-28 RX ADMIN — INSULIN LISPRO 1: 100 INJECTION, SOLUTION SUBCUTANEOUS at 13:23

## 2024-06-28 RX ADMIN — Medication 80 MILLILITER(S): at 15:24

## 2024-06-28 RX ADMIN — INSULIN LISPRO 1: 100 INJECTION, SOLUTION SUBCUTANEOUS at 08:38

## 2024-06-28 RX ADMIN — Medication 400 MILLIGRAM(S): at 01:00

## 2024-06-28 RX ADMIN — Medication 2300 MILLILITER(S): at 01:00

## 2024-06-28 RX ADMIN — PIPERACILLIN SODIUM AND TAZOBACTAM SODIUM 25 GRAM(S): 3; .375 INJECTION, POWDER, LYOPHILIZED, FOR SOLUTION INTRAVENOUS at 14:13

## 2024-06-28 RX ADMIN — HYDROXYZINE PAMOATE 25 MILLIGRAM(S): 50 CAPSULE ORAL at 22:12

## 2024-06-28 RX ADMIN — INSULIN LISPRO 5 UNIT(S): 100 INJECTION, SOLUTION SUBCUTANEOUS at 04:43

## 2024-06-28 RX ADMIN — PIPERACILLIN SODIUM AND TAZOBACTAM SODIUM 25 GRAM(S): 3; .375 INJECTION, POWDER, LYOPHILIZED, FOR SOLUTION INTRAVENOUS at 22:11

## 2024-06-28 RX ADMIN — VANCOMYCIN HYDROCHLORIDE 250 MILLIGRAM(S): KIT at 18:50

## 2024-06-28 RX ADMIN — VANCOMYCIN HYDROCHLORIDE 250 MILLIGRAM(S): KIT at 04:17

## 2024-06-28 RX ADMIN — URSODIOL 250 MILLIGRAM(S): 300 CAPSULE ORAL at 20:05

## 2024-06-29 DIAGNOSIS — A41.9 SEPSIS, UNSPECIFIED ORGANISM: ICD-10-CM

## 2024-06-29 LAB
A1C WITH ESTIMATED AVERAGE GLUCOSE RESULT: 7.4 % — HIGH (ref 4–5.6)
ALBUMIN SERPL ELPH-MCNC: 2.1 G/DL — LOW (ref 3.3–5)
ALBUMIN SERPL ELPH-MCNC: 2.2 G/DL — LOW (ref 3.3–5)
ALP SERPL-CCNC: 200 U/L — HIGH (ref 40–120)
ALP SERPL-CCNC: 203 U/L — HIGH (ref 40–120)
ALT FLD-CCNC: 20 U/L — SIGNIFICANT CHANGE UP (ref 4–41)
ALT FLD-CCNC: SIGNIFICANT CHANGE UP U/L (ref 4–41)
ANION GAP SERPL CALC-SCNC: 11 MMOL/L — SIGNIFICANT CHANGE UP (ref 7–14)
ANION GAP SERPL CALC-SCNC: 7 MMOL/L — SIGNIFICANT CHANGE UP (ref 7–14)
AST SERPL-CCNC: 41 U/L — HIGH (ref 4–40)
AST SERPL-CCNC: SIGNIFICANT CHANGE UP U/L (ref 4–40)
BILIRUB SERPL-MCNC: 2.6 MG/DL — HIGH (ref 0.2–1.2)
BILIRUB SERPL-MCNC: 2.6 MG/DL — HIGH (ref 0.2–1.2)
BUN SERPL-MCNC: 12 MG/DL — SIGNIFICANT CHANGE UP (ref 7–23)
BUN SERPL-MCNC: 12 MG/DL — SIGNIFICANT CHANGE UP (ref 7–23)
CALCIUM SERPL-MCNC: 6.9 MG/DL — LOW (ref 8.4–10.5)
CALCIUM SERPL-MCNC: 7.3 MG/DL — LOW (ref 8.4–10.5)
CHLORIDE SERPL-SCNC: 105 MMOL/L — SIGNIFICANT CHANGE UP (ref 98–107)
CHLORIDE SERPL-SCNC: 106 MMOL/L — SIGNIFICANT CHANGE UP (ref 98–107)
CO2 SERPL-SCNC: 16 MMOL/L — LOW (ref 22–31)
CO2 SERPL-SCNC: 17 MMOL/L — LOW (ref 22–31)
CREAT SERPL-MCNC: 1.03 MG/DL — SIGNIFICANT CHANGE UP (ref 0.5–1.3)
CREAT SERPL-MCNC: 1.1 MG/DL — SIGNIFICANT CHANGE UP (ref 0.5–1.3)
EGFR: 75 ML/MIN/1.73M2 — SIGNIFICANT CHANGE UP
EGFR: 81 ML/MIN/1.73M2 — SIGNIFICANT CHANGE UP
ESTIMATED AVERAGE GLUCOSE: 166 — SIGNIFICANT CHANGE UP
GLUCOSE BLDC GLUCOMTR-MCNC: 161 MG/DL — HIGH (ref 70–99)
GLUCOSE BLDC GLUCOMTR-MCNC: 182 MG/DL — HIGH (ref 70–99)
GLUCOSE BLDC GLUCOMTR-MCNC: 183 MG/DL — HIGH (ref 70–99)
GLUCOSE BLDC GLUCOMTR-MCNC: 207 MG/DL — HIGH (ref 70–99)
GLUCOSE SERPL-MCNC: 146 MG/DL — HIGH (ref 70–99)
GLUCOSE SERPL-MCNC: 165 MG/DL — HIGH (ref 70–99)
HCT VFR BLD CALC: 28.8 % — LOW (ref 39–50)
HGB BLD-MCNC: 9.6 G/DL — LOW (ref 13–17)
LACTATE SERPL-SCNC: 1.2 MMOL/L — SIGNIFICANT CHANGE UP (ref 0.5–2)
LDH SERPL L TO P-CCNC: SIGNIFICANT CHANGE UP U/L (ref 135–225)
MAGNESIUM SERPL-MCNC: 1.3 MG/DL — LOW (ref 1.6–2.6)
MCHC RBC-ENTMCNC: 32.2 PG — SIGNIFICANT CHANGE UP (ref 27–34)
MCHC RBC-ENTMCNC: 33.3 GM/DL — SIGNIFICANT CHANGE UP (ref 32–36)
MCV RBC AUTO: 96.6 FL — SIGNIFICANT CHANGE UP (ref 80–100)
NRBC # BLD: 0 /100 WBCS — SIGNIFICANT CHANGE UP (ref 0–0)
NRBC # FLD: 0 K/UL — SIGNIFICANT CHANGE UP (ref 0–0)
PHOSPHATE SERPL-MCNC: 2.2 MG/DL — LOW (ref 2.5–4.5)
PLATELET # BLD AUTO: 165 K/UL — SIGNIFICANT CHANGE UP (ref 150–400)
POTASSIUM SERPL-MCNC: 4.1 MMOL/L — SIGNIFICANT CHANGE UP (ref 3.5–5.3)
POTASSIUM SERPL-MCNC: SIGNIFICANT CHANGE UP MMOL/L (ref 3.5–5.3)
POTASSIUM SERPL-SCNC: 4.1 MMOL/L — SIGNIFICANT CHANGE UP (ref 3.5–5.3)
POTASSIUM SERPL-SCNC: SIGNIFICANT CHANGE UP MMOL/L (ref 3.5–5.3)
PROT SERPL-MCNC: 6.4 G/DL — SIGNIFICANT CHANGE UP (ref 6–8.3)
PROT SERPL-MCNC: SIGNIFICANT CHANGE UP G/DL (ref 6–8.3)
RBC # BLD: 2.98 M/UL — LOW (ref 4.2–5.8)
RBC # FLD: 16.5 % — HIGH (ref 10.3–14.5)
SODIUM SERPL-SCNC: 128 MMOL/L — LOW (ref 135–145)
SODIUM SERPL-SCNC: 134 MMOL/L — LOW (ref 135–145)
VANCOMYCIN TROUGH SERPL-MCNC: 10.6 UG/ML — SIGNIFICANT CHANGE UP (ref 10–20)
VANCOMYCIN TROUGH SERPL-MCNC: 13 UG/ML — SIGNIFICANT CHANGE UP (ref 10–20)
WBC # BLD: 6.1 K/UL — SIGNIFICANT CHANGE UP (ref 3.8–10.5)
WBC # FLD AUTO: 6.1 K/UL — SIGNIFICANT CHANGE UP (ref 3.8–10.5)

## 2024-06-29 PROCEDURE — 99233 SBSQ HOSP IP/OBS HIGH 50: CPT

## 2024-06-29 RX ORDER — ONDANSETRON HYDROCHLORIDE 2 MG/ML
4 INJECTION INTRAMUSCULAR; INTRAVENOUS EVERY 8 HOURS
Refills: 0 | Status: COMPLETED | OUTPATIENT
Start: 2024-06-29 | End: 2024-06-30

## 2024-06-29 RX ORDER — PANTOPRAZOLE SODIUM 40 MG/10ML
40 INJECTION, POWDER, FOR SOLUTION INTRAVENOUS
Refills: 0 | Status: DISCONTINUED | OUTPATIENT
Start: 2024-06-29 | End: 2024-07-01

## 2024-06-29 RX ORDER — MAGNESIUM, ALUMINUM HYDROXIDE 400-400
30 TABLET,CHEWABLE ORAL EVERY 4 HOURS
Refills: 0 | Status: DISCONTINUED | OUTPATIENT
Start: 2024-06-29 | End: 2024-07-01

## 2024-06-29 RX ORDER — MAGNESIUM SULFATE 100 %
2 POWDER (GRAM) MISCELLANEOUS ONCE
Refills: 0 | Status: COMPLETED | OUTPATIENT
Start: 2024-06-29 | End: 2024-06-29

## 2024-06-29 RX ORDER — DEXTROSE MONOHYDRATE AND SODIUM CHLORIDE 5; .3 G/100ML; G/100ML
1000 INJECTION, SOLUTION INTRAVENOUS
Refills: 0 | Status: DISCONTINUED | OUTPATIENT
Start: 2024-06-29 | End: 2024-07-01

## 2024-06-29 RX ADMIN — VANCOMYCIN HYDROCHLORIDE 250 MILLIGRAM(S): KIT at 09:47

## 2024-06-29 RX ADMIN — URSODIOL 250 MILLIGRAM(S): 300 CAPSULE ORAL at 18:31

## 2024-06-29 RX ADMIN — INSULIN LISPRO 1: 100 INJECTION, SOLUTION SUBCUTANEOUS at 18:31

## 2024-06-29 RX ADMIN — HYDROXYZINE PAMOATE 25 MILLIGRAM(S): 50 CAPSULE ORAL at 21:37

## 2024-06-29 RX ADMIN — HYDROXYZINE PAMOATE 25 MILLIGRAM(S): 50 CAPSULE ORAL at 06:54

## 2024-06-29 RX ADMIN — DEXTROSE MONOHYDRATE AND SODIUM CHLORIDE 75 MILLILITER(S): 5; .3 INJECTION, SOLUTION INTRAVENOUS at 11:44

## 2024-06-29 RX ADMIN — Medication 80 MILLILITER(S): at 01:31

## 2024-06-29 RX ADMIN — PIPERACILLIN SODIUM AND TAZOBACTAM SODIUM 25 GRAM(S): 3; .375 INJECTION, POWDER, LYOPHILIZED, FOR SOLUTION INTRAVENOUS at 13:46

## 2024-06-29 RX ADMIN — Medication 30 MILLILITER(S): at 22:37

## 2024-06-29 RX ADMIN — PIPERACILLIN SODIUM AND TAZOBACTAM SODIUM 25 GRAM(S): 3; .375 INJECTION, POWDER, LYOPHILIZED, FOR SOLUTION INTRAVENOUS at 06:53

## 2024-06-29 RX ADMIN — Medication 63.75 MILLIMOLE(S): at 11:44

## 2024-06-29 RX ADMIN — Medication 25 GRAM(S): at 11:44

## 2024-06-29 RX ADMIN — PIPERACILLIN SODIUM AND TAZOBACTAM SODIUM 25 GRAM(S): 3; .375 INJECTION, POWDER, LYOPHILIZED, FOR SOLUTION INTRAVENOUS at 21:36

## 2024-06-29 RX ADMIN — URSODIOL 250 MILLIGRAM(S): 300 CAPSULE ORAL at 07:44

## 2024-06-29 RX ADMIN — INSULIN LISPRO 1: 100 INJECTION, SOLUTION SUBCUTANEOUS at 13:06

## 2024-06-29 RX ADMIN — ENOXAPARIN SODIUM 40 MILLIGRAM(S): 100 INJECTION SUBCUTANEOUS at 18:32

## 2024-06-29 RX ADMIN — VANCOMYCIN HYDROCHLORIDE 250 MILLIGRAM(S): KIT at 22:58

## 2024-06-29 RX ADMIN — INSULIN LISPRO 1: 100 INJECTION, SOLUTION SUBCUTANEOUS at 08:56

## 2024-06-29 RX ADMIN — Medication 40 MILLIGRAM(S): at 13:46

## 2024-06-30 LAB
ALBUMIN SERPL ELPH-MCNC: 2.3 G/DL — LOW (ref 3.3–5)
ALP SERPL-CCNC: 178 U/L — HIGH (ref 40–120)
ALT FLD-CCNC: 19 U/L — SIGNIFICANT CHANGE UP (ref 4–41)
ANION GAP SERPL CALC-SCNC: 10 MMOL/L — SIGNIFICANT CHANGE UP (ref 7–14)
AST SERPL-CCNC: 28 U/L — SIGNIFICANT CHANGE UP (ref 4–40)
BILIRUB SERPL-MCNC: 2.4 MG/DL — HIGH (ref 0.2–1.2)
BUN SERPL-MCNC: 8 MG/DL — SIGNIFICANT CHANGE UP (ref 7–23)
CALCIUM SERPL-MCNC: 7.7 MG/DL — LOW (ref 8.4–10.5)
CHLORIDE SERPL-SCNC: 105 MMOL/L — SIGNIFICANT CHANGE UP (ref 98–107)
CO2 SERPL-SCNC: 22 MMOL/L — SIGNIFICANT CHANGE UP (ref 22–31)
CREAT SERPL-MCNC: 0.96 MG/DL — SIGNIFICANT CHANGE UP (ref 0.5–1.3)
EGFR: 88 ML/MIN/1.73M2 — SIGNIFICANT CHANGE UP
GLUCOSE BLDC GLUCOMTR-MCNC: 147 MG/DL — HIGH (ref 70–99)
GLUCOSE BLDC GLUCOMTR-MCNC: 150 MG/DL — HIGH (ref 70–99)
GLUCOSE BLDC GLUCOMTR-MCNC: 237 MG/DL — HIGH (ref 70–99)
GLUCOSE BLDC GLUCOMTR-MCNC: 244 MG/DL — HIGH (ref 70–99)
GLUCOSE SERPL-MCNC: 165 MG/DL — HIGH (ref 70–99)
HCT VFR BLD CALC: 26.9 % — LOW (ref 39–50)
HGB BLD-MCNC: 8.8 G/DL — LOW (ref 13–17)
LDH SERPL L TO P-CCNC: 192 U/L — SIGNIFICANT CHANGE UP (ref 135–225)
MAGNESIUM SERPL-MCNC: 1.8 MG/DL — SIGNIFICANT CHANGE UP (ref 1.6–2.6)
MCHC RBC-ENTMCNC: 32.1 PG — SIGNIFICANT CHANGE UP (ref 27–34)
MCHC RBC-ENTMCNC: 32.7 GM/DL — SIGNIFICANT CHANGE UP (ref 32–36)
MCV RBC AUTO: 98.2 FL — SIGNIFICANT CHANGE UP (ref 80–100)
NRBC # BLD: 0 /100 WBCS — SIGNIFICANT CHANGE UP (ref 0–0)
NRBC # FLD: 0 K/UL — SIGNIFICANT CHANGE UP (ref 0–0)
PHOSPHATE SERPL-MCNC: 2.4 MG/DL — LOW (ref 2.5–4.5)
PLATELET # BLD AUTO: 135 K/UL — LOW (ref 150–400)
POTASSIUM SERPL-MCNC: 3.9 MMOL/L — SIGNIFICANT CHANGE UP (ref 3.5–5.3)
POTASSIUM SERPL-SCNC: 3.9 MMOL/L — SIGNIFICANT CHANGE UP (ref 3.5–5.3)
PROT SERPL-MCNC: 6.4 G/DL — SIGNIFICANT CHANGE UP (ref 6–8.3)
RBC # BLD: 2.74 M/UL — LOW (ref 4.2–5.8)
RBC # FLD: 14.5 % — SIGNIFICANT CHANGE UP (ref 10.3–14.5)
SODIUM SERPL-SCNC: 137 MMOL/L — SIGNIFICANT CHANGE UP (ref 135–145)
WBC # BLD: 4.33 K/UL — SIGNIFICANT CHANGE UP (ref 3.8–10.5)
WBC # FLD AUTO: 4.33 K/UL — SIGNIFICANT CHANGE UP (ref 3.8–10.5)

## 2024-06-30 PROCEDURE — 99232 SBSQ HOSP IP/OBS MODERATE 35: CPT

## 2024-06-30 RX ORDER — POTASSIUM PHOSPHATE, MONOBASIC POTASSIUM PHOSPHATE, DIBASIC INJECTION, 236; 224 MG/ML; MG/ML
15 SOLUTION, CONCENTRATE INTRAVENOUS ONCE
Refills: 0 | Status: COMPLETED | OUTPATIENT
Start: 2024-06-30 | End: 2024-06-30

## 2024-06-30 RX ADMIN — Medication 40 MILLIGRAM(S): at 11:35

## 2024-06-30 RX ADMIN — PANTOPRAZOLE SODIUM 40 MILLIGRAM(S): 40 INJECTION, POWDER, FOR SOLUTION INTRAVENOUS at 05:44

## 2024-06-30 RX ADMIN — URSODIOL 250 MILLIGRAM(S): 300 CAPSULE ORAL at 17:34

## 2024-06-30 RX ADMIN — HYDROXYZINE PAMOATE 25 MILLIGRAM(S): 50 CAPSULE ORAL at 14:57

## 2024-06-30 RX ADMIN — ENOXAPARIN SODIUM 40 MILLIGRAM(S): 100 INJECTION SUBCUTANEOUS at 19:27

## 2024-06-30 RX ADMIN — POTASSIUM PHOSPHATE, MONOBASIC POTASSIUM PHOSPHATE, DIBASIC INJECTION, 62.5 MILLIMOLE(S): 236; 224 SOLUTION, CONCENTRATE INTRAVENOUS at 14:58

## 2024-06-30 RX ADMIN — PIPERACILLIN SODIUM AND TAZOBACTAM SODIUM 25 GRAM(S): 3; .375 INJECTION, POWDER, LYOPHILIZED, FOR SOLUTION INTRAVENOUS at 14:57

## 2024-06-30 RX ADMIN — INSULIN LISPRO 2: 100 INJECTION, SOLUTION SUBCUTANEOUS at 13:18

## 2024-06-30 RX ADMIN — PIPERACILLIN SODIUM AND TAZOBACTAM SODIUM 25 GRAM(S): 3; .375 INJECTION, POWDER, LYOPHILIZED, FOR SOLUTION INTRAVENOUS at 05:44

## 2024-06-30 RX ADMIN — HYDROXYZINE PAMOATE 25 MILLIGRAM(S): 50 CAPSULE ORAL at 21:24

## 2024-06-30 RX ADMIN — HYDROXYZINE PAMOATE 25 MILLIGRAM(S): 50 CAPSULE ORAL at 05:44

## 2024-06-30 RX ADMIN — URSODIOL 250 MILLIGRAM(S): 300 CAPSULE ORAL at 05:44

## 2024-06-30 RX ADMIN — VANCOMYCIN HYDROCHLORIDE 250 MILLIGRAM(S): KIT at 10:57

## 2024-07-01 ENCOUNTER — TRANSCRIPTION ENCOUNTER (OUTPATIENT)
Age: 64
End: 2024-07-01

## 2024-07-01 VITALS
RESPIRATION RATE: 17 BRPM | TEMPERATURE: 98 F | HEART RATE: 73 BPM | SYSTOLIC BLOOD PRESSURE: 148 MMHG | OXYGEN SATURATION: 100 % | DIASTOLIC BLOOD PRESSURE: 83 MMHG

## 2024-07-01 LAB
-  AMOXICILLIN/CLAVULANIC ACID: SIGNIFICANT CHANGE UP
-  AMPICILLIN/SULBACTAM: SIGNIFICANT CHANGE UP
-  AMPICILLIN: SIGNIFICANT CHANGE UP
-  AZTREONAM: SIGNIFICANT CHANGE UP
-  CEFAZOLIN: SIGNIFICANT CHANGE UP
-  CEFEPIME: SIGNIFICANT CHANGE UP
-  CEFOXITIN: SIGNIFICANT CHANGE UP
-  CEFTRIAXONE: SIGNIFICANT CHANGE UP
-  CEFUROXIME: SIGNIFICANT CHANGE UP
-  CIPROFLOXACIN: SIGNIFICANT CHANGE UP
-  ERTAPENEM: SIGNIFICANT CHANGE UP
-  GENTAMICIN: SIGNIFICANT CHANGE UP
-  IMIPENEM: SIGNIFICANT CHANGE UP
-  LEVOFLOXACIN: SIGNIFICANT CHANGE UP
-  MEROPENEM: SIGNIFICANT CHANGE UP
-  NITROFURANTOIN: SIGNIFICANT CHANGE UP
-  PIPERACILLIN/TAZOBACTAM: SIGNIFICANT CHANGE UP
-  TOBRAMYCIN: SIGNIFICANT CHANGE UP
-  TRIMETHOPRIM/SULFAMETHOXAZOLE: SIGNIFICANT CHANGE UP
ALBUMIN SERPL ELPH-MCNC: 2.4 G/DL — LOW (ref 3.3–5)
ALP SERPL-CCNC: 172 U/L — HIGH (ref 40–120)
ALT FLD-CCNC: 15 U/L — SIGNIFICANT CHANGE UP (ref 4–41)
AMMONIA BLD-MCNC: 33 UMOL/L — SIGNIFICANT CHANGE UP (ref 11–55)
ANION GAP SERPL CALC-SCNC: 11 MMOL/L — SIGNIFICANT CHANGE UP (ref 7–14)
AST SERPL-CCNC: 47 U/L — HIGH (ref 4–40)
BILIRUB SERPL-MCNC: 2.4 MG/DL — HIGH (ref 0.2–1.2)
BUN SERPL-MCNC: 4 MG/DL — LOW (ref 7–23)
CALCIUM SERPL-MCNC: 8 MG/DL — LOW (ref 8.4–10.5)
CHLORIDE SERPL-SCNC: 107 MMOL/L — SIGNIFICANT CHANGE UP (ref 98–107)
CO2 SERPL-SCNC: 18 MMOL/L — LOW (ref 22–31)
CREAT SERPL-MCNC: 0.88 MG/DL — SIGNIFICANT CHANGE UP (ref 0.5–1.3)
CULTURE RESULTS: ABNORMAL
EGFR: 96 ML/MIN/1.73M2 — SIGNIFICANT CHANGE UP
GLUCOSE BLDC GLUCOMTR-MCNC: 146 MG/DL — HIGH (ref 70–99)
GLUCOSE BLDC GLUCOMTR-MCNC: 267 MG/DL — HIGH (ref 70–99)
GLUCOSE SERPL-MCNC: 154 MG/DL — HIGH (ref 70–99)
HCT VFR BLD CALC: 30.2 % — LOW (ref 39–50)
HGB BLD-MCNC: 9.8 G/DL — LOW (ref 13–17)
LDH SERPL L TO P-CCNC: 355 U/L — HIGH (ref 135–225)
MAGNESIUM SERPL-MCNC: 1.8 MG/DL — SIGNIFICANT CHANGE UP (ref 1.6–2.6)
MCHC RBC-ENTMCNC: 31.9 PG — SIGNIFICANT CHANGE UP (ref 27–34)
MCHC RBC-ENTMCNC: 32.5 GM/DL — SIGNIFICANT CHANGE UP (ref 32–36)
MCV RBC AUTO: 98.4 FL — SIGNIFICANT CHANGE UP (ref 80–100)
METHOD TYPE: SIGNIFICANT CHANGE UP
NRBC # BLD: 0 /100 WBCS — SIGNIFICANT CHANGE UP (ref 0–0)
NRBC # FLD: 0 K/UL — SIGNIFICANT CHANGE UP (ref 0–0)
ORGANISM # SPEC MICROSCOPIC CNT: ABNORMAL
ORGANISM # SPEC MICROSCOPIC CNT: ABNORMAL
PHOSPHATE SERPL-MCNC: 3.3 MG/DL — SIGNIFICANT CHANGE UP (ref 2.5–4.5)
PLATELET # BLD AUTO: 148 K/UL — LOW (ref 150–400)
POTASSIUM SERPL-MCNC: 4.4 MMOL/L — SIGNIFICANT CHANGE UP (ref 3.5–5.3)
POTASSIUM SERPL-SCNC: 4.4 MMOL/L — SIGNIFICANT CHANGE UP (ref 3.5–5.3)
PROT SERPL-MCNC: 6.6 G/DL — SIGNIFICANT CHANGE UP (ref 6–8.3)
RBC # BLD: 3.07 M/UL — LOW (ref 4.2–5.8)
RBC # FLD: 14 % — SIGNIFICANT CHANGE UP (ref 10.3–14.5)
SODIUM SERPL-SCNC: 136 MMOL/L — SIGNIFICANT CHANGE UP (ref 135–145)
SPECIMEN SOURCE: SIGNIFICANT CHANGE UP
WBC # BLD: 4.73 K/UL — SIGNIFICANT CHANGE UP (ref 3.8–10.5)
WBC # FLD AUTO: 4.73 K/UL — SIGNIFICANT CHANGE UP (ref 3.8–10.5)

## 2024-07-01 PROCEDURE — 99239 HOSP IP/OBS DSCHRG MGMT >30: CPT

## 2024-07-01 RX ORDER — HYDROXYZINE PAMOATE 50 MG/1
1 CAPSULE ORAL
Qty: 0 | Refills: 0 | DISCHARGE

## 2024-07-01 RX ORDER — AMOXICILLIN/POTASSIUM CLAV 250-125 MG
1 TABLET ORAL
Qty: 10 | Refills: 0
Start: 2024-07-01 | End: 2024-07-05

## 2024-07-01 RX ORDER — PANTOPRAZOLE SODIUM 40 MG/10ML
1 INJECTION, POWDER, FOR SOLUTION INTRAVENOUS
Qty: 30 | Refills: 0
Start: 2024-07-01 | End: 2024-07-30

## 2024-07-01 RX ORDER — INSULIN GLARGINE 100 [IU]/ML
5 INJECTION, SOLUTION SUBCUTANEOUS
Qty: 0 | Refills: 0 | DISCHARGE

## 2024-07-01 RX ADMIN — HYDROXYZINE PAMOATE 25 MILLIGRAM(S): 50 CAPSULE ORAL at 05:46

## 2024-07-01 RX ADMIN — PIPERACILLIN SODIUM AND TAZOBACTAM SODIUM 25 GRAM(S): 3; .375 INJECTION, POWDER, LYOPHILIZED, FOR SOLUTION INTRAVENOUS at 02:34

## 2024-07-01 RX ADMIN — INSULIN LISPRO 3: 100 INJECTION, SOLUTION SUBCUTANEOUS at 12:59

## 2024-07-01 RX ADMIN — PIPERACILLIN SODIUM AND TAZOBACTAM SODIUM 25 GRAM(S): 3; .375 INJECTION, POWDER, LYOPHILIZED, FOR SOLUTION INTRAVENOUS at 10:46

## 2024-07-01 RX ADMIN — HYDROXYZINE PAMOATE 25 MILLIGRAM(S): 50 CAPSULE ORAL at 13:45

## 2024-07-01 RX ADMIN — URSODIOL 250 MILLIGRAM(S): 300 CAPSULE ORAL at 05:46

## 2024-07-01 RX ADMIN — Medication 40 MILLIGRAM(S): at 12:58

## 2024-07-01 RX ADMIN — PANTOPRAZOLE SODIUM 40 MILLIGRAM(S): 40 INJECTION, POWDER, FOR SOLUTION INTRAVENOUS at 05:46

## 2024-07-03 LAB
CULTURE RESULTS: SIGNIFICANT CHANGE UP
CULTURE RESULTS: SIGNIFICANT CHANGE UP
SPECIMEN SOURCE: SIGNIFICANT CHANGE UP
SPECIMEN SOURCE: SIGNIFICANT CHANGE UP

## 2024-07-08 ENCOUNTER — APPOINTMENT (OUTPATIENT)
Dept: ENDOCRINOLOGY | Facility: CLINIC | Age: 64
End: 2024-07-08

## 2024-07-28 ENCOUNTER — TRANSCRIPTION ENCOUNTER (OUTPATIENT)
Age: 64
End: 2024-07-28

## 2024-10-22 NOTE — PROGRESS NOTE ADULT - PROBLEM SELECTOR PLAN 2
CTAP with IV contrast demonstrating a 3.5 x 4.5 x 3.0 cm heterogenous solid mass in the pancreatic head/uncinate process with upstream main pancreatic ductal dilatation as well as extrahepatic and intrahepatic biliary ductal dilatation, including CBD dilation to 2.2 cm. Suspicious for pancreatic malignancy.  Elevated CA 19-9 and CEA.     - surg onc consulted -> f/u biopsy results   - Plan as above for cholestatic jaundice  - will need outpatient onc eval current weight

## 2024-12-03 ENCOUNTER — APPOINTMENT (OUTPATIENT)
Dept: ENDOCRINOLOGY | Facility: CLINIC | Age: 64
End: 2024-12-03

## 2025-01-06 NOTE — PRE-OP CHECKLIST - HEIGHT IN CM
Name of Medication(s) Requested:  Requested Prescriptions     Pending Prescriptions Disp Refills    cetirizine (ZYRTEC) 10 MG tablet [Pharmacy Med Name: Cetirizine HCl 10 MG Oral Tablet] 30 tablet 0     Sig: Take 1 tablet by mouth once daily       Medication is on current medication list Yes    Dosage and directions were verified? Yes    Quantity verified: 30 day supply     Pharmacy Verified?  Yes    Last Appointment:  12/13/2024    Future appts:  Future Appointments   Date Time Provider Department Center   2/10/2025  3:20 PM Arnol Cummins APRN - CNP [facility] NEURO Neurology -        (If no appt send self scheduling link. .REFILLAPPT)  Scheduling request sent?     [] Yes  [x] No    Does patient need updated?  [] Yes  [x] No  
177.8
177.8

## 2025-04-21 NOTE — PROGRESS NOTE ADULT - PROBLEM SELECTOR PROBLEM 9
[Subsequent Evaluation] : a subsequent evaluation for [FreeTextEntry2] : after rhinaer procedure Asthma

## 2025-07-29 NOTE — H&P ADULT - PROBLEM/PLAN-10
Addended by: BROCK WONG on: 7/29/2025 01:10 PM     Modules accepted: Orders    
DISPLAY PLAN FREE TEXT

## (undated) DEVICE — SALIVA EJECTOR (BLUE)

## (undated) DEVICE — DRSG BANDAID 0.75X3"

## (undated) DEVICE — DEVICE GRASPING RAPTOR

## (undated) DEVICE — DRSG 2X2

## (undated) DEVICE — SOL INJ NS 0.9% 500ML 1-PORT

## (undated) DEVICE — DRSG CURITY GAUZE SPONGE 4 X 4" 12-PLY NON-STERILE

## (undated) DEVICE — INFINITY SAMPLING DEVICE ERCP 9FRX200CM

## (undated) DEVICE — SYR LUER LOK 10CC

## (undated) DEVICE — ORGANIZER MIO MEDICAL DEVICE DISP

## (undated) DEVICE — DVC AUTO CAP RX LOKG

## (undated) DEVICE — ELCTR ECG CONDUCTIVE ADHESIVE

## (undated) DEVICE — LUBRICATING JELLY HR ONE SHOT 3G

## (undated) DEVICE — TUBING SUCTION NONCONDUCTIVE 6MM X 12FT

## (undated) DEVICE — BASIN EMESIS 10IN GRADUATED MAUVE

## (undated) DEVICE — PACK IV START WITH CHG

## (undated) DEVICE — DENTURE CUP PINK

## (undated) DEVICE — BIOPSY FORCEP COLD DISP

## (undated) DEVICE — CONTAINER FORMALIN 80ML YELLOW

## (undated) DEVICE — BITE BLOCK ADULT 20 X 27MM (GREEN)

## (undated) DEVICE — BIOPSY FORCEP RADIAL JAW 4 STANDARD WITH NEEDLE

## (undated) DEVICE — OMNIPAQUE 300  30ML

## (undated) DEVICE — SOL IRR POUR NS 0.9% 500ML

## (undated) DEVICE — CATH IV SAFE BC 22G X 1" (BLUE)

## (undated) DEVICE — NDL HYPO SAFE 22G X 1" (BLACK)

## (undated) DEVICE — SYR LUER LOK 3CC

## (undated) DEVICE — OLYMPUS DISTAL COVER ENDOSCOPE

## (undated) DEVICE — CLAMP BX HOT RAD JAW 3

## (undated) DEVICE — GOWN LG

## (undated) DEVICE — SYS BIOPSY NDL FILE SS STRL 22G

## (undated) DEVICE — TUBING MEDI-VAC W MAXIGRIP CONNECTORS 1/4"X6'

## (undated) DEVICE — UNDERPAD LINEN SAVER 17 X 24"

## (undated) DEVICE — INJ SYS RAP REFIL

## (undated) DEVICE — TUBING IV SET GRAVITY 3Y 100" MACRO